# Patient Record
Sex: FEMALE | Race: WHITE | NOT HISPANIC OR LATINO | Employment: OTHER | URBAN - METROPOLITAN AREA
[De-identification: names, ages, dates, MRNs, and addresses within clinical notes are randomized per-mention and may not be internally consistent; named-entity substitution may affect disease eponyms.]

---

## 2017-01-05 ENCOUNTER — RADIANT APPOINTMENT (OUTPATIENT)
Dept: GENERAL RADIOLOGY | Facility: CLINIC | Age: 72
End: 2017-01-05
Attending: FAMILY MEDICINE
Payer: COMMERCIAL

## 2017-01-05 DIAGNOSIS — R52 PAIN: ICD-10-CM

## 2017-01-05 DIAGNOSIS — M54.2 NECK PAIN: ICD-10-CM

## 2017-01-05 DIAGNOSIS — M25.511 RIGHT SHOULDER PAIN, UNSPECIFIED CHRONICITY: ICD-10-CM

## 2017-01-05 PROCEDURE — 72040 X-RAY EXAM NECK SPINE 2-3 VW: CPT

## 2017-01-05 PROCEDURE — 73030 X-RAY EXAM OF SHOULDER: CPT | Mod: RT

## 2017-01-06 ENCOUNTER — HOSPITAL ENCOUNTER (OUTPATIENT)
Dept: PHYSICAL THERAPY | Facility: CLINIC | Age: 72
Setting detail: THERAPIES SERIES
End: 2017-01-06
Attending: FAMILY MEDICINE
Payer: MEDICARE

## 2017-01-06 ENCOUNTER — TELEPHONE (OUTPATIENT)
Dept: FAMILY MEDICINE | Facility: CLINIC | Age: 72
End: 2017-01-06

## 2017-01-06 PROCEDURE — 40000718 ZZHC STATISTIC PT DEPARTMENT ORTHO VISIT: Performed by: PHYSICAL THERAPIST

## 2017-01-06 PROCEDURE — G8985 CARRY GOAL STATUS: HCPCS | Mod: GP,CI | Performed by: PHYSICAL THERAPIST

## 2017-01-06 PROCEDURE — 97140 MANUAL THERAPY 1/> REGIONS: CPT | Mod: GP | Performed by: PHYSICAL THERAPIST

## 2017-01-06 PROCEDURE — G8984 CARRY CURRENT STATUS: HCPCS | Mod: GP,CJ | Performed by: PHYSICAL THERAPIST

## 2017-01-06 PROCEDURE — 97110 THERAPEUTIC EXERCISES: CPT | Mod: GP | Performed by: PHYSICAL THERAPIST

## 2017-01-06 PROCEDURE — G8986 CARRY D/C STATUS: HCPCS | Mod: GP,CJ | Performed by: PHYSICAL THERAPIST

## 2017-01-06 NOTE — PROGRESS NOTES
Outpatient Physical Therapy Progress Note     Patient: Ruba Farmer  : 1945    Beginning/End Dates of Reporting Period:  16 to 2017    Referring Provider: Xiao Dasilva MD    Therapy Diagnosis: Right GH primary impingement     Client Self Report: Recieved Xrays yesterday and the pain has not improved.  Worried that tendons have calcified further.      Objective Measurements:  Objective Measure: Time able to sleep without waking due to neck pain  Details: 4 hours    Objective Measure: Cervical AROM  Details: Rt rot=75 deg; Lt rot=70 deg    Objective Measure: Radiograph results cervical spine  Details: 17 XR cervical spine IMPRESSION: Four views of the cervical spine show no acute finding.  Multilevel degenerative disc disease is present from C4 to C7.    Objective Measure: Radiograph results right shoulder   Details: 17 XR cervical spine IMPRESSION: 3 views of the right shoulder show no acute finding. Calcific tendinitis of the rotator cuff is again suggested and more prominent compared to the prior study.         Goals:  Goal Identifier     Goal Description Following PT interventions, pt will be able to drive for >=2 hours without low back pain to allow for normalized community mobility.   Target Date 16   Date Met  16   Progress:     Goal Identifier     Goal Description Following PT interventions, pt will have 4+/5 left glut med strength to provide proper lumbopelvic stability with weightbearing activities.   Target Date 16   Date Met  16   Progress:     Goal Identifier     Goal Description Following PT interventions, pt will be independent with her HEP to prevent future occurance of pain and difficulty.   Target Date 16   Date Met  16   Progress:     Goal Identifier     Goal Description Following PT interventions, pt will have >=4+/5 right GH ER strength to decrease impingement with overhead activities.   Target Date 16   Date Met   09/02/16   Progress:     Goal Identifier     Goal Description Following PT interventions, pt will be able to sleep on right side for >=7 hours without waking due to pain to allow for restorative sleep pattern.   Target Date 01/20/17   Date Met      Progress:         Progress Toward Goals:   Progress this reporting period: Marcy has made good gains with global shoulder strength and mobility.  Minimal progress with pain relief.  Radiographs reveal calcific tendonitis likely contributing to impingement pain.            Plan:  Changes to therapy plan of care: No formal PT visits at this time.  Chart to remain open for 8 weeks in case of exacerbation.    Discharge:  No      RECERTIFICATION    Ruba Farmer  1945    Session Number: 5/365  since start of care.    Reasons for Continuing Treatment:   Chart to remain open in case of exacerbation; patient is independently working on HEP    Frequency/Duration  1 times per month for 2 months for a total of 2 visits.    Recertification Period  12/31/16 - 3/3/17    Physician Signature:    Date:    X_______________________________________________________    Physician Name: Dr. Xiao Dasilva MD    I certify the need for these services furnished under this plan of treatment and while under my care. Physician co-signature of this document indicates review and certification of the therapy plan.  This signature may be written on paper, or electronically signed within [employer].

## 2017-01-06 NOTE — TELEPHONE ENCOUNTER
Patient will be notified when the xray has been read.  Will close this encounter.    Krista BHAGAT RN

## 2017-01-06 NOTE — TELEPHONE ENCOUNTER
Marcy has been seeing Myrna in PT and she recommended she have an xray. Pt had shoulder xray yesterday and is wanting to talk about the results. She is wondering if this is something that she needs to see ortho for. I did tell her the radiologist has not read this yet.  Please let her know findings when available.

## 2017-01-09 ENCOUNTER — MYC MEDICAL ADVICE (OUTPATIENT)
Dept: FAMILY MEDICINE | Facility: CLINIC | Age: 72
End: 2017-01-09

## 2017-01-09 DIAGNOSIS — M25.511 CHRONIC RIGHT SHOULDER PAIN: Primary | ICD-10-CM

## 2017-01-09 DIAGNOSIS — G89.29 CHRONIC RIGHT SHOULDER PAIN: Primary | ICD-10-CM

## 2017-01-18 ENCOUNTER — TRANSFERRED RECORDS (OUTPATIENT)
Dept: HEALTH INFORMATION MANAGEMENT | Facility: CLINIC | Age: 72
End: 2017-01-18

## 2017-01-30 ENCOUNTER — TRANSFERRED RECORDS (OUTPATIENT)
Dept: HEALTH INFORMATION MANAGEMENT | Facility: CLINIC | Age: 72
End: 2017-01-30

## 2017-02-03 DIAGNOSIS — J45.21 INTERMITTENT ASTHMA, WITH ACUTE EXACERBATION: Primary | ICD-10-CM

## 2017-02-03 RX ORDER — DOXYCYCLINE HYCLATE 100 MG
100 TABLET ORAL 2 TIMES DAILY
Qty: 14 TABLET | Refills: 0 | Status: SHIPPED | OUTPATIENT
Start: 2017-02-03 | End: 2017-02-28

## 2017-02-03 NOTE — TELEPHONE ENCOUNTER
Doxycycline 100mg       Last Written Prescription Date:  1/4/16  Last Fill Quantity: 14,   # refills: 0  Last Office Visit with [facility], [facility] or [facility] prescribing provider: 11/18/16  Future Office visit:       Routing refill request to provider for review/approval because:  Drug not on the [facility], [facility] or [facility] refill protocol or controlled substance    Thank you!  Cassandra Baker   [facility]  P: [phone] F: [phone]

## 2017-02-08 ENCOUNTER — RADIANT APPOINTMENT (OUTPATIENT)
Dept: MAMMOGRAPHY | Facility: CLINIC | Age: 72
End: 2017-02-08
Attending: FAMILY MEDICINE
Payer: COMMERCIAL

## 2017-02-08 DIAGNOSIS — Z12.31 VISIT FOR SCREENING MAMMOGRAM: ICD-10-CM

## 2017-02-08 PROCEDURE — G0202 SCR MAMMO BI INCL CAD: HCPCS | Mod: TC

## 2017-02-09 ENCOUNTER — TRANSFERRED RECORDS (OUTPATIENT)
Dept: HEALTH INFORMATION MANAGEMENT | Facility: CLINIC | Age: 72
End: 2017-02-09

## 2017-02-20 ENCOUNTER — OFFICE VISIT (OUTPATIENT)
Dept: FAMILY MEDICINE | Facility: CLINIC | Age: 72
End: 2017-02-20
Payer: COMMERCIAL

## 2017-02-20 VITALS
DIASTOLIC BLOOD PRESSURE: 80 MMHG | WEIGHT: 191 LBS | SYSTOLIC BLOOD PRESSURE: 126 MMHG | HEART RATE: 72 BPM | HEIGHT: 67 IN | TEMPERATURE: 98.7 F | BODY MASS INDEX: 29.98 KG/M2

## 2017-02-20 DIAGNOSIS — F41.9 ANXIETY: ICD-10-CM

## 2017-02-20 DIAGNOSIS — K21.9 GASTROESOPHAGEAL REFLUX DISEASE WITHOUT ESOPHAGITIS: ICD-10-CM

## 2017-02-20 DIAGNOSIS — J45.30 MILD PERSISTENT ASTHMA WITHOUT COMPLICATION: ICD-10-CM

## 2017-02-20 DIAGNOSIS — Z01.818 PREOP GENERAL PHYSICAL EXAM: Primary | ICD-10-CM

## 2017-02-20 LAB
BASOPHILS # BLD AUTO: 0 10E9/L (ref 0–0.2)
BASOPHILS NFR BLD AUTO: 0.5 %
DIFFERENTIAL METHOD BLD: ABNORMAL
EOSINOPHIL # BLD AUTO: 0.2 10E9/L (ref 0–0.7)
EOSINOPHIL NFR BLD AUTO: 3.9 %
ERYTHROCYTE [DISTWIDTH] IN BLOOD BY AUTOMATED COUNT: 12.5 % (ref 10–15)
HCT VFR BLD AUTO: 34.8 % (ref 35–47)
HGB BLD-MCNC: 11.8 G/DL (ref 11.7–15.7)
LYMPHOCYTES # BLD AUTO: 1.1 10E9/L (ref 0.8–5.3)
LYMPHOCYTES NFR BLD AUTO: 29.5 %
MCH RBC QN AUTO: 31.6 PG (ref 26.5–33)
MCHC RBC AUTO-ENTMCNC: 33.9 G/DL (ref 31.5–36.5)
MCV RBC AUTO: 93 FL (ref 78–100)
MONOCYTES # BLD AUTO: 0.3 10E9/L (ref 0–1.3)
MONOCYTES NFR BLD AUTO: 7.8 %
NEUTROPHILS # BLD AUTO: 2.2 10E9/L (ref 1.6–8.3)
NEUTROPHILS NFR BLD AUTO: 58.3 %
PLATELET # BLD AUTO: 157 10E9/L (ref 150–450)
RBC # BLD AUTO: 3.73 10E12/L (ref 3.8–5.2)
WBC # BLD AUTO: 3.8 10E9/L (ref 4–11)

## 2017-02-20 PROCEDURE — 85025 COMPLETE CBC W/AUTO DIFF WBC: CPT | Performed by: FAMILY MEDICINE

## 2017-02-20 PROCEDURE — 93000 ELECTROCARDIOGRAM COMPLETE: CPT | Performed by: FAMILY MEDICINE

## 2017-02-20 PROCEDURE — 99214 OFFICE O/P EST MOD 30 MIN: CPT | Performed by: FAMILY MEDICINE

## 2017-02-20 PROCEDURE — 36415 COLL VENOUS BLD VENIPUNCTURE: CPT | Performed by: FAMILY MEDICINE

## 2017-02-20 NOTE — PROGRESS NOTES
[facility]  [address] [place] [address]  [phone]  Dept: [phone]    PRE-OP EVALUATION:  Today's date: 2017    Ruba Farmer (: 1945) presents for pre-operative evaluation assessment as requested by Dr. Rene.  He requires evaluation and anesthesia risk assessment prior to undergoing surgery/procedure for treatment of right shoulder pain .  Proposed procedure: arthroscopy right shoulder    Date of Surgery/ Procedure: 3/2/2017  Time of Surgery/ Procedure: to be determined  Hospital/Surgical Facility: [facility]  Primary Physician: Xiao Dasilva  Type of Anesthesia Anticipated: to be determined    Patient has a Health Care Directive or Living Will:  YES     1. NO - Do you have a history of heart attack, stroke, stent, bypass or surgery on an artery in the head, neck, heart or legs?  2. NO - Do you ever have any pain or discomfort in your chest?  3. NO - Do you have a history of  Heart Failure?  4. YES - ARE YOUR TROUBLED BY SHORTNESS OF BREATH WHEN WALKING ON THE LEVEL, UP A SLIGHT HILL OR AT NIGHT? Due to asthma  5. NO - Do you currently have a cold, bronchitis or other respiratory infection?  6. NO - Do you have a cough, shortness of breath or wheezing?  7. NO - Do you sometimes get pains in the calves of your legs when you walk?  8. NO - Do you or anyone in your family have previous history of blood clots?  9. NO - Do you or does anyone in your family have a serious bleeding problem such as prolonged bleeding following surgeries or cuts?  10. YES - HAVE YOU EVER HAD PROBLEMS WITH ANEMIA OR BEEN TOLD TO TAKE IRON PILLS?   11. NO - Have you had any abnormal blood loss such as black, tarry or bloody stools, or abnormal vaginal bleeding?  12. NO - Have you ever had a blood transfusion?  13. NO - Have you or any of your relatives ever had problems with anesthesia?  14. YES - DO YOU HAVE SLEEP APNEA, EXCESSIVE SNORING OR DAYTIME DROWSINESS?  Tired during the day   15. NO - Do you have any prosthetic heart valves?  16. NO - Do you have prosthetic joints?  17. NO - Is there any chance that you may be pregnant?      HPI:                                                      Brief HPI related to upcoming procedure: shoulder pain       ASTHMA - Patient has a longstanding history of moderate-severe Asthma . Patient has been doing well overall noting no sxs at this time and continues on medication regimen consisting of inhalers  without adverse reactions or side effects.                                                                                                                                               .    MEDICAL HISTORY:                                                      Patient Active Problem List    Diagnosis Date Noted     Mild persistent asthma without complication 02/20/2017     Priority: Medium     Nonallergic rhinitis      Priority: Medium     7/22/15 IgE tests all NEGATIVE for environmental allergens.        Diagnostic skin and sensitization tests (aka ALLERGENS)      Priority: Medium     Recurrent UTI 01/02/2014     Priority: Medium     Senile nuclear sclerosis 06/17/2013     Priority: Medium     Polyp of colon, adenomatous 02/11/2013     Priority: Medium     Colonoscopy 2/2013- rescope in 5 years due to tubular adenoma       Advanced directives, counseling/discussion 05/17/2012     Priority: Medium     Patient states has Advance Directive and will bring in a copy to clinic. 5/17/2012          GERD (gastroesophageal reflux disease) 07/08/2011     Priority: Medium     CARDIOVASCULAR SCREENING; LDL GOAL LESS THAN 160 10/31/2010     Priority: Medium     Anxiety 05/08/2009     Priority: Medium     Has some anxiety episodes occasionally, mostly at night.  Uses lorazepam 0.25mg rarely.  20 tabs will last 3-4 months.         Past Medical History   Diagnosis Date     Bronchitis      pt reports yearly bronchitis     Diagnostic skin and  sensitization tests (aka ALLERGENS) 7/22/15 IgE tests all NEGATIVE for environmental allergens.      Nonallergic rhinitis      7/22/15 IgE tests all NEGATIVE for environmental allergens.      Pneumonia      hx of several episodes of pneumonia     Recurrent UTI      Past Surgical History   Procedure Laterality Date     Hysterectomy, vaginal  1998     Colonoscopy  4/6/2007     Phacoemulsification with standard intraocular lens implant  6/17/2013     Procedure: PHACOEMULSIFICATION WITH STANDARD INTRAOCULAR LENS IMPLANT;  Left Kelman phacoemulsification with intraocular lens implant;  Surgeon: Alverto Tijerina MD;  Location: [facility] OR     Current Outpatient Prescriptions   Medication Sig Dispense Refill     LORazepam (ATIVAN) 0.5 MG tablet Take 1 tablet (0.5 mg) by mouth every 8 hours as needed for anxiety 20 tablet 2     fluconazole (DIFLUCAN) 150 MG tablet PRN       Digestive Enzyme CAPS        fluticasone (FLONASE) 50 MCG/ACT nasal spray Spray 1-2 sprays into both nostrils daily 16 g 5     fluticasone (FLOVENT HFA) 220 MCG/ACT inhaler Inhale 2 puffs into the lungs 2 times daily Rinse out mouth after use. 1 Inhaler 5     ipratropium (ATROVENT) 0.06 % nasal spray Spray 2 sprays into both nostrils 3 times daily as needed for rhinitis 3 Box 5     sulfamethoxazole-trimethoprim (BACTRIM DS,SEPTRA DS) 800-160 MG per tablet 1 tab after intercourse 30 tablet 1     pseudoePHEDrine (SUDAFED) 120 MG 12 hr tablet Take 1 tablet (120 mg) by mouth every 12 hours 30 tablet 5     Fexofenadine HCl (ALLEGRA ALLERGY PO) Take by mouth daily       albuterol (PROAIR HFA, PROVENTIL HFA, VENTOLIN HFA) 108 (90 BASE) MCG/ACT inhaler Inhale 2 puffs into the lungs every 6 hours as needed for shortness of breath / dyspnea 1 Inhaler 1     Coenzyme Q10 (COQ10 PO) Take 1 capsule by mouth daily       Probiotic Product (PROBIOTIC DAILY PO) Take 1 tablet by mouth daily       POTASSIUM CITRATE PO Take 1 tablet by mouth daily       Ascorbic Acid (VITAMIN  "C) 500 MG CAPS Take 500 mg by mouth daily        Cholecalciferol (VITAMIN D) 2000 UNITS CAPS Take 2,000 Units by mouth daily        OVER-THE-COUNTER D-monos takes after SIC to prevent UTIs       IBUPROFEN 200 MG OR TABS TAKE 1 TO 3 TABLETS EVERY 6 HOURS AS NEEDED WITH FOOD for 7-14 days 120 0     doxycycline (VIBRA-TABS) 100 MG tablet Take 1 tablet (100 mg) by mouth 2 times daily As needed 14 tablet 0     OTC products: None, except as noted above    Allergies   Allergen Reactions     Macrobid [Nitrofuran Derivatives] Shortness Of Breath and Other (See Comments)     High fever     Codeine Other (See Comments) and Nausea and Vomiting     headache      Latex Allergy: NO    Social History   Substance Use Topics     Smoking status: Never Smoker     Smokeless tobacco: Never Used     Alcohol use Yes      Comment: 2 daily (wine)     History   Drug Use No       REVIEW OF SYSTEMS:                                                    C: NEGATIVE for fever, chills, change in weight  I: NEGATIVE for worrisome rashes, moles or lesions  E: NEGATIVE for vision changes or irritation  E/M: NEGATIVE for ear, mouth and throat problems  R: NEGATIVE for significant cough or SOB  CV: NEGATIVE for chest pain, palpitations or peripheral edema  GI: NEGATIVE for nausea, abdominal pain, heartburn, or change in bowel habits  : NEGATIVE for frequency, dysuria, or hematuria  M: NEGATIVE for significant arthralgias or myalgia  N: NEGATIVE for weakness, dizziness or paresthesias  E: NEGATIVE for temperature intolerance, skin/hair changes  H: NEGATIVE for bleeding problems  P: NEGATIVE for changes in mood or affect    EXAM:                                                    /80 (BP Location: Right arm, Patient Position: Chair, Cuff Size: Adult Large)  Pulse 72  Temp 98.7  F (37.1  C) (Tympanic)  Ht 5' 6.75\" (1.695 m)  Wt 191 lb (86.6 kg)  Breastfeeding? No  BMI 30.14 kg/m2    GENERAL APPEARANCE: healthy, alert and no distress     EYES: EOMI,- " PERRL     HENT: ear canals and TM's normal and nose and mouth without ulcers or lesions     NECK: no adenopathy, no asymmetry, masses, or scars and thyroid normal to palpation     RESP: lungs clear to auscultation - no rales, rhonchi or wheezes     CV: regular rates and rhythm, normal S1 S2, no S3 or S4 and no murmur, click or rub -     ABDOMEN:  soft, nontender, no HSM or masses and bowel sounds normal     MS: extremities normal- no gross deformities noted, no evidence of inflammation in joints, FROM in all extremities.     SKIN: no suspicious lesions or rashes     NEURO: Normal strength and tone, sensory exam grossly normal, mentation intact and speech normal     PSYCH: mentation appears normal. and affect normal/bright     LYMPHATICS: No axillary, cervical, inguinal, or supraclavicular nodes    DIAGNOSTICS:                                                    EKG: appears normal, NSR, normal axis, normal intervals, no acute ST/T changes c/w ischemia, no LVH by voltage criteria, unchanged from previous tracings    Recent Labs   Lab Test  01/04/16   1228  12/15/14   1125   HGB  12.1  12.5   PLT  188  165   NA  137  143   POTASSIUM  4.4  3.7   CR  0.63  0.68        IMPRESSION:                                                    Reason for surgery/procedure: Shoulder Pain     The proposed surgical procedure is considered INTERMEDIATE risk.    REVISED CARDIAC RISK INDEX  The patient has the following serious cardiovascular risks for perioperative complications such as (MI, PE, VFib and 3  AV Block):  No serious cardiac risks  INTERPRETATION: 0 risks: Class I (very low risk - 0.4% complication rate)    The patient has the following additional risks for perioperative complications:  No identified additional risks      ICD-10-CM    1. Preop general physical exam Z01.818 EKG 12-lead complete w/read - Clinics     CBC with platelets differential   2. Anxiety F41.9    3. Gastroesophageal reflux disease without esophagitis K21.9     4. Mild persistent asthma without complication J45.30        RECOMMENDATIONS:                                                          --Patient is to take all scheduled medications on the day of surgery EXCEPT for modifications listed below.    APPROVAL GIVEN to proceed with proposed procedure, without further diagnostic evaluation       Signed Electronically by: Xiao Dasilva MD    Copy of this evaluation report is provided to requesting physician.    [place] Preop Guidelines

## 2017-02-20 NOTE — MR AVS SNAPSHOT
After Visit Summary   2/20/2017    Ruba Farmer    MRN: [id number]           Patient Information     Date Of Birth          1945        Visit Information        Provider Department      2/20/2017 10:00 AM Xiao Dasilva MD [facility]        Today's Diagnoses     Preop general physical exam    -  1    Anxiety        Gastroesophageal reflux disease without esophagitis        Mild persistent asthma without complication          Care Instructions      Before Your Surgery      Call your surgeon if there is any change in your health. This includes signs of a cold or flu (such as a sore throat, runny nose, cough, rash or fever).    Do not smoke, drink alcohol or take over the counter medicine (unless your surgeon or primary care doctor tells you to) for the 24 hours before and after surgery.    If you take prescribed drugs: Follow your doctor s orders about which medicines to take and which to stop until after surgery.    Eating and drinking prior to surgery: follow the instructions from your surgeon    Take a shower or bath the night before surgery. Use the soap your surgeon gave you to gently clean your skin. If you do not have soap from your surgeon, use your regular soap. Do not shave or scrub the surgery site.  Wear clean pajamas and have clean sheets on your bed.         Follow-ups after your visit        Who to contact     If you have questions or need follow up information about today's clinic visit or your schedule please contact [facility] directly at [phone].  Normal or non-critical lab and imaging results will be communicated to you by MyChart, letter or phone within 4 business days after the clinic has received the results. If you do not hear from us within 7 days, please contact the clinic through MyChart or phone. If you have a critical or abnormal lab result, we will notify you by phone as soon as possible.  Submit refill requests  "through [employer] or call your pharmacy and they will forward the refill request to us. Please allow 3 business days for your refill to be completed.          Additional Information About Your Visit        [facility] Information     [employer] gives you secure access to your electronic health record. If you see a primary care provider, you can also send messages to your care team and make appointments. If you have questions, please call your primary care clinic.  If you do not have a primary care provider, please call [phone] and they will assist you.        Care EveryWhere ID     This is your Care EveryWhere ID. This could be used by other organizations to access your [facility] medical records  [id number]        Your Vitals Were     Pulse Temperature Height Breastfeeding? BMI (Body Mass Index)       72 98.7  F (37.1  C) (Tympanic) 5' 6.75\" (1.695 m) No 30.14 kg/m2        Blood Pressure from Last 3 Encounters:   02/20/17 126/80   11/18/16 130/76   10/04/16 132/66    Weight from Last 3 Encounters:   02/20/17 191 lb (86.6 kg)   11/18/16 192 lb 6.4 oz (87.3 kg)   10/04/16 192 lb (87.1 kg)              We Performed the Following     CBC with platelets differential     EKG 12-lead complete w/read - Clinics        Primary Care Provider Office Phone # Fax #    Xiao Dasilva [phone]       [facility] [address] [place] [id number]        Thank you!     Thank you for choosing [facility]  for your care. Our goal is always to provide you with excellent care. Hearing back from our patients is one way we can continue to improve our services. Please take a few minutes to complete the written survey that you may receive in the mail after your visit with us. Thank you!             Your Updated Medication List - Protect others around you: Learn how to safely use, store and throw away your medicines at www.disposemymeds.org.          This list is accurate as of: " 2/20/17 10:44 AM.  Always use your most recent med list.                   Brand Name Dispense Instructions for use    albuterol 108 (90 BASE) MCG/ACT Inhaler    PROAIR HFA/PROVENTIL HFA/VENTOLIN HFA    1 Inhaler    Inhale 2 puffs into the lungs every 6 hours as needed for shortness of breath / dyspnea       ALLEGRA ALLERGY PO      Take by mouth daily       COQ10 PO      Take 1 capsule by mouth daily       Digestive Enzyme Caps          doxycycline 100 MG tablet    VIBRA-TABS    14 tablet    Take 1 tablet (100 mg) by mouth 2 times daily As needed       fluconazole 150 MG tablet    DIFLUCAN     PRN       fluticasone 220 MCG/ACT Inhaler    FLOVENT HFA    1 Inhaler    Inhale 2 puffs into the lungs 2 times daily Rinse out mouth after use.       fluticasone 50 MCG/ACT spray    FLONASE    16 g    Spray 1-2 sprays into both nostrils daily       ibuprofen 200 MG tablet    ADVIL/MOTRIN    120    TAKE 1 TO 3 TABLETS EVERY 6 HOURS AS NEEDED WITH FOOD for 7-14 days       ipratropium 0.06 % spray    ATROVENT    3 Box    Spray 2 sprays into both nostrils 3 times daily as needed for rhinitis       LORazepam 0.5 MG tablet    ATIVAN    20 tablet    Take 1 tablet (0.5 mg) by mouth every 8 hours as needed for anxiety       OVER-THE-COUNTER      D-monos takes after SIC to prevent UTIs       POTASSIUM CITRATE PO      Take 1 tablet by mouth daily       PROBIOTIC DAILY PO      Take 1 tablet by mouth daily       pseudoePHEDrine 120 MG 12 hr tablet    SUDAFED    30 tablet    Take 1 tablet (120 mg) by mouth every 12 hours       sulfamethoxazole-trimethoprim 800-160 MG per tablet    BACTRIM DS/SEPTRA DS    30 tablet    1 tab after intercourse       Vitamin C 500 MG Caps      Take 500 mg by mouth daily       vitamin D 2000 UNITS Caps      Take 2,000 Units by mouth daily

## 2017-02-20 NOTE — NURSING NOTE
"Chief Complaint   Patient presents with     Pre-Op Exam       Initial /80 (BP Location: Right arm, Patient Position: Chair, Cuff Size: Adult Large)  Pulse 72  Temp 98.7  F (37.1  C) (Tympanic)  Ht 5' 6.75\" (1.695 m)  Wt 191 lb (86.6 kg)  Breastfeeding? No  BMI 30.14 kg/m2 Estimated body mass index is 30.14 kg/(m^2) as calculated from the following:    Height as of this encounter: 5' 6.75\" (1.695 m).    Weight as of this encounter: 191 lb (86.6 kg).  Medication Reconciliation: complete        "

## 2017-02-21 ASSESSMENT — ASTHMA QUESTIONNAIRES: ACT_TOTALSCORE: 23

## 2017-03-01 ENCOUNTER — ANESTHESIA EVENT (OUTPATIENT)
Dept: SURGERY | Facility: CLINIC | Age: 72
End: 2017-03-01
Payer: MEDICARE

## 2017-03-02 ENCOUNTER — HOSPITAL ENCOUNTER (OUTPATIENT)
Facility: CLINIC | Age: 72
Discharge: HOME | End: 2017-03-02
Attending: ORTHOPAEDIC SURGERY | Admitting: ORTHOPAEDIC SURGERY
Payer: MEDICARE

## 2017-03-02 ENCOUNTER — ANESTHESIA (OUTPATIENT)
Dept: SURGERY | Facility: CLINIC | Age: 72
End: 2017-03-02
Payer: MEDICARE

## 2017-03-02 VITALS
DIASTOLIC BLOOD PRESSURE: 66 MMHG | OXYGEN SATURATION: 97 % | TEMPERATURE: 97.6 F | RESPIRATION RATE: 16 BRPM | SYSTOLIC BLOOD PRESSURE: 125 MMHG | HEART RATE: 60 BPM

## 2017-03-02 DIAGNOSIS — M75.111 INCOMPLETE TEAR OF RIGHT ROTATOR CUFF: Primary | ICD-10-CM

## 2017-03-02 PROCEDURE — 25000128 H RX IP 250 OP 636: Performed by: ORTHOPAEDIC SURGERY

## 2017-03-02 PROCEDURE — 27210794 ZZH OR GENERAL SUPPLY STERILE: Performed by: ORTHOPAEDIC SURGERY

## 2017-03-02 PROCEDURE — 71000027 ZZH RECOVERY PHASE 2 EACH 15 MINS: Performed by: ORTHOPAEDIC SURGERY

## 2017-03-02 PROCEDURE — 36000093 ZZH SURGERY LEVEL 4 1ST 30 MIN: Performed by: ORTHOPAEDIC SURGERY

## 2017-03-02 PROCEDURE — 37000011 ZZH ANESTHESIA WARD SERVICE: Performed by: NURSE ANESTHETIST, CERTIFIED REGISTERED

## 2017-03-02 PROCEDURE — 37000009 ZZH ANESTHESIA TECHNICAL FEE, EACH ADDTL 15 MIN: Performed by: ORTHOPAEDIC SURGERY

## 2017-03-02 PROCEDURE — 27110028 ZZH OR GENERAL SUPPLY NON-STERILE: Performed by: ORTHOPAEDIC SURGERY

## 2017-03-02 PROCEDURE — 25000128 H RX IP 250 OP 636: Performed by: PHYSICIAN ASSISTANT

## 2017-03-02 PROCEDURE — 25000564 ZZH DESFLURANE, EA 15 MIN: Performed by: ORTHOPAEDIC SURGERY

## 2017-03-02 PROCEDURE — 25000125 ZZHC RX 250: Performed by: NURSE ANESTHETIST, CERTIFIED REGISTERED

## 2017-03-02 PROCEDURE — 36000063 ZZH SURGERY LEVEL 4 EA 15 ADDTL MIN: Performed by: ORTHOPAEDIC SURGERY

## 2017-03-02 PROCEDURE — 25800025 ZZH RX 258: Performed by: NURSE ANESTHETIST, CERTIFIED REGISTERED

## 2017-03-02 PROCEDURE — 25000128 H RX IP 250 OP 636: Performed by: NURSE ANESTHETIST, CERTIFIED REGISTERED

## 2017-03-02 PROCEDURE — 71000012 ZZH RECOVERY PHASE 1 LEVEL 1 FIRST HR: Performed by: ORTHOPAEDIC SURGERY

## 2017-03-02 PROCEDURE — 27210995 ZZH RX 272: Performed by: ORTHOPAEDIC SURGERY

## 2017-03-02 PROCEDURE — 40000306 ZZH STATISTIC PRE PROC ASSESS II: Performed by: ORTHOPAEDIC SURGERY

## 2017-03-02 PROCEDURE — C1713 ANCHOR/SCREW BN/BN,TIS/BN: HCPCS | Performed by: ORTHOPAEDIC SURGERY

## 2017-03-02 PROCEDURE — 37000008 ZZH ANESTHESIA TECHNICAL FEE, 1ST 30 MIN: Performed by: ORTHOPAEDIC SURGERY

## 2017-03-02 DEVICE — IMPLANTABLE DEVICE: Type: IMPLANTABLE DEVICE | Status: FUNCTIONAL

## 2017-03-02 RX ORDER — HYDROMORPHONE HYDROCHLORIDE 1 MG/ML
.3-.5 INJECTION, SOLUTION INTRAMUSCULAR; INTRAVENOUS; SUBCUTANEOUS EVERY 10 MIN PRN
Status: DISCONTINUED | OUTPATIENT
Start: 2017-03-02 | End: 2017-03-02 | Stop reason: HOSPADM

## 2017-03-02 RX ORDER — KETOROLAC TROMETHAMINE 30 MG/ML
INJECTION, SOLUTION INTRAMUSCULAR; INTRAVENOUS PRN
Status: DISCONTINUED | OUTPATIENT
Start: 2017-03-02 | End: 2017-03-02

## 2017-03-02 RX ORDER — DEXAMETHASONE SODIUM PHOSPHATE 4 MG/ML
INJECTION, SOLUTION INTRA-ARTICULAR; INTRALESIONAL; INTRAMUSCULAR; INTRAVENOUS; SOFT TISSUE PRN
Status: DISCONTINUED | OUTPATIENT
Start: 2017-03-02 | End: 2017-03-02

## 2017-03-02 RX ORDER — ONDANSETRON 2 MG/ML
4 INJECTION INTRAMUSCULAR; INTRAVENOUS EVERY 30 MIN PRN
Status: DISCONTINUED | OUTPATIENT
Start: 2017-03-02 | End: 2017-03-02 | Stop reason: HOSPADM

## 2017-03-02 RX ORDER — FENTANYL CITRATE 50 UG/ML
INJECTION, SOLUTION INTRAMUSCULAR; INTRAVENOUS PRN
Status: DISCONTINUED | OUTPATIENT
Start: 2017-03-02 | End: 2017-03-02

## 2017-03-02 RX ORDER — ROPIVACAINE HYDROCHLORIDE 5 MG/ML
INJECTION, SOLUTION EPIDURAL; INFILTRATION; PERINEURAL PRN
Status: DISCONTINUED | OUTPATIENT
Start: 2017-03-02 | End: 2017-03-02

## 2017-03-02 RX ORDER — PROPOFOL 10 MG/ML
INJECTION, EMULSION INTRAVENOUS PRN
Status: DISCONTINUED | OUTPATIENT
Start: 2017-03-02 | End: 2017-03-02

## 2017-03-02 RX ORDER — FENTANYL CITRATE 50 UG/ML
25-50 INJECTION, SOLUTION INTRAMUSCULAR; INTRAVENOUS
Status: DISCONTINUED | OUTPATIENT
Start: 2017-03-02 | End: 2017-03-02 | Stop reason: HOSPADM

## 2017-03-02 RX ORDER — MEPERIDINE HYDROCHLORIDE 25 MG/ML
12.5 INJECTION INTRAMUSCULAR; INTRAVENOUS; SUBCUTANEOUS
Status: DISCONTINUED | OUTPATIENT
Start: 2017-03-02 | End: 2017-03-02 | Stop reason: HOSPADM

## 2017-03-02 RX ORDER — ONDANSETRON 4 MG/1
4 TABLET, ORALLY DISINTEGRATING ORAL EVERY 30 MIN PRN
Status: DISCONTINUED | OUTPATIENT
Start: 2017-03-02 | End: 2017-03-02 | Stop reason: HOSPADM

## 2017-03-02 RX ORDER — SODIUM CHLORIDE, SODIUM LACTATE, POTASSIUM CHLORIDE, CALCIUM CHLORIDE 600; 310; 30; 20 MG/100ML; MG/100ML; MG/100ML; MG/100ML
INJECTION, SOLUTION INTRAVENOUS CONTINUOUS
Status: DISCONTINUED | OUTPATIENT
Start: 2017-03-02 | End: 2017-03-02 | Stop reason: HOSPADM

## 2017-03-02 RX ORDER — EPHEDRINE SULFATE 50 MG/ML
INJECTION, SOLUTION INTRAVENOUS PRN
Status: DISCONTINUED | OUTPATIENT
Start: 2017-03-02 | End: 2017-03-02

## 2017-03-02 RX ORDER — CEFAZOLIN SODIUM 1 G/3ML
1 INJECTION, POWDER, FOR SOLUTION INTRAMUSCULAR; INTRAVENOUS SEE ADMIN INSTRUCTIONS
Status: DISCONTINUED | OUTPATIENT
Start: 2017-03-02 | End: 2017-03-02 | Stop reason: HOSPADM

## 2017-03-02 RX ORDER — LIDOCAINE HYDROCHLORIDE 10 MG/ML
INJECTION, SOLUTION INFILTRATION; PERINEURAL PRN
Status: DISCONTINUED | OUTPATIENT
Start: 2017-03-02 | End: 2017-03-02

## 2017-03-02 RX ORDER — ALBUTEROL SULFATE 0.83 MG/ML
2.5 SOLUTION RESPIRATORY (INHALATION) ONCE
Status: DISCONTINUED | OUTPATIENT
Start: 2017-03-02 | End: 2017-03-02 | Stop reason: HOSPADM

## 2017-03-02 RX ORDER — GLYCOPYRROLATE 0.2 MG/ML
INJECTION, SOLUTION INTRAMUSCULAR; INTRAVENOUS PRN
Status: DISCONTINUED | OUTPATIENT
Start: 2017-03-02 | End: 2017-03-02

## 2017-03-02 RX ORDER — HYDROMORPHONE HYDROCHLORIDE 2 MG/1
2 TABLET ORAL EVERY 4 HOURS PRN
Qty: 40 TABLET | Refills: 0 | Status: SHIPPED | OUTPATIENT
Start: 2017-03-02 | End: 2017-04-19

## 2017-03-02 RX ORDER — HYDROXYZINE HYDROCHLORIDE 25 MG/1
25 TABLET, FILM COATED ORAL EVERY 6 HOURS PRN
Qty: 40 TABLET | Refills: 0 | Status: SHIPPED | OUTPATIENT
Start: 2017-03-02 | End: 2017-04-19

## 2017-03-02 RX ORDER — CEFAZOLIN SODIUM 2 G/100ML
2 INJECTION, SOLUTION INTRAVENOUS
Status: COMPLETED | OUTPATIENT
Start: 2017-03-02 | End: 2017-03-02

## 2017-03-02 RX ORDER — LIDOCAINE HYDROCHLORIDE 10 MG/ML
INJECTION, SOLUTION EPIDURAL; INFILTRATION; INTRACAUDAL; PERINEURAL PRN
Status: DISCONTINUED | OUTPATIENT
Start: 2017-03-02 | End: 2017-03-02

## 2017-03-02 RX ORDER — NALOXONE HYDROCHLORIDE 0.4 MG/ML
.1-.4 INJECTION, SOLUTION INTRAMUSCULAR; INTRAVENOUS; SUBCUTANEOUS
Status: DISCONTINUED | OUTPATIENT
Start: 2017-03-02 | End: 2017-03-02 | Stop reason: HOSPADM

## 2017-03-02 RX ORDER — LIDOCAINE HCL/EPINEPHRINE/PF 2%-1:200K
VIAL (ML) INJECTION PRN
Status: DISCONTINUED | OUTPATIENT
Start: 2017-03-02 | End: 2017-03-02

## 2017-03-02 RX ORDER — LIDOCAINE 40 MG/G
CREAM TOPICAL
Status: DISCONTINUED | OUTPATIENT
Start: 2017-03-02 | End: 2017-03-02 | Stop reason: HOSPADM

## 2017-03-02 RX ADMIN — PHENYLEPHRINE HYDROCHLORIDE 100 MCG: 10 INJECTION, SOLUTION INTRAMUSCULAR; INTRAVENOUS; SUBCUTANEOUS at 11:45

## 2017-03-02 RX ADMIN — PROPOFOL 150 MG: 10 INJECTION, EMULSION INTRAVENOUS at 10:43

## 2017-03-02 RX ADMIN — LIDOCAINE HYDROCHLORIDE 50 MG: 10 INJECTION, SOLUTION INFILTRATION; PERINEURAL at 10:43

## 2017-03-02 RX ADMIN — PHENYLEPHRINE HYDROCHLORIDE 100 MCG: 10 INJECTION, SOLUTION INTRAMUSCULAR; INTRAVENOUS; SUBCUTANEOUS at 11:00

## 2017-03-02 RX ADMIN — FENTANYL CITRATE 100 MCG: 50 INJECTION, SOLUTION INTRAMUSCULAR; INTRAVENOUS at 08:41

## 2017-03-02 RX ADMIN — KETOROLAC TROMETHAMINE 30 MG: 30 INJECTION, SOLUTION INTRAMUSCULAR; INTRAVENOUS at 12:42

## 2017-03-02 RX ADMIN — SODIUM CHLORIDE, POTASSIUM CHLORIDE, SODIUM LACTATE AND CALCIUM CHLORIDE: 600; 310; 30; 20 INJECTION, SOLUTION INTRAVENOUS at 07:31

## 2017-03-02 RX ADMIN — EPHEDRINE SULFATE 20 MG: 50 INJECTION, SOLUTION INTRAMUSCULAR; INTRAVENOUS; SUBCUTANEOUS at 11:02

## 2017-03-02 RX ADMIN — LIDOCAINE HYDROCHLORIDE,EPINEPHRINE BITARTRATE 3 ML: 20; .005 INJECTION, SOLUTION EPIDURAL; INFILTRATION; INTRACAUDAL; PERINEURAL at 08:47

## 2017-03-02 RX ADMIN — FENTANYL CITRATE 50 MCG: 50 INJECTION, SOLUTION INTRAMUSCULAR; INTRAVENOUS at 08:51

## 2017-03-02 RX ADMIN — ROCURONIUM BROMIDE 30 MG: 10 INJECTION INTRAVENOUS at 10:43

## 2017-03-02 RX ADMIN — MIDAZOLAM HYDROCHLORIDE 1 MG: 1 INJECTION, SOLUTION INTRAMUSCULAR; INTRAVENOUS at 08:47

## 2017-03-02 RX ADMIN — ROPIVACAINE HYDROCHLORIDE 40 ML: 5 INJECTION, SOLUTION EPIDURAL; INFILTRATION; PERINEURAL at 08:49

## 2017-03-02 RX ADMIN — PHENYLEPHRINE HYDROCHLORIDE 100 MCG: 10 INJECTION, SOLUTION INTRAMUSCULAR; INTRAVENOUS; SUBCUTANEOUS at 10:55

## 2017-03-02 RX ADMIN — MIDAZOLAM HYDROCHLORIDE 2 MG: 1 INJECTION, SOLUTION INTRAMUSCULAR; INTRAVENOUS at 10:41

## 2017-03-02 RX ADMIN — EPHEDRINE SULFATE 10 MG: 50 INJECTION, SOLUTION INTRAMUSCULAR; INTRAVENOUS; SUBCUTANEOUS at 11:45

## 2017-03-02 RX ADMIN — FENTANYL CITRATE 100 MCG: 50 INJECTION, SOLUTION INTRAMUSCULAR; INTRAVENOUS at 10:43

## 2017-03-02 RX ADMIN — DEXAMETHASONE SODIUM PHOSPHATE 8 MG: 4 INJECTION, SOLUTION INTRAMUSCULAR; INTRAVENOUS at 10:43

## 2017-03-02 RX ADMIN — LIDOCAINE HYDROCHLORIDE 1 ML: 10 INJECTION, SOLUTION INFILTRATION; PERINEURAL at 07:31

## 2017-03-02 RX ADMIN — GLYCOPYRROLATE 0.2 MG: 0.2 INJECTION, SOLUTION INTRAMUSCULAR; INTRAVENOUS at 10:43

## 2017-03-02 RX ADMIN — LIDOCAINE HYDROCHLORIDE 1 ML: 10 INJECTION, SOLUTION EPIDURAL; INFILTRATION; INTRACAUDAL; PERINEURAL at 08:44

## 2017-03-02 RX ADMIN — PHENYLEPHRINE HYDROCHLORIDE 100 MCG: 10 INJECTION, SOLUTION INTRAMUSCULAR; INTRAVENOUS; SUBCUTANEOUS at 11:30

## 2017-03-02 RX ADMIN — CEFAZOLIN SODIUM 2 G: 2 INJECTION, SOLUTION INTRAVENOUS at 10:43

## 2017-03-02 RX ADMIN — MIDAZOLAM HYDROCHLORIDE 2 MG: 1 INJECTION, SOLUTION INTRAMUSCULAR; INTRAVENOUS at 08:42

## 2017-03-02 NOTE — ANESTHESIA PROCEDURE NOTES
Peripheral nerve/Neuraxial procedure note : interscalene  Pre-Procedure  Performed by  TUNDE ALBARRAN   Location: pre-op    Procedure Times:3/2/2017 8:40 AM and 3/2/2017 8:53 AM  Pre-Anesthestic Checklist: patient identified, IV checked, site marked, risks and benefits discussed, informed consent, monitors and equipment checked, pre-op evaluation, at physician/surgeon's request and post-op pain management    Timeout  Correct Patient: Yes   Correct Procedure: Yes   Correct Site: Yes   Correct Laterality: Yes   Correct Position: Yes   Site Marked: Yes   .   Procedure Documentation    Diagnosis:PAIN.    Procedure:    Interscalene.  Local skin infiltrated with 1 mL of 1% lidocaine.     Ultrasound used to identify targeted nerve, plexus, or vascular marker and placed a needle adjacent to it. A permanent image is entered into the patient's record.  Patient Prep;mask, sterile gloves, chlorhexidine gluconate and isopropyl alcohol, patient draped.  Nerve Stim: Initial Level 1 mA.  Lowest motor response 0.42 mA..  Needle: insulated, short bevel (22 G. 2 in). .  Spinal Needle: . . Insertion Method: Single Shot.     Assessment/Narrative  Paresthesias: No.  Injection made incrementally with aspirations every 5 mL..  The placement was negative for: blood aspirated, painful injection and site bleeding.  Bolus given via needle. No blood aspirated via catheter.   Secured via.   Complications: none. Test dose of 3 mL at. Test dose negative for signs of intravascular, subdural or intrathecal injection.

## 2017-03-02 NOTE — ANESTHESIA POSTPROCEDURE EVALUATION
Patient: Ruba VIEIRA Darian    Procedure(s):  Right shoulder arthroscopy subacromial decompression and debridement with mini open rotator cuff repair.  - Wound Class: I-Clean    Diagnosis:Calcific tendonitis right shoulder  Diagnosis Additional Information: No value filed.    Anesthesia Type:  General, Periph. Nerve Block for postop pain, ETT    Note:  Anesthesia Post Evaluation    Patient participation: Able to fully participate in evaluation  Level of consciousness: awake  Pain management: adequate  Airway patency: patent  Cardiovascular status: acceptable  Respiratory status: acceptable  Hydration status: stable  PONV: none     Anesthetic complications: None          Last vitals:  Vitals:    03/02/17 1430 03/02/17 1500 03/02/17 1515   BP: 129/65 91/61 125/66   Pulse:      Resp: 16 16 16   Temp:      SpO2:   97%         Electronically Signed By: SANJUANITA Cooney CRNA  March 2, 2017  4:44 PM

## 2017-03-02 NOTE — BRIEF OP NOTE
[facility] Orthopedic Brief Operative Note    Pre-operative diagnosis: Calcific tendonitis right shoulder, impingement   Post-operative diagnosis: Same with partial thickness Rotator cuff tear   Procedure: Procedure(s):  ARTHROSCOPY SHOULDER DECOMPRESSION with rotator cuff debridment and cuff repair   Surgeon: Dallas Rene MD   Assistant(s): None and Corbin Velez PA-C   Anesthesia: General endotracheal anesthesia   Estimated blood loss: Less than 50 ml   Total IV fluids: (See anesthesia record)   Total urine output: Not measured   Drains: None   Specimens: None   Implants: (See full op note)       Complications: None   Condition: Stable   Comments: See dictated operative report for full details

## 2017-03-02 NOTE — H&P (VIEW-ONLY)
[facility]  [address] [place] [id number]  [phone]  Dept: [phone]    PRE-OP EVALUATION:  Today's date: 2017    Ruba Farmer (: 1945) presents for pre-operative evaluation assessment as requested by Dr. Rene.  He requires evaluation and anesthesia risk assessment prior to undergoing surgery/procedure for treatment of right shoulder pain .  Proposed procedure: arthroscopy right shoulder    Date of Surgery/ Procedure: 3/2/2017  Time of Surgery/ Procedure: to be determined  Hospital/Surgical Facility: [facility]  Primary Physician: Xiao Dasilva  Type of Anesthesia Anticipated: to be determined    Patient has a Health Care Directive or Living Will:  YES     1. NO - Do you have a history of heart attack, stroke, stent, bypass or surgery on an artery in the head, neck, heart or legs?  2. NO - Do you ever have any pain or discomfort in your chest?  3. NO - Do you have a history of  Heart Failure?  4. YES - ARE YOUR TROUBLED BY SHORTNESS OF BREATH WHEN WALKING ON THE LEVEL, UP A SLIGHT HILL OR AT NIGHT? Due to asthma  5. NO - Do you currently have a cold, bronchitis or other respiratory infection?  6. NO - Do you have a cough, shortness of breath or wheezing?  7. NO - Do you sometimes get pains in the calves of your legs when you walk?  8. NO - Do you or anyone in your family have previous history of blood clots?  9. NO - Do you or does anyone in your family have a serious bleeding problem such as prolonged bleeding following surgeries or cuts?  10. YES - HAVE YOU EVER HAD PROBLEMS WITH ANEMIA OR BEEN TOLD TO TAKE IRON PILLS?   11. NO - Have you had any abnormal blood loss such as black, tarry or bloody stools, or abnormal vaginal bleeding?  12. NO - Have you ever had a blood transfusion?  13. NO - Have you or any of your relatives ever had problems with anesthesia?  14. YES - DO YOU HAVE SLEEP APNEA, EXCESSIVE SNORING OR DAYTIME DROWSINESS?  Tired during the day   15. NO - Do you have any prosthetic heart valves?  16. NO - Do you have prosthetic joints?  17. NO - Is there any chance that you may be pregnant?      HPI:                                                      Brief HPI related to upcoming procedure: shoulder pain       ASTHMA - Patient has a longstanding history of moderate-severe Asthma . Patient has been doing well overall noting no sxs at this time and continues on medication regimen consisting of inhalers  without adverse reactions or side effects.                                                                                                                                               .    MEDICAL HISTORY:                                                      Patient Active Problem List    Diagnosis Date Noted     Mild persistent asthma without complication 02/20/2017     Priority: Medium     Nonallergic rhinitis      Priority: Medium     7/22/15 IgE tests all NEGATIVE for environmental allergens.        Diagnostic skin and sensitization tests (aka ALLERGENS)      Priority: Medium     Recurrent UTI 01/02/2014     Priority: Medium     Senile nuclear sclerosis 06/17/2013     Priority: Medium     Polyp of colon, adenomatous 02/11/2013     Priority: Medium     Colonoscopy 2/2013- rescope in 5 years due to tubular adenoma       Advanced directives, counseling/discussion 05/17/2012     Priority: Medium     Patient states has Advance Directive and will bring in a copy to clinic. 5/17/2012          GERD (gastroesophageal reflux disease) 07/08/2011     Priority: Medium     CARDIOVASCULAR SCREENING; LDL GOAL LESS THAN 160 10/31/2010     Priority: Medium     Anxiety 05/08/2009     Priority: Medium     Has some anxiety episodes occasionally, mostly at night.  Uses lorazepam 0.25mg rarely.  20 tabs will last 3-4 months.         Past Medical History   Diagnosis Date     Bronchitis      pt reports yearly bronchitis     Diagnostic skin and  sensitization tests (aka ALLERGENS) 7/22/15 IgE tests all NEGATIVE for environmental allergens.      Nonallergic rhinitis      7/22/15 IgE tests all NEGATIVE for environmental allergens.      Pneumonia      hx of several episodes of pneumonia     Recurrent UTI      Past Surgical History   Procedure Laterality Date     Hysterectomy, vaginal  1998     Colonoscopy  4/6/2007     Phacoemulsification with standard intraocular lens implant  6/17/2013     Procedure: PHACOEMULSIFICATION WITH STANDARD INTRAOCULAR LENS IMPLANT;  Left Kelman phacoemulsification with intraocular lens implant;  Surgeon: Alverto Tijerina MD;  Location: [facility] OR     Current Outpatient Prescriptions   Medication Sig Dispense Refill     LORazepam (ATIVAN) 0.5 MG tablet Take 1 tablet (0.5 mg) by mouth every 8 hours as needed for anxiety 20 tablet 2     fluconazole (DIFLUCAN) 150 MG tablet PRN       Digestive Enzyme CAPS        fluticasone (FLONASE) 50 MCG/ACT nasal spray Spray 1-2 sprays into both nostrils daily 16 g 5     fluticasone (FLOVENT HFA) 220 MCG/ACT inhaler Inhale 2 puffs into the lungs 2 times daily Rinse out mouth after use. 1 Inhaler 5     ipratropium (ATROVENT) 0.06 % nasal spray Spray 2 sprays into both nostrils 3 times daily as needed for rhinitis 3 Box 5     sulfamethoxazole-trimethoprim (BACTRIM DS,SEPTRA DS) 800-160 MG per tablet 1 tab after intercourse 30 tablet 1     pseudoePHEDrine (SUDAFED) 120 MG 12 hr tablet Take 1 tablet (120 mg) by mouth every 12 hours 30 tablet 5     Fexofenadine HCl (ALLEGRA ALLERGY PO) Take by mouth daily       albuterol (PROAIR HFA, PROVENTIL HFA, VENTOLIN HFA) 108 (90 BASE) MCG/ACT inhaler Inhale 2 puffs into the lungs every 6 hours as needed for shortness of breath / dyspnea 1 Inhaler 1     Coenzyme Q10 (COQ10 PO) Take 1 capsule by mouth daily       Probiotic Product (PROBIOTIC DAILY PO) Take 1 tablet by mouth daily       POTASSIUM CITRATE PO Take 1 tablet by mouth daily       Ascorbic Acid (VITAMIN  "C) 500 MG CAPS Take 500 mg by mouth daily        Cholecalciferol (VITAMIN D) 2000 UNITS CAPS Take 2,000 Units by mouth daily        OVER-THE-COUNTER D-monos takes after SIC to prevent UTIs       IBUPROFEN 200 MG OR TABS TAKE 1 TO 3 TABLETS EVERY 6 HOURS AS NEEDED WITH FOOD for 7-14 days 120 0     doxycycline (VIBRA-TABS) 100 MG tablet Take 1 tablet (100 mg) by mouth 2 times daily As needed 14 tablet 0     OTC products: None, except as noted above    Allergies   Allergen Reactions     Macrobid [Nitrofuran Derivatives] Shortness Of Breath and Other (See Comments)     High fever     Codeine Other (See Comments) and Nausea and Vomiting     headache      Latex Allergy: NO    Social History   Substance Use Topics     Smoking status: Never Smoker     Smokeless tobacco: Never Used     Alcohol use Yes      Comment: 2 daily (wine)     History   Drug Use No       REVIEW OF SYSTEMS:                                                    C: NEGATIVE for fever, chills, change in weight  I: NEGATIVE for worrisome rashes, moles or lesions  E: NEGATIVE for vision changes or irritation  E/M: NEGATIVE for ear, mouth and throat problems  R: NEGATIVE for significant cough or SOB  CV: NEGATIVE for chest pain, palpitations or peripheral edema  GI: NEGATIVE for nausea, abdominal pain, heartburn, or change in bowel habits  : NEGATIVE for frequency, dysuria, or hematuria  M: NEGATIVE for significant arthralgias or myalgia  N: NEGATIVE for weakness, dizziness or paresthesias  E: NEGATIVE for temperature intolerance, skin/hair changes  H: NEGATIVE for bleeding problems  P: NEGATIVE for changes in mood or affect    EXAM:                                                    /80 (BP Location: Right arm, Patient Position: Chair, Cuff Size: Adult Large)  Pulse 72  Temp 98.7  F (37.1  C) (Tympanic)  Ht 5' 6.75\" (1.695 m)  Wt 191 lb (86.6 kg)  Breastfeeding? No  BMI 30.14 kg/m2    GENERAL APPEARANCE: healthy, alert and no distress     EYES: EOMI,- " PERRL     HENT: ear canals and TM's normal and nose and mouth without ulcers or lesions     NECK: no adenopathy, no asymmetry, masses, or scars and thyroid normal to palpation     RESP: lungs clear to auscultation - no rales, rhonchi or wheezes     CV: regular rates and rhythm, normal S1 S2, no S3 or S4 and no murmur, click or rub -     ABDOMEN:  soft, nontender, no HSM or masses and bowel sounds normal     MS: extremities normal- no gross deformities noted, no evidence of inflammation in joints, FROM in all extremities.     SKIN: no suspicious lesions or rashes     NEURO: Normal strength and tone, sensory exam grossly normal, mentation intact and speech normal     PSYCH: mentation appears normal. and affect normal/bright     LYMPHATICS: No axillary, cervical, inguinal, or supraclavicular nodes    DIAGNOSTICS:                                                    EKG: appears normal, NSR, normal axis, normal intervals, no acute ST/T changes c/w ischemia, no LVH by voltage criteria, unchanged from previous tracings    Recent Labs   Lab Test  01/04/16   1228  12/15/14   1125   HGB  12.1  12.5   PLT  188  165   NA  137  143   POTASSIUM  4.4  3.7   CR  0.63  0.68        IMPRESSION:                                                    Reason for surgery/procedure: Shoulder Pain     The proposed surgical procedure is considered INTERMEDIATE risk.    REVISED CARDIAC RISK INDEX  The patient has the following serious cardiovascular risks for perioperative complications such as (MI, PE, VFib and 3  AV Block):  No serious cardiac risks  INTERPRETATION: 0 risks: Class I (very low risk - 0.4% complication rate)    The patient has the following additional risks for perioperative complications:  No identified additional risks      ICD-10-CM    1. Preop general physical exam Z01.818 EKG 12-lead complete w/read - Clinics     CBC with platelets differential   2. Anxiety F41.9    3. Gastroesophageal reflux disease without esophagitis K21.9     4. Mild persistent asthma without complication J45.30        RECOMMENDATIONS:                                                          --Patient is to take all scheduled medications on the day of surgery EXCEPT for modifications listed below.    APPROVAL GIVEN to proceed with proposed procedure, without further diagnostic evaluation       Signed Electronically by: Xiao Dasilva MD    Copy of this evaluation report is provided to requesting physician.    [facility] Preop Guidelines

## 2017-03-02 NOTE — IP AVS SNAPSHOT
[facility] PreOP/Phase II    [address] [facility] [address] [address]    Phone:  [phone]    Fax:  [phone]                                       After Visit Summary   3/2/2017    Ruba Farmer    MRN: [id number]           After Visit Summary Signature Page     I have received my discharge instructions, and my questions have been answered. I have discussed any challenges I see with this plan with the nurse or doctor.    ..........................................................................................................................................  Patient/Patient Representative Signature      ..........................................................................................................................................  Patient Representative Print Name and Relationship to Patient    ..................................................               ................................................  Date                                            Time    ..........................................................................................................................................  Reviewed by Signature/Title    ...................................................              ..............................................  Date                                                            Time

## 2017-03-02 NOTE — DISCHARGE INSTRUCTIONS
Same Day Surgery Discharge Instructions  Special Precautions After Surgery - Adult    1. It is not unusual to feel lightheaded or faint, up to 24 hours after surgery or while taking pain medication.  If you have these symptoms; sit for a few minutes before standing and have someone assist you when getting up.  2. You should rest and relax for the next 24 hours and must have someone stay with you for at least 24 hours after your discharge.  3. DO NOT DRIVE any vehicle or operate mechanical equipment for 24 hours following the end of your surgery.  DO NOT DRIVE while taking narcotic pain medications that have been prescribed by your physician.  If you had a limb operated on, you must be able to use it fully to drive.  4. DO NOT drink alcoholic beverages for 24 hours following surgery or while taking prescription pain medication.  5. Drink clear liquids (apple juice, ginger ale, broth, 7-Up, etc.).  Progress to your regular diet as you feel able.  6. Any questions call your physician and do not make important decisions for 24 hours.      __________________________________________________________________________________________________________________________________  IMPORTANT NUMBERS:    [facility] Main Number:  [phone], [phone]  Pharmacy:  [phone]  Same Day Surgery:  [phone], Monday - Friday until 8:30 p.m.  Urgent Care:  [phone]  Emergency Room:  [phone]      [facility]:  [phone]                                                                             [facility]:  [phone] option 1  [facility]:  [phone]     [facility]:  [phone]   [facility]:  [phone]   Home Medical Equipment: [phone]  [facility]:  [phone]

## 2017-03-02 NOTE — ANESTHESIA CARE TRANSFER NOTE
Patient: Ruba Farmer    Procedure(s):  Right shoulder arthroscopy subacromial decompression-choice anes - Wound Class: I-Clean    Diagnosis: Calcific tendonitis right shoulder  Diagnosis Additional Information: No value filed.    Anesthesia Type:   General, Periph. Nerve Block for postop pain, ETT     Note:  Airway :Nasal Cannula  Patient transferred to:PACU        Vitals: (Last set prior to Anesthesia Care Transfer)    CRNA VITALS  3/2/2017 1220 - 3/2/2017 1251      3/2/2017             NIBP: 134/87    Pulse: 85    NIBP Mean: 113    SpO2: 98 %                Electronically Signed By: SANJUANITA Cedeño CRNA  March 2, 2017  12:51 PM

## 2017-03-02 NOTE — IP AVS SNAPSHOT
MRN:[id number]                      After Visit Summary   3/2/2017    Ruba Farmer    MRN: [id number]           Thank you!     Thank you for choosing [facility] for your care. Our goal is always to provide you with excellent care. Hearing back from our patients is one way we can continue to improve our services. Please take a few minutes to complete the written survey that you may receive in the mail after you visit with us. Thank you!        Patient Information     Date Of Birth          1945        About your hospital stay     You were admitted on:  March 2, 2017 You last received care in the:  [facility] PreOP/Phase II    You were discharged on:  March 2, 2017       Who to Call     For medical emergencies, please call 911.  For non-urgent questions about your medical care, please call your primary care provider or clinic, [phone]  For questions related to your surgery, please call your surgery clinic        Attending Provider     Provider Dallas Perez MD Orthopedics       Primary Care Provider Office Phone # Fax #    Xiao Dasilva [phone]       [place] [id number] [place] [id number]        After Care Instructions      Diet as Tolerated       Return to diet before surgery, unless instructed otherwise.            Discharge Instructions       Review outpatient procedure discharge instructions with patient as directed by Provider            Ice to affected area       Ice pack to surgical site every 15 minutes per hour for 24 hours            Remove dressing - at 48 hours           Return to clinic       Return to clinic in 1 weeks            Shower        Cover dressing if dressing is not going to be changed today            Wound care       Do not immerse wound in water until sutures removed                  Further instructions from your care team                           Same Day Surgery Discharge  Instructions  Special Precautions After Surgery - Adult    1. It is not unusual to feel lightheaded or faint, up to 24 hours after surgery or while taking pain medication.  If you have these symptoms; sit for a few minutes before standing and have someone assist you when getting up.  2. You should rest and relax for the next 24 hours and must have someone stay with you for at least 24 hours after your discharge.  3. DO NOT DRIVE any vehicle or operate mechanical equipment for 24 hours following the end of your surgery.  DO NOT DRIVE while taking narcotic pain medications that have been prescribed by your physician.  If you had a limb operated on, you must be able to use it fully to drive.  4. DO NOT drink alcoholic beverages for 24 hours following surgery or while taking prescription pain medication.  5. Drink clear liquids (apple juice, ginger ale, broth, 7-Up, etc.).  Progress to your regular diet as you feel able.  6. Any questions call your physician and do not make important decisions for 24 hours.      __________________________________________________________________________________________________________________________________  IMPORTANT NUMBERS:    [facility] [place]:  [phone], [phone]  Pharmacy:  [phone]  Same Day Surgery:  [phone], Monday - Friday until 8:30 p.m.  Urgent Care:  [phone]  Emergency Room:  [phone]      [facility]:  [phone]                                                                             [facility]:  [phone] option 1  [facility]:  [phone]     [facility]:  [phone]   [facility]:  [phone]   Home Medical Equipment: [phone]  [facility]:  [phone]        Pending Results     No orders found from 2/28/2017 to 3/3/2017.            Admission Information     Date & Time Provider Department Dept. Phone    3/2/2017 Dallas Rene MD [facility]  PreOP/Phase -[phone]      Your Vitals Were     Blood Pressure Pulse Temperature Respirations Pulse Oximetry       125/63 60 97.6  F (36.4  C) (Oral) 16 95%       evOLEDt Information     The [facility] gives you secure access to your electronic health record. If you see a primary care provider, you can also send messages to your care team and make appointments. If you have questions, please call your primary care clinic.  If you do not have a primary care provider, please call [phone] and they will assist you.        Care EveryWhere ID     This is your Care EveryWhere ID. This could be used by other organizations to access your [facility] medical records  [id number]           Review of your medicines      UNREVIEWED medicines. Ask your doctor about these medicines        Dose / Directions    fluticasone 220 MCG/ACT Inhaler   Commonly known as:  FLOVENT HFA   Used for:  Intermittent asthma, with acute exacerbation        Dose:  2 puff   Inhale 2 puffs into the lungs 2 times daily Rinse out mouth after use.   Quantity:  1 Inhaler   Refills:  5       fluticasone 50 MCG/ACT spray   Commonly known as:  FLONASE   Used for:  Post-nasal drip        Dose:  1-2 spray   Spray 1-2 sprays into both nostrils daily   Quantity:  16 g   Refills:  5         START taking        Dose / Directions    HYDROmorphone 2 MG tablet   Commonly known as:  DILAUDID   Used for:  Incomplete tear of right rotator cuff        Dose:  2 mg   Take 1 tablet (2 mg) by mouth every 4 hours as needed for pain maximum 6 tablet(s) per day   Quantity:  40 tablet   Refills:  0       hydrOXYzine 25 MG tablet   Commonly known as:  ATARAX   Used for:  Incomplete tear of right rotator cuff        Dose:  25 mg   Take 1 tablet (25 mg) by mouth every 6 hours as needed for itching (and nausea)   Quantity:  40 tablet   Refills:  0         CONTINUE these medicines which have NOT CHANGED        Dose / Directions    albuterol 108 (90 BASE) MCG/ACT Inhaler   Commonly  known as:  PROAIR HFA/PROVENTIL HFA/VENTOLIN HFA   Used for:  Chronic cough        Dose:  2 puff   Inhale 2 puffs into the lungs every 6 hours as needed for shortness of breath / dyspnea   Quantity:  1 Inhaler   Refills:  1       ALLEGRA ALLERGY PO        Take by mouth daily   Refills:  0       COQ10 PO        Dose:  1 capsule   Take 1 capsule by mouth daily   Refills:  0       Digestive Enzyme Caps        Refills:  0       ibuprofen 200 MG tablet   Commonly known as:  ADVIL/MOTRIN   Used for:  Strain of lumbar region        TAKE 1 TO 3 TABLETS EVERY 6 HOURS AS NEEDED WITH FOOD for 7-14 days   Quantity:  120   Refills:  0       ipratropium 0.06 % spray   Commonly known as:  ATROVENT   Used for:  Post-nasal drip        Dose:  2 spray   Spray 2 sprays into both nostrils 3 times daily as needed for rhinitis   Quantity:  3 Box   Refills:  5       LORazepam 0.5 MG tablet   Commonly known as:  ATIVAN   Used for:  Anxiety        Dose:  0.5 mg   Take 1 tablet (0.5 mg) by mouth every 8 hours as needed for anxiety   Quantity:  20 tablet   Refills:  2       OVER-THE-COUNTER        D-monos takes after SIC to prevent UTIs   Refills:  0       POTASSIUM CITRATE PO        Dose:  1 tablet   Take 1 tablet by mouth daily   Refills:  0       PROBIOTIC DAILY PO        Dose:  1 tablet   Take 1 tablet by mouth daily   Refills:  0       pseudoePHEDrine 120 MG 12 hr tablet   Commonly known as:  SUDAFED   Used for:  Seasonal allergic rhinitis        Dose:  120 mg   Take 1 tablet (120 mg) by mouth every 12 hours   Quantity:  30 tablet   Refills:  5       Vitamin C 500 MG Caps        Dose:  500 mg   Take 500 mg by mouth daily   Refills:  0       vitamin D 2000 UNITS Caps        Dose:  2000 Units   Take 2,000 Units by mouth daily   Refills:  0            Where to get your medicines      These medications were sent to [facility] [place], [place] - [address]  [address] [address]     Phone:  [phone]      hydrOXYzine 25 MG tablet         Some of these will need a paper prescription and others can be bought over the counter. Ask your nurse if you have questions.     Bring a paper prescription for each of these medications     HYDROmorphone 2 MG tablet                Protect others around you: Learn how to safely use, store and throw away your medicines at www.disposemymeds.org.             Medication List: This is a list of all your medications and when to take them. Check marks below indicate your daily home schedule. Keep this list as a reference.      Medications           Morning Afternoon Evening Bedtime As Needed    albuterol 108 (90 BASE) MCG/ACT Inhaler   Commonly known as:  PROAIR HFA/PROVENTIL HFA/VENTOLIN HFA   Inhale 2 puffs into the lungs every 6 hours as needed for shortness of breath / dyspnea                                ALLEGRA ALLERGY PO   Take by mouth daily                                COQ10 PO   Take 1 capsule by mouth daily                                Digestive Enzyme Caps                                fluticasone 220 MCG/ACT Inhaler   Commonly known as:  FLOVENT HFA   Inhale 2 puffs into the lungs 2 times daily Rinse out mouth after use.                                fluticasone 50 MCG/ACT spray   Commonly known as:  FLONASE   Spray 1-2 sprays into both nostrils daily                                HYDROmorphone 2 MG tablet   Commonly known as:  DILAUDID   Take 1 tablet (2 mg) by mouth every 4 hours as needed for pain maximum 6 tablet(s) per day                                hydrOXYzine 25 MG tablet   Commonly known as:  ATARAX   Take 1 tablet (25 mg) by mouth every 6 hours as needed for itching (and nausea)                                ibuprofen 200 MG tablet   Commonly known as:  ADVIL/MOTRIN   TAKE 1 TO 3 TABLETS EVERY 6 HOURS AS NEEDED WITH FOOD for 7-14 days                                ipratropium 0.06 % spray   Commonly known as:  ATROVENT   Spray 2 sprays into both nostrils  3 times daily as needed for rhinitis                                LORazepam 0.5 MG tablet   Commonly known as:  ATIVAN   Take 1 tablet (0.5 mg) by mouth every 8 hours as needed for anxiety                                OVER-THE-COUNTER   D-monos takes after SIC to prevent UTIs                                POTASSIUM CITRATE PO   Take 1 tablet by mouth daily                                PROBIOTIC DAILY PO   Take 1 tablet by mouth daily                                pseudoePHEDrine 120 MG 12 hr tablet   Commonly known as:  SUDAFED   Take 1 tablet (120 mg) by mouth every 12 hours                                Vitamin C 500 MG Caps   Take 500 mg by mouth daily                                vitamin D 2000 UNITS Caps   Take 2,000 Units by mouth daily

## 2017-03-02 NOTE — ANESTHESIA PREPROCEDURE EVALUATION
Anesthesia Evaluation     . Pt has had prior anesthetic.     History of anesthetic complications     ROS/MED HX    ENT/Pulmonary:     (+)allergic rhinitis, Mild Persistent asthma Last exacerbation: exercise induced--doesn't use inhaler --symptoms stop when she stops activity ,, recent URI . .    Neurologic:  - neg neurologic ROS     Cardiovascular:     (+) ----. : . . . :. . Previous cardiac testing date:results:date: results:ECG reviewed date:2/20/17 results:NSR date: results:          METS/Exercise Tolerance:  >4 METS   Hematologic:  - neg hematologic  ROS       Musculoskeletal:   (+) , , other musculoskeletal- Calcified tendonitis right shoulder       GI/Hepatic:     (+) GERD Other GI/Hepatic polyp      Renal/Genitourinary:     (+) Other Renal/ Genitourinary, recurrent UTI       Endo:  - neg endo ROS       Psychiatric:     (+) psychiatric history anxiety      Infectious Disease:  - neg infectious disease ROS       Malignancy:      - no malignancy   Other:    - neg other ROS           Physical Exam  Normal systems: cardiovascular, pulmonary and dental    Airway   Mallampati: II  TM distance: >3 FB    Dental     Cardiovascular       Pulmonary                     Anesthesia Plan      History & Physical Review  History and physical reviewed and following examination; no interval change.    ASA Status:  2 .    NPO Status:  > 8 hours    Plan for General, Periph. Nerve Block for postop pain and ETT with Propofol and Intravenous induction. Maintenance will be Balanced.    PONV prophylaxis:  Ondansetron (or other 5HT-3) and Dexamethasone or Solumedrol  Additional equipment: Videolaryngoscope      Postoperative Care  Postoperative pain management:  IV analgesics, Oral pain medications and Peripheral nerve block (Single Shot).      Consents                          .

## 2017-03-02 NOTE — PROGRESS NOTES
Per patient's  request Norco was ordered for at home pain control instead of Dilaudid. Pt will follow directions on [place] from the pharmacist. and pt in agreement.

## 2017-03-03 NOTE — OP NOTE
PREOPERATIVE DIAGNOSES:     1.  Right shoulder impingement.   2.  Right shoulder calcific bursitis.      POSTOPERATIVE DIAGNOSES:     1.  Right shoulder impingement.   2.  Right shoulder calcific bursitis.   3.  Partial thickness rotator cuff tear, right shoulder.      PROCEDURES PERFORMED:     1.  Right shoulder arthroscopic subacromial decompression.   2.  Debridement of calcific bursitis, right shoulder.   3.  Right shoulder rotator cuff repair.      SURGEON:  Dallas Rene MD      ANESTHESIA:  General.      COMPLICATIONS:  None.      PRE-PROCEDURE NOTES:  After being informed of the risks, benefits and alternatives of the above procedures the patient Ruba Farmer desired to proceed.        DESCRIPTION OF PROCEDURE:  The patient was brought to the operating suite on 03/02/2017 where she was placed under general anesthesia and positioned in the low beach chair position.  She received    2 gm of Ancef preoperatively.  Her right lower extremity was prepped and draped in a manner appropriate for the procedures.  A time-out verification step was completed.      A standard arthroscopic portal was established, and arthroscopy revealed Grade 0 changes of the humeral head and glenoid cartilage with intact labrum and biceps anchor tendon.  There was no evidence of articular surface tearing of the subscapularis, infraspinatus or supraspinatus tendons.  The scope was placed in the subacromial space.  An extensive area of calcific tendinitis and calcium deposit was noted; this was more extensive than appreciated via the MRI.  Once debridement was completed there    was a significant side-to-side near full-thickness rent in the rotator cuff.        Initial arthroscopic repair was attempted, but bone quality was poor, and the implant failed.  Therefore a mini open side-to-side repair was performed.  This provided excellent stability and reapproximation    of the tendon.  The deltoid was repaired back to the acromion  using #1 figure-of-eight Ethibond sutures through bone x2.  The deltoid fascia was repaired with 0 Vicryl.  Subcutaneous tissue was closed    with 2-0 Vicryl, and the skin was closed with a running 4-0 intradermal stitch and Steri-Strips.  A simple sling was utilized.  The patient tolerated the procedure well and was returned to Post Anesthesia Recovery in stable condition.         VIJAYA GONZALES MD             D: 2017 12:52   T: 2017 07:28   MT: POP#[id number]      Name:     TUNDE VANG   MRN:      [id number]        Account:        [id number]   :      1945           Procedure Date: 2017      Document: [id number]       cc: Xiao Dasilva MD

## 2017-03-08 NOTE — PROGRESS NOTES
Outpatient Physical Therapy Discharge Note     Patient: Ruba Farmer  : 1945    Beginning/End Dates of Reporting Period:  17 to 3/8/2017    Referring Provider: Xiao Dasilva MD    Therapy Diagnosis: Right glenohumeral primary impingement     Client Self Report: Recieved Xrays yesterday and the pain has not improved.  Worried that tendons have calcified further.      Objective Measurements:  Objective Measure: Time able to sleep without waking due to neck pain  Details: 4 hours  Objective Measure: Cervical AROM  Details: Rt rot=75 deg; Lt rot=70 deg  Objective Measure: Radiograph results cervical spine  Details: 17 XR cervical spine IMPRESSION: Four views of the cervical spine show no acute finding.  Multilevel degenerative disc disease is present from C4 to C7.  Objective Measure: Radiograph results right shoulder   Details: 17 XR cervical spine IMPRESSION: 3 views of the right shoulder show no acute finding. Calcific tendinitis of the rotator cuff is again suggested and more prominent compared to the prior study.         Goals:  Goal Identifier     Goal Description Following PT interventions, pt will be able to drive for >=2 hours without low back pain to allow for normalized community mobility.   Target Date 16   Date Met  16   Progress:     Goal Identifier     Goal Description Following PT interventions, pt will have 4+/5 left glut med strength to provide proper lumbopelvic stability with weightbearing activities.   Target Date 16   Date Met  16   Progress:     Goal Identifier     Goal Description Following PT interventions, pt will be independent with her HEP to prevent future occurance of pain and difficulty.   Target Date 16   Date Met  16   Progress:     Goal Identifier     Goal Description Following PT interventions, pt will have >=4+/5 right GH ER strength to decrease impingement with overhead activities.   Target Date 16   Date Met   09/02/16   Progress:     Goal Identifier     Goal Description Following PT interventions, pt will be able to sleep on right side for >=7 hours without waking due to pain to allow for restorative sleep pattern.   Target Date 01/20/17   Date Met      Progress:       Progress Toward Goals:   Progress this reporting period: Marcy underwent right shoulder subacromial decompression and debridement with mini open rotator cuff repair on 3/2/17.            Plan:  Discharge from therapy.    Discharge:    Reason for Discharge: Change in medical status.    Equipment Issued: TherKeepiod    Discharge Plan: Patient to follow post-surgical protocol    Myrna Pak PT, DPT  Doctor of Physical Therapy #[id number]  [facility]  [phone]  [email or web address]

## 2017-03-13 ENCOUNTER — TELEPHONE (OUTPATIENT)
Dept: FAMILY MEDICINE | Facility: CLINIC | Age: 72
End: 2017-03-13

## 2017-03-13 DIAGNOSIS — Z13.820 SCREENING FOR OSTEOPOROSIS: Primary | ICD-10-CM

## 2017-03-13 NOTE — TELEPHONE ENCOUNTER
Pt would like order for DEXA scan. She says Dr Rene suggested she do this. Please put in order and leave her a message that this is done so she can schedule.

## 2017-04-03 ENCOUNTER — HOSPITAL ENCOUNTER (OUTPATIENT)
Dept: PHYSICAL THERAPY | Facility: CLINIC | Age: 72
Setting detail: THERAPIES SERIES
End: 2017-04-03
Attending: ORTHOPAEDIC SURGERY
Payer: MEDICARE

## 2017-04-03 PROCEDURE — G8985 CARRY GOAL STATUS: HCPCS | Mod: GP,CI | Performed by: PHYSICAL THERAPIST

## 2017-04-03 PROCEDURE — 97140 MANUAL THERAPY 1/> REGIONS: CPT | Mod: GP | Performed by: PHYSICAL THERAPIST

## 2017-04-03 PROCEDURE — 97110 THERAPEUTIC EXERCISES: CPT | Mod: GP | Performed by: PHYSICAL THERAPIST

## 2017-04-03 PROCEDURE — G8984 CARRY CURRENT STATUS: HCPCS | Mod: GP,CL | Performed by: PHYSICAL THERAPIST

## 2017-04-03 PROCEDURE — 97161 PT EVAL LOW COMPLEX 20 MIN: CPT | Mod: GP | Performed by: PHYSICAL THERAPIST

## 2017-04-03 PROCEDURE — 40000718 ZZHC STATISTIC PT DEPARTMENT ORTHO VISIT: Performed by: PHYSICAL THERAPIST

## 2017-04-04 ENCOUNTER — HOSPITAL ENCOUNTER (OUTPATIENT)
Dept: BONE DENSITY | Facility: CLINIC | Age: 72
Discharge: HOME | End: 2017-04-04
Attending: FAMILY MEDICINE | Admitting: FAMILY MEDICINE
Payer: MEDICARE

## 2017-04-04 DIAGNOSIS — Z78.0 MENOPAUSE: ICD-10-CM

## 2017-04-04 PROCEDURE — 77080 DXA BONE DENSITY AXIAL: CPT

## 2017-04-05 ENCOUNTER — HOSPITAL ENCOUNTER (OUTPATIENT)
Dept: PHYSICAL THERAPY | Facility: CLINIC | Age: 72
Setting detail: THERAPIES SERIES
End: 2017-04-05
Attending: ORTHOPAEDIC SURGERY
Payer: MEDICARE

## 2017-04-05 PROCEDURE — 40000718 ZZHC STATISTIC PT DEPARTMENT ORTHO VISIT: Performed by: PHYSICAL THERAPIST

## 2017-04-05 PROCEDURE — 97110 THERAPEUTIC EXERCISES: CPT | Mod: GP | Performed by: PHYSICAL THERAPIST

## 2017-04-05 PROCEDURE — 97140 MANUAL THERAPY 1/> REGIONS: CPT | Mod: GP | Performed by: PHYSICAL THERAPIST

## 2017-04-05 NOTE — PROGRESS NOTES
04/03/17 1000   General Information   Type of Visit Initial OP Ortho PT Evaluation   Start of Care Date 04/03/17   Referring Physician Corbin Velez PA-C   Patient/Family Goals Statement To be able to lift weights again; to have improved ROM of shoulder   Orders Evaluate and Treat   Orders Comment Start PT after 4/3/17 with PROM/AAROM; no strengthening until after seeing Dr. Rene at 6 week post op appointment   Date of Order 03/15/17   Insurance Type Medicare;Other   Insurance Comments/Visits Authorized Medicare; [employer] ($1917 remaining in PT/SLP cap; no ionthophoresis)   Surgical/Medical history reviewed Yes   General Information Comments PMHX/Co-morbidities: Anxiety; gastroesophageal reflux disease; mild persistent asthma without complication       Present No   Body Part(s)   Body Part(s) Shoulder   Presentation and Etiology   Pertinent history of current problem (include personal factors and/or comorbidities that impact the POC) Patient reports: had surgery performed early March.  Surgeon reported he had never seen so much Calcium in a shoulder.   He also had to tack down the rotator cuff with 3 sutures.  Had been wearing the sling, however not the last few days.  Feels like thr shoulder is getting tight.     Impairments A. Pain;F. Decreased strength and endurance;R. Other   Impairment comment Surgery   Functional Limitations perform activities of daily living;perform required work activities;perform desired leisure / sports activities   Symptom Location Right global shoulder   How/Where did it occur From Degenerative Joint Disease   Onset date of current episode/exacerbation 03/02/17   Chronicity New   Pain rating (0-10 point scale) Best (/10);Worst (/10)   Best (/10) 2   Worst (/10) 7   Pain quality A. Sharp;B. Dull;C. Aching   Frequency of pain/symptoms A. Constant   Pain/symptoms are: The same all the time   Pain/symptoms exacerbated by C. Lifting;D. Carrying;G. Certain  positions;M. Other   Pain exacerbation comment Sleeping   Pain/symptoms eased by A. Sitting;B. Walking   Progression of symptoms since onset: Improved   Current / Previous Interventions   Diagnostic Tests: X-ray   X-ray Results Results   X-ray results 1/5/17 Right GH radiograph: IMPRESSION: 3 views of the right shoulder show no acute finding.  Calcific tendinitis of the rotator cuff is again suggested and more prominent compared to the prior study.   Prior Level of Function   Prior Level of Function-Mobility Independent   Prior Level of Function-ADLs Independent   Current Level of Function   Current Community Support Family/friend caregiver   Living environment [facility]   Fall Risk Screen   Fall screen completed by PT   Per patient - Fall 2 or more times in past year? Yes   Per patient - Fall with injury in past year? No   Is patient a fall risk? No   Fall screen comments Secondary to slipper surface   Functional Scales   Functional Scales Other   Other Scales  SPADI=55.45% disability   Shoulder Objective Findings   Side (if bilateral, select both right and left) Right   Posture Jovani rounded shoulders left > right   Right Shoulder Flexion AROM Left=170 deg   Right Shoulder ER PROM 0 deg (left=72 deg)   Planned Therapy Interventions   Planned Therapy Interventions ADL retraining;joint mobilization;manual therapy;motor coordination training;neuromuscular re-education;ROM;strengthening;stretching   Planned Modality Interventions   Planned Modality Interventions Cryotherapy   Clinical Impression   Criteria for Skilled Therapeutic Interventions Met yes, treatment indicated   PT Diagnosis s/p right SAD and rotator cuff repair performed 3/2/17    Influenced by the following impairments Pain; weakness; impaired ROM; impaired posture   Functional limitations due to impairments Unable to reach overhead; pain and difficulty typing, sleeping, and driving   Clinical Presentation Stable/Uncomplicated   Clinical Presentation  Rationale (+) motivation; previous success with PT      Clinical Decision Making (Complexity) Low complexity   Therapy Frequency 2 times/Week  (2x/week for 6 weeks; 1x/week for 6 weeks)   Predicted Duration of Therapy Intervention (days/wks) 12 weeks   Risk & Benefits of therapy have been explained Yes   Patient, Family & other staff in agreement with plan of care Yes   Clinical Impression Comments Marcy is a pleasant 72 yo female who presents today 4.5 weeks s/p right rotator cuff repair and subacromial decompression.  She will benefit from skilled physical therapy to address impairments and functional limitations.   Education Assessment   Preferred Learning Style Listening;Demonstration;Pictures/video   Barriers to Learning No barriers   ORTHO GOALS   PT Ortho Eval Goals 1;2;3   Ortho Goal 1   Goal Description Following PT interventions, patient will have >=170 degrees of right GH flexion AROM to allow for normalized overhead ADL's.   Target Date 05/29/17   Ortho Goal 2   Goal Description Following PT interventions, patient will have >=4+/5 right GH ER strength to reduce impingement with overhead ADL's.   Target Date 06/26/17   Ortho Goal 3   Goal Description Following PT interventions, patient will be able to type for >=3 hours with <3/10 right GH pain.     Target Date 05/15/17       Myrna Pak, PT, DPT  Doctor of Physical Therapy #[id number]  [facility]  [phone]  [email or web address]'

## 2017-04-05 NOTE — PROGRESS NOTES
[facility]          OUTPATIENT PHYSICAL THERAPY ORTHOPEDIC EVALUATION  PLAN OF TREATMENT FOR OUTPATIENT REHABILITATION  (COMPLETE FOR INITIAL CLAIMS ONLY)  Patient's Last Name, First Name, M.I.  YOB: 1945  Ruba Farmer    Provider s Name:  [facility]   Medical Record No.  [id number]   Start of Care Date:  04/03/17   Onset Date:  03/02/17   Type:     _X__PT   ___OT   ___SLP Medical Diagnosis:        PT Diagnosis:  s/p right SAD and rotator cuff repair performed 3/2/17    Visits from SOC:  1      _________________________________________________________________________________  Plan of Treatment/Functional Goals:  ADL retraining, joint mobilization, manual therapy, motor coordination training, neuromuscular re-education, ROM, strengthening, stretching     Cryotherapy     Goals     Goal Description: Following PT interventions, patient will have >=170 degrees of right GH flexion AROM to allow for normalized overhead ADL's.  Target Date: 05/29/17       Goal Description: Following PT interventions, patient will have >=4+/5 right GH ER strength to reduce impingement with overhead ADL's.  Target Date: 06/26/17       Goal Description: Following PT interventions, patient will be able to type for >=3 hours with <3/10 right GH pain.    Target Date: 05/15/17              Therapy Frequency:  2 times/Week (2x/week for 6 weeks; 1x/week for 6 weeks)  Predicted Duration of Therapy Intervention:  12 weeks    Myrna Pak, PT                 I CERTIFY THE NEED FOR THESE SERVICES FURNISHED UNDER        THIS PLAN OF TREATMENT AND WHILE UNDER MY CARE .             Physician Signature               Date    X_____________________________________________________                             Certification Date From:      Certification Date To:       Referring Provider:  Corbin Velez PA-C    Initial Assessment        See Epic Evaluation Start of Care Date: 04/03/17

## 2017-04-06 ENCOUNTER — TELEPHONE (OUTPATIENT)
Dept: FAMILY MEDICINE | Facility: CLINIC | Age: 72
End: 2017-04-06

## 2017-04-06 DIAGNOSIS — J45.30 MILD PERSISTENT ASTHMA WITHOUT COMPLICATION: Primary | ICD-10-CM

## 2017-04-07 RX ORDER — DOXYCYCLINE 100 MG/1
100 CAPSULE ORAL 2 TIMES DAILY
Qty: 28 CAPSULE | Refills: 0 | Status: SHIPPED | OUTPATIENT
Start: 2017-04-07 | End: 2017-11-01

## 2017-04-07 NOTE — TELEPHONE ENCOUNTER
Pt called, states she has doxycycline on hand when she travels. No symptoms at this point, states she uses this for asthma issues. Pt would like on hand. Emerald Bryan RN

## 2017-04-11 ENCOUNTER — HOSPITAL ENCOUNTER (OUTPATIENT)
Dept: PHYSICAL THERAPY | Facility: CLINIC | Age: 72
Setting detail: THERAPIES SERIES
End: 2017-04-11
Attending: ORTHOPAEDIC SURGERY
Payer: MEDICARE

## 2017-04-11 PROCEDURE — 40000718 ZZHC STATISTIC PT DEPARTMENT ORTHO VISIT: Performed by: PHYSICAL THERAPIST

## 2017-04-11 PROCEDURE — 97110 THERAPEUTIC EXERCISES: CPT | Mod: GP | Performed by: PHYSICAL THERAPIST

## 2017-04-11 PROCEDURE — 97140 MANUAL THERAPY 1/> REGIONS: CPT | Mod: GP | Performed by: PHYSICAL THERAPIST

## 2017-04-17 ENCOUNTER — HOSPITAL ENCOUNTER (OUTPATIENT)
Dept: PHYSICAL THERAPY | Facility: CLINIC | Age: 72
Setting detail: THERAPIES SERIES
End: 2017-04-17
Attending: ORTHOPAEDIC SURGERY
Payer: MEDICARE

## 2017-04-17 PROCEDURE — 97110 THERAPEUTIC EXERCISES: CPT | Mod: GP | Performed by: PHYSICAL THERAPIST

## 2017-04-17 PROCEDURE — 97140 MANUAL THERAPY 1/> REGIONS: CPT | Mod: GP | Performed by: PHYSICAL THERAPIST

## 2017-04-17 PROCEDURE — 40000718 ZZHC STATISTIC PT DEPARTMENT ORTHO VISIT: Performed by: PHYSICAL THERAPIST

## 2017-04-19 ENCOUNTER — OFFICE VISIT (OUTPATIENT)
Dept: FAMILY MEDICINE | Facility: CLINIC | Age: 72
End: 2017-04-19
Payer: COMMERCIAL

## 2017-04-19 ENCOUNTER — HOSPITAL ENCOUNTER (OUTPATIENT)
Dept: PHYSICAL THERAPY | Facility: CLINIC | Age: 72
Setting detail: THERAPIES SERIES
End: 2017-04-19
Attending: ORTHOPAEDIC SURGERY
Payer: MEDICARE

## 2017-04-19 VITALS
TEMPERATURE: 97.8 F | BODY MASS INDEX: 30.61 KG/M2 | WEIGHT: 194 LBS | DIASTOLIC BLOOD PRESSURE: 74 MMHG | HEART RATE: 78 BPM | OXYGEN SATURATION: 97 % | SYSTOLIC BLOOD PRESSURE: 136 MMHG

## 2017-04-19 DIAGNOSIS — Z23 NEED FOR SHINGLES VACCINE: ICD-10-CM

## 2017-04-19 DIAGNOSIS — H61.21 IMPACTED CERUMEN OF RIGHT EAR: Primary | ICD-10-CM

## 2017-04-19 PROCEDURE — 90471 IMMUNIZATION ADMIN: CPT | Performed by: NURSE PRACTITIONER

## 2017-04-19 PROCEDURE — 97110 THERAPEUTIC EXERCISES: CPT | Mod: GP | Performed by: PHYSICAL THERAPIST

## 2017-04-19 PROCEDURE — 97140 MANUAL THERAPY 1/> REGIONS: CPT | Mod: GP | Performed by: PHYSICAL THERAPIST

## 2017-04-19 PROCEDURE — 40000718 ZZHC STATISTIC PT DEPARTMENT ORTHO VISIT: Performed by: PHYSICAL THERAPIST

## 2017-04-19 PROCEDURE — 99213 OFFICE O/P EST LOW 20 MIN: CPT | Mod: 25 | Performed by: NURSE PRACTITIONER

## 2017-04-19 PROCEDURE — 90736 HZV VACCINE LIVE SUBQ: CPT | Performed by: NURSE PRACTITIONER

## 2017-04-19 ASSESSMENT — PATIENT HEALTH QUESTIONNAIRE - PHQ9
5. POOR APPETITE OR OVEREATING: NOT AT ALL
5. POOR APPETITE OR OVEREATING: NOT AT ALL

## 2017-04-19 ASSESSMENT — ANXIETY QUESTIONNAIRES
1. FEELING NERVOUS, ANXIOUS, OR ON EDGE: SEVERAL DAYS
7. FEELING AFRAID AS IF SOMETHING AWFUL MIGHT HAPPEN: NOT AT ALL
1. FEELING NERVOUS, ANXIOUS, OR ON EDGE: SEVERAL DAYS
2. NOT BEING ABLE TO STOP OR CONTROL WORRYING: SEVERAL DAYS
GAD7 TOTAL SCORE: 2
6. BECOMING EASILY ANNOYED OR IRRITABLE: NOT AT ALL
6. BECOMING EASILY ANNOYED OR IRRITABLE: NOT AT ALL
3. WORRYING TOO MUCH ABOUT DIFFERENT THINGS: NOT AT ALL
5. BEING SO RESTLESS THAT IT IS HARD TO SIT STILL: NOT AT ALL
7. FEELING AFRAID AS IF SOMETHING AWFUL MIGHT HAPPEN: NOT AT ALL
3. WORRYING TOO MUCH ABOUT DIFFERENT THINGS: NOT AT ALL
5. BEING SO RESTLESS THAT IT IS HARD TO SIT STILL: NOT AT ALL
2. NOT BEING ABLE TO STOP OR CONTROL WORRYING: SEVERAL DAYS
GAD7 TOTAL SCORE: 2

## 2017-04-19 NOTE — NURSING NOTE
Screening Questionnaire for Adult Immunization    Are you sick today?   No   Do you have allergies to medications, food, a vaccine component or latex?   Codeine and Macrobid   Have you ever had a serious reaction after receiving a vaccination?   No   Do you have a long-term health problem with heart disease, lung disease, asthma, kidney disease, metabolic disease (e.g. diabetes), anemia, or other blood disorder?   No   Do you have cancer, leukemia, HIV/AIDS, or any other immune system problem?   No   In the past 3 months, have you taken medications that affect  your immune system, such as prednisone, other steroids, or anticancer drugs; drugs for the treatment of rheumatoid arthritis, Crohn s disease, or psoriasis; or have you had radiation treatments?   No but surgery from rotator cuff    Have you had a seizure, or a brain or other nervous system problem?   No   During the past year, have you received a transfusion of blood or blood     products, or been given immune (gamma) globulin or antiviral drug?   No   For women: Are you pregnant or is there a chance you could become        pregnant during the next month?   No   Have you received any vaccinations in the past 4 weeks?   No     Immunization questionnaire answers were all negative.      MNVFC doesn't apply on this patient     Prior to injection verified patient identity using patient's name and date of birth.    Per orders of FLAVIO Araya , injection of Zoster Vaccine Live given by Azael Castillo. Patient instructed to remain in clinic for 20 minutes afterwards, and to report any adverse reaction to me immediately.       Screening performed by Azael Castillo on 4/19/2017 at 1:16 PM.

## 2017-04-19 NOTE — PATIENT INSTRUCTIONS
Earwax, Home Treatment    Everyone produces earwax from the lining of the ear canal. It serves to lubricate and protect the ear. The wax that forms in the canal naturally moves toward the outside of the ear and falls out. Sometimes the ear canal may contain too much wax. This can cause a blockage and loss of hearing. Directions are given below for home treatment.  Home care  If your doctor has advised you to remove a wax blockage yourself, follow these directions:    Unless a medicine was prescribed, you may use an over-the-counter product made for clearing earwax. These contain carbamide peroxide. Lie down with the blocked ear facing upward. Apply one dropper full of medicine and wait a few minutes. Grasp the outer ear and wiggle it to help the solution enter the canal.    Lean over a sink or basin with the blocked ear facing downward. Use a bulb syringe filled with warm (not hot or cold) water to rinse the ear several times. Use gentle pressure only.    If you are having trouble draining the water out of your ear canal, put a few drops of rubbing alcohol (isopropyl alcohol) into the ear canal. This will help remove the remaining water.    Repeat this procedure once a day for up to three days, or until your hearing is back to normal. Do not use this treatment for more than three days in a row.  Don ts    Don t use cold water to rinse the ear. This will make you dizzy.    Don t perform this procedure if you have an ear infection.    Don t perform this procedure if you have a ruptured eardrum.    Don t use cotton swabs, matches, hairpins, keys, or other objects to  clean  the ear canal. This can cause infection of the ear canal or rupture the eardrum. Because of their size and shape, cotton swabs can push earwax deeper into the ear canal instead of removing it.  Follow-up care  Follow up with your health care provider if you are not improving after three cleaning attempts, or as advised.  When to seek medical  advice  Call your health care provider right away if any of these occur:    Worsening ear pain    Fever of 101 F (38.3 C) or higher, or as directed by your health care provider    Hearing does not return to normal after three days of treatment    Fluid drainage or bleeding from the ear canal    Swelling, redness, or tenderness of the outer ear    Headache, neck pain, or stiff neck    0153-7691 [employer]. [address] [place] [address]. All rights reserved. This information is not intended as a substitute for professional medical care. Always follow your healthcare professional's instructions.

## 2017-04-19 NOTE — NURSING NOTE
"Chief Complaint   Patient presents with     Otitis Media     /74  Pulse 78  Temp 97.8  F (36.6  C) (Tympanic)  Wt 194 lb (88 kg)  SpO2 97%  BMI 30.61 kg/m2 Estimated body mass index is 30.61 kg/(m^2) as calculated from the following:    Height as of 2/20/17: 5' 6.75\" (1.695 m).    Weight as of this encounter: 194 lb (88 kg).  bp completed using cuff size: regular      Health Maintenance that is potentially due pending provider review:  Hepatitis C screening, pnuemonia shot, tetanus, PHQ9, GAD7         "

## 2017-04-19 NOTE — MR AVS SNAPSHOT
After Visit Summary   4/19/2017    Ruba Farmer    MRN: [id number]           Patient Information     Date Of Birth          1945        Visit Information        Provider Department      4/19/2017 1:00 PM Romy Araya APRN CNP [facility]        Today's Diagnoses     Need for shingles vaccine    -  1    Impacted cerumen of right ear          Care Instructions      Earwax, Home Treatment    Everyone produces earwax from the lining of the ear canal. It serves to lubricate and protect the ear. The wax that forms in the canal naturally moves toward the outside of the ear and falls out. Sometimes the ear canal may contain too much wax. This can cause a blockage and loss of hearing. Directions are given below for home treatment.  Home care  If your doctor has advised you to remove a wax blockage yourself, follow these directions:    Unless a medicine was prescribed, you may use an over-the-counter product made for clearing earwax. These contain carbamide peroxide. Lie down with the blocked ear facing upward. Apply one dropper full of medicine and wait a few minutes. Grasp the outer ear and wiggle it to help the solution enter the canal.    Lean over a sink or basin with the blocked ear facing downward. Use a bulb syringe filled with warm (not hot or cold) water to rinse the ear several times. Use gentle pressure only.    If you are having trouble draining the water out of your ear canal, put a few drops of rubbing alcohol (isopropyl alcohol) into the ear canal. This will help remove the remaining water.    Repeat this procedure once a day for up to three days, or until your hearing is back to normal. Do not use this treatment for more than three days in a row.  Don ts    Don t use cold water to rinse the ear. This will make you dizzy.    Don t perform this procedure if you have an ear infection.    Don t perform this procedure if you have a ruptured eardrum.    Don t use cotton  swabs, matches, hairpins, keys, or other objects to  clean  the ear canal. This can cause infection of the ear canal or rupture the eardrum. Because of their size and shape, cotton swabs can push earwax deeper into the ear canal instead of removing it.  Follow-up care  Follow up with your health care provider if you are not improving after three cleaning attempts, or as advised.  When to seek medical advice  Call your health care provider right away if any of these occur:    Worsening ear pain    Fever of 101 F (38.3 C) or higher, or as directed by your health care provider    Hearing does not return to normal after three days of treatment    Fluid drainage or bleeding from the ear canal    Swelling, redness, or tenderness of the outer ear    Headache, neck pain, or stiff neck    0569-4039 [employer]. [address] [place] [address]. All rights reserved. This information is not intended as a substitute for professional medical care. Always follow your healthcare professional's instructions.              Follow-ups after your visit        Your next 10 appointments already scheduled     Apr 24, 2017 11:00 AM CDT   Treatment with Myrna Pak PT   [facility] ([facility])    [address] [place] [id number] [phone]            Apr 26, 2017 11:00 AM CDT   Treatment with Myrna Pak PT   [facility] ([facility])    [address] [place] [id number]   [phone]              Who to contact     If you have questions or need follow up information about today's clinic visit or your schedule please contact [facility] directly at [phone].  Normal or non-critical lab and imaging results will be communicated to you by MyChart, letter or phone within 4 business days after the clinic has received the results. If you do not hear from us within 7 days, please contact the  clinic through [employer] or phone. If you have a critical or abnormal lab result, we will notify you by phone as soon as possible.  Submit refill requests through [employer] or call your pharmacy and they will forward the refill request to us. Please allow 3 business days for your refill to be completed.          Additional Information About Your Visit        [employer] Information     [employer] gives you secure access to your electronic health record. If you see a primary care provider, you can also send messages to your care team and make appointments. If you have questions, please call your primary care clinic.  If you do not have a primary care provider, please call [phone] and they will assist you.        Care EveryWhere ID     This is your Care EveryWhere ID. This could be used by other organizations to access your [facility] medical records  [id number]        Your Vitals Were     Pulse Temperature Pulse Oximetry BMI (Body Mass Index)          78 97.8  F (36.6  C) (Tympanic) 97% 30.61 kg/m2         Blood Pressure from Last 3 Encounters:   04/19/17 136/74   03/02/17 125/66   02/20/17 126/80    Weight from Last 3 Encounters:   04/19/17 194 lb (88 kg)   02/20/17 191 lb (86.6 kg)   11/18/16 192 lb 6.4 oz (87.3 kg)              Today, you had the following     No orders found for display         Today's Medication Changes          These changes are accurate as of: 4/19/17  1:54 PM.  If you have any questions, ask your nurse or doctor.               Start taking these medicines.        Dose/Directions    zoster vaccine live (PF) injection   Commonly known as:  ZOSTAVAX   Used for:  Need for shingles vaccine   Started by:  Romy Araya APRN CNP        Dose:  1 each   Inject 0.65 mLs Subcutaneous once for 1 dose   Quantity:  0.65 mL   Refills:  0            Where to get your medicines      These medications were sent to [facility] [address]  [facility] [address]     Phone:  [phone]     zoster vaccine live (PF) injection                Primary Care Provider Office Phone # Fax #    Xiao Dasilva [phone]       [facility] [address] [facility] [address]        Thank you!     Thank you for choosing [facility]  for your care. Our goal is always to provide you with excellent care. Hearing back from our patients is one way we can continue to improve our services. Please take a few minutes to complete the written survey that you may receive in the mail after your visit with us. Thank you!             Your Updated Medication List - Protect others around you: Learn how to safely use, store and throw away your medicines at www.disposemymeds.org.          This list is accurate as of: 4/19/17  1:54 PM.  Always use your most recent med list.                   Brand Name Dispense Instructions for use    albuterol 108 (90 BASE) MCG/ACT Inhaler    PROAIR HFA/PROVENTIL HFA/VENTOLIN HFA    1 Inhaler    Inhale 2 puffs into the lungs every 6 hours as needed for shortness of breath / dyspnea       ALLEGRA ALLERGY PO      Take by mouth daily       COQ10 PO      Take 1 capsule by mouth daily       Digestive Enzyme Caps          doxycycline 100 MG capsule    VIBRAMYCIN    28 capsule    Take 1 capsule (100 mg) by mouth 2 times daily       fluticasone 220 MCG/ACT Inhaler    FLOVENT HFA    1 Inhaler    Inhale 2 puffs into the lungs 2 times daily Rinse out mouth after use.       fluticasone 50 MCG/ACT spray    FLONASE    16 g    Spray 1-2 sprays into both nostrils daily       ibuprofen 200 MG tablet    ADVIL/MOTRIN    120    TAKE 1 TO 3 TABLETS EVERY 6 HOURS AS NEEDED WITH FOOD for 7-14 days       ipratropium 0.06 % spray    ATROVENT    3 Box    Spray 2 sprays into both nostrils 3 times daily as needed for rhinitis       LORazepam 0.5 MG tablet    ATIVAN    20 tablet    Take 1 tablet (0.5 mg) by mouth every 8 hours  as needed for anxiety       OVER-THE-COUNTER      D-monos takes after SIC to prevent UTIs       POTASSIUM CITRATE PO      Take 1 tablet by mouth daily       PROBIOTIC DAILY PO      Take 1 tablet by mouth daily       pseudoePHEDrine 120 MG 12 hr tablet    SUDAFED    30 tablet    Take 1 tablet (120 mg) by mouth every 12 hours       Vitamin C 500 MG Caps      Take 500 mg by mouth daily       vitamin D 2000 UNITS Caps      Take 2,000 Units by mouth daily       zoster vaccine live (PF) injection    ZOSTAVAX    0.65 mL    Inject 0.65 mLs Subcutaneous once for 1 dose

## 2017-04-19 NOTE — PROGRESS NOTES
SUBJECTIVE:                                                    Ruba Farmer is a 71 year old female who presents to clinic today for the following health issues:      Ear Infection       Duration: 8 days     Description (location/character/radiation): right ear     Intensity:  mild    Accompanying signs and symptoms: feeling that something is in ear, feels like water in the ear, the ear pops if she swallows hard, also makes clicking sound, ever since surgery has been swollen because of the anesthesia, tired     History (similar episodes/previous evaluation): None    Precipitating or alleviating factors: None    Therapies tried and outcome: none     Ruba Farmer is a 71 year old female who presents with right ear fullness and pressure for 10 day(s).   Severity: moderate   Additional symptoms include none.      History of recurrent otitis: no    Past Medical History:   Diagnosis Date     Bronchitis     pt reports yearly bronchitis     Diagnostic skin and sensitization tests (aka ALLERGENS) 7/22/15 IgE tests all NEGATIVE for environmental allergens.      Nonallergic rhinitis     7/22/15 IgE tests all NEGATIVE for environmental allergens.      Pneumonia     hx of several episodes of pneumonia     Recurrent UTI        Social History   Substance Use Topics     Smoking status: Never Smoker     Smokeless tobacco: Never Used     Alcohol use Yes      Comment: 2 daily (wine)       REVIEW OF SYSTEMS  ROS:   CONSTITUTIONAL:NEGATIVE for fever, chills, change in weight  INTEGUMENTARY/SKIN: NEGATIVE for worrisome rashes, moles or lesions  EYES: NEGATIVE for vision changes or irritation  ENT/MOUTH: See HPI  RESP:NEGATIVE for significant cough or SOB  CV: NEGATIVE for chest pain, palpitations or peripheral edema  GI: NEGATIVE for nausea, abdominal pain, heartburn, or change in bowel habits    Problem list and histories reviewed & adjusted, as indicated.  Additional history: as documented      Reviewed and updated as needed this  visit by clinical staff  Tobacco  Allergies  Meds  Med Hx  Surg Hx  Fam Hx  Soc Hx     OBJECTIVE:  /74  Pulse 78  Temp 97.8  F (36.6  C) (Tympanic)  Wt 194 lb (88 kg)  SpO2 97%  BMI 30.61 kg/m2   The right TM is normal: no effusions, no erythema, and normal landmarks    The right auditory canal is normal and without drainage, edema or erythema and small amount of hardened cerumen noted near TM cerumen.   The left TM is normal: no effusions, no erythema, and normal landmarks  The left auditory canal is normal and without drainage, edema or erythema  Oropharynx exam is normal: no lesions, erythema, adenopathy or exudate.  GENERAL: no acute distress  EYES: EOMI,  PERRL, conjunctiva clear  NECK: supple, non-tender to palpation, no adenopathy noted  RESP: lungs clear to auscultation - no rales, rhonchi or wheezes  CV: regular rates and rhythm, normal S1 S2, no murmur noted  SKIN: no suspicious lesions or rashes     ASSESSMENT/PLAN:  Cerumen impaction    (H61.21) Impacted cerumen of right ear  (primary encounter diagnosis)  Plan: Right ear irrigated in clinic- No fluid present behind TM after irrigation.    (Z23) Need for shingles vaccine  Plan: zoster vaccine live, PF, (ZOSTAVAX) injection     Advised patient to continue with allergy medications.    Orquidea Ansari RN [employer] [facility] NP        Reviewed chart and examined the patient I am in agreement with the above note  Romy Araya CNP

## 2017-04-20 ASSESSMENT — PATIENT HEALTH QUESTIONNAIRE - PHQ9: SUM OF ALL RESPONSES TO PHQ QUESTIONS 1-9: 6

## 2017-04-20 ASSESSMENT — ANXIETY QUESTIONNAIRES: GAD7 TOTAL SCORE: 2

## 2017-04-24 ENCOUNTER — HOSPITAL ENCOUNTER (OUTPATIENT)
Dept: PHYSICAL THERAPY | Facility: CLINIC | Age: 72
Setting detail: THERAPIES SERIES
End: 2017-04-24
Attending: ORTHOPAEDIC SURGERY
Payer: MEDICARE

## 2017-04-24 PROCEDURE — 97140 MANUAL THERAPY 1/> REGIONS: CPT | Mod: GP | Performed by: PHYSICAL THERAPIST

## 2017-04-24 PROCEDURE — 40000718 ZZHC STATISTIC PT DEPARTMENT ORTHO VISIT: Performed by: PHYSICAL THERAPIST

## 2017-04-24 PROCEDURE — 97110 THERAPEUTIC EXERCISES: CPT | Mod: GP | Performed by: PHYSICAL THERAPIST

## 2017-04-26 ENCOUNTER — HOSPITAL ENCOUNTER (OUTPATIENT)
Dept: PHYSICAL THERAPY | Facility: CLINIC | Age: 72
Setting detail: THERAPIES SERIES
End: 2017-04-26
Attending: ORTHOPAEDIC SURGERY
Payer: MEDICARE

## 2017-04-26 PROCEDURE — 40000718 ZZHC STATISTIC PT DEPARTMENT ORTHO VISIT: Performed by: PHYSICAL THERAPIST

## 2017-04-26 PROCEDURE — 97140 MANUAL THERAPY 1/> REGIONS: CPT | Mod: GP | Performed by: PHYSICAL THERAPIST

## 2017-04-26 PROCEDURE — 97110 THERAPEUTIC EXERCISES: CPT | Mod: GP | Performed by: PHYSICAL THERAPIST

## 2017-05-03 ENCOUNTER — HOSPITAL ENCOUNTER (OUTPATIENT)
Dept: PHYSICAL THERAPY | Facility: CLINIC | Age: 72
Setting detail: THERAPIES SERIES
End: 2017-05-03
Attending: ORTHOPAEDIC SURGERY
Payer: MEDICARE

## 2017-05-03 PROCEDURE — 97140 MANUAL THERAPY 1/> REGIONS: CPT | Mod: GP | Performed by: PHYSICAL THERAPIST

## 2017-05-03 PROCEDURE — 97110 THERAPEUTIC EXERCISES: CPT | Mod: GP | Performed by: PHYSICAL THERAPIST

## 2017-05-03 PROCEDURE — 40000718 ZZHC STATISTIC PT DEPARTMENT ORTHO VISIT: Performed by: PHYSICAL THERAPIST

## 2017-05-03 NOTE — OP NOTE
ADDENDUM - Ruba Vang      SURGICAL ASSISTANT:  KELLEN Cortez MD             D: 2017 17:11   T: 2017 15:54   MT: TS      Name:     RUBA VANG   MRN:      [id number]        Account:        [id number]   :      1945           Procedure Date: 2017      Document: [id number]       cc: [facility]

## 2017-05-09 ENCOUNTER — HOSPITAL ENCOUNTER (OUTPATIENT)
Dept: PHYSICAL THERAPY | Facility: CLINIC | Age: 72
Setting detail: THERAPIES SERIES
End: 2017-05-09
Attending: ORTHOPAEDIC SURGERY
Payer: MEDICARE

## 2017-05-09 PROCEDURE — 40000718 ZZHC STATISTIC PT DEPARTMENT ORTHO VISIT: Performed by: PHYSICAL THERAPIST

## 2017-05-09 PROCEDURE — G8984 CARRY CURRENT STATUS: HCPCS | Mod: GP,CK | Performed by: PHYSICAL THERAPIST

## 2017-05-09 PROCEDURE — 97140 MANUAL THERAPY 1/> REGIONS: CPT | Mod: GP | Performed by: PHYSICAL THERAPIST

## 2017-05-09 PROCEDURE — 97110 THERAPEUTIC EXERCISES: CPT | Mod: GP | Performed by: PHYSICAL THERAPIST

## 2017-05-09 PROCEDURE — G8985 CARRY GOAL STATUS: HCPCS | Mod: GP,CI | Performed by: PHYSICAL THERAPIST

## 2017-05-09 NOTE — PROGRESS NOTES
Outpatient Physical Therapy Progress Note     Patient: Ruba Farmer  : 1945    Beginning/End Dates of Reporting Period:  4/3/17 to 2017    Referring Provider: Corbin Velez PA-C    Therapy Diagnosis: s/p right SAD and rotator cuff repair 3/2/17     Client Self Report: Things are going well.  Still unable to reach overhead, shoulder feels tight.  Less pain at night, able to sleep longer.    Objective Measurements:  Objective Measure: PROM  Details: Kynvcnn=189 deg; ER=50 deg  Objective Measure: AROM  Details: Mftigqc=040 deg with end range shrug sign; ER=40 deg  Objective Measure: SPADI  Details: 45.38% disability; 18% improvement from initial evaluation        Goals:  Goal Identifier     Goal Description Following PT interventions, patient will have >=170 degrees of right GH flexion AROM to allow for normalized overhead ADL's.   Target Date 17   Date Met      Progress:     Goal Identifier     Goal Description Following PT interventions, patient will have >=4+/5 right GH ER strength to reduce impingement with overhead ADL's.   Target Date 17   Date Met      Progress:     Goal Identifier     Goal Description Following PT interventions, patient will be able to type for >=3 hours with <3/10 right GH pain.     Target Date 05/15/17   Date Met  17   Progress:       Progress Toward Goals:   Progress this reporting period: Ruba has made good progress with pain free passive range of motion and postural awareness.  She does present with capsular hypomobility likely secondary to history of adhesive capsulitis.  Additionally she presents with weak posterior rotator cuff and lower trapezius.  Marcy will benefit from continued skilled physical therapy to progress overhead range of motion, strength, and pain reduction.      Plan:  Changes to therapy plan of care: 1x/ every other week    Discharge:  Melani Pak, PT, DPT  Doctor of Physical Therapy #[id number]  [facility]  [facility]  [phone]  [email or web address]

## 2017-05-18 DIAGNOSIS — Z87.440 HISTORY OF URINARY TRACT INFECTION: ICD-10-CM

## 2017-05-18 DIAGNOSIS — R09.82 POST-NASAL DRIP: ICD-10-CM

## 2017-05-18 RX ORDER — SULFAMETHOXAZOLE/TRIMETHOPRIM 800-160 MG
TABLET ORAL
Qty: 30 TABLET | Refills: 1 | Status: SHIPPED | OUTPATIENT
Start: 2017-05-18 | End: 2018-07-06

## 2017-05-18 RX ORDER — FLUTICASONE PROPIONATE 50 MCG
1-2 SPRAY, SUSPENSION (ML) NASAL DAILY
Qty: 16 G | Refills: 0 | Status: SHIPPED | OUTPATIENT
Start: 2017-05-18

## 2017-05-18 NOTE — TELEPHONE ENCOUNTER
Flonase 50 mcg      Last Written Prescription Date: 1/14/16  Last Fill Quantity: 16g,  # refills: 5   Last Office Visit with G, P or [facility] prescribing provider: 4/19/17

## 2017-05-18 NOTE — TELEPHONE ENCOUNTER
bactrim      Last Written Prescription Date:    Last Fill Quantity: ,   # refills:   Last Office Visit with G, P or [facility] prescribing provider: 4/19/2017  Future Office visit:       Routing refill request to provider for review/approval because:  Drug not active on patient's medication list

## 2017-05-24 ENCOUNTER — HOSPITAL ENCOUNTER (OUTPATIENT)
Dept: PHYSICAL THERAPY | Facility: CLINIC | Age: 72
Setting detail: THERAPIES SERIES
End: 2017-05-24
Attending: ORTHOPAEDIC SURGERY
Payer: MEDICARE

## 2017-05-24 PROCEDURE — 40000718 ZZHC STATISTIC PT DEPARTMENT ORTHO VISIT: Performed by: PHYSICAL THERAPIST

## 2017-05-24 PROCEDURE — 97140 MANUAL THERAPY 1/> REGIONS: CPT | Mod: GP | Performed by: PHYSICAL THERAPIST

## 2017-05-24 PROCEDURE — 97110 THERAPEUTIC EXERCISES: CPT | Mod: GP | Performed by: PHYSICAL THERAPIST

## 2017-06-12 ENCOUNTER — HOSPITAL ENCOUNTER (OUTPATIENT)
Dept: PHYSICAL THERAPY | Facility: CLINIC | Age: 72
Setting detail: THERAPIES SERIES
End: 2017-06-12
Attending: ORTHOPAEDIC SURGERY
Payer: MEDICARE

## 2017-06-12 PROCEDURE — 40000718 ZZHC STATISTIC PT DEPARTMENT ORTHO VISIT: Performed by: PHYSICAL THERAPIST

## 2017-06-12 PROCEDURE — G8984 CARRY CURRENT STATUS: HCPCS | Mod: GP,CJ | Performed by: PHYSICAL THERAPIST

## 2017-06-12 PROCEDURE — G8985 CARRY GOAL STATUS: HCPCS | Mod: GP,CI | Performed by: PHYSICAL THERAPIST

## 2017-06-12 PROCEDURE — 97140 MANUAL THERAPY 1/> REGIONS: CPT | Mod: GP | Performed by: PHYSICAL THERAPIST

## 2017-06-12 PROCEDURE — 97110 THERAPEUTIC EXERCISES: CPT | Mod: GP | Performed by: PHYSICAL THERAPIST

## 2017-06-12 NOTE — PROGRESS NOTES
Outpatient Physical Therapy Progress Note     Patient: Ruba Farmer  : 1945    Beginning/End Dates of Reporting Period:  17 to 2017    Referring Provider: Corbin Velez PA-C    Therapy Diagnosis: s/p right SAD and rotator cuff repair 3/2/17     Client Self Report: The shoulder is feeling stronger, but still quite stiff.  Able to sleep on the right side for a few hours.    Objective Measurements:  Objective Measure: PROM  Details: Flexion=160 deg; ER=50 deg  Objective Measure: AROM  Details: Cdvhaby=089 deg; ER=40 deg  Objective Measure: MMT  Details: ER=4/5; Flexion=4/5; IR=5/5          Goals:  Goal Identifier     Goal Description Following PT interventions, patient will have >=170 degrees of right GH flexion AROM to allow for normalized overhead ADL's.   Target Date 17   Date Met      Progress:     Goal Identifier     Goal Description Following PT interventions, patient will have >=4+/5 right GH ER strength to reduce impingement with overhead ADL's.   Target Date 17   Date Met  17   Progress:     Goal Identifier     Goal Description Following PT interventions, patient will be able to type for >=3 hours with <3/10 right GH pain.     Target Date 05/15/17   Date Met  17   Progress:     Progress Toward Goals:   Progress this reporting period: Marcy has made good progress with global glenohumeral strength.  She presents with capsular restrictions limiting both AROM and PROM.  She will benefit from continued skilled physical therapy 1x every other week to progress HEP and address capsular mobility.    Plan:  Discharge from therapy.    Discharge:  Melani Pak, PT, DPT  Doctor of Physical Therapy #[id number]  [facility]  [phone]  [email or web address]

## 2017-06-26 ENCOUNTER — HOSPITAL ENCOUNTER (OUTPATIENT)
Dept: PHYSICAL THERAPY | Facility: CLINIC | Age: 72
Setting detail: THERAPIES SERIES
End: 2017-06-26
Attending: ORTHOPAEDIC SURGERY
Payer: MEDICARE

## 2017-06-26 PROCEDURE — G8985 CARRY GOAL STATUS: HCPCS | Mod: GP,CI | Performed by: PHYSICAL THERAPIST

## 2017-06-26 PROCEDURE — G8984 CARRY CURRENT STATUS: HCPCS | Mod: GP,CJ | Performed by: PHYSICAL THERAPIST

## 2017-06-26 PROCEDURE — 97140 MANUAL THERAPY 1/> REGIONS: CPT | Mod: GP | Performed by: PHYSICAL THERAPIST

## 2017-06-26 PROCEDURE — 40000718 ZZHC STATISTIC PT DEPARTMENT ORTHO VISIT: Performed by: PHYSICAL THERAPIST

## 2017-06-26 NOTE — PROGRESS NOTES
Outpatient Physical Therapy Progress Note     Patient: Ruba Farmer  : 1945    Beginning/End Dates of Reporting Period:  17 to 2017    Referring Provider: Corbin Velez PA-C    Therapy Diagnosis: s/p right SAD and rotator cuff repair 3/2/17     Client Self Report: Shoulder is feeling stronger, but still stiff.  Unable to reach behind back yet.  Additionally having difficulty putting body weight through UE (i.e. for downward dog position in yoga).    Objective Measurements:  Objective Measure: PROM  Details: Flexion=160 deg; ER=55 deg  Objective Measure: AROM  Details: Aapomys=295 deg ER=43 deg; IR pre treatment=lateral hip; IR post treatment=L5  Objective Measure: MMT  Details: ER=4/5; Flexion=4/5; IR=5/5       Goals:  Goal Identifier     Goal Description Following PT interventions, patient will have >=170 degrees of right GH flexion AROM to allow for normalized overhead ADL's.   Target Date 17   Date Met      Progress:     Goal Identifier     Goal Description Following PT interventions, patient will have >=4+/5 right GH ER strength to reduce impingement with overhead ADL's.   Target Date 17   Date Met  17   Progress:     Goal Identifier     Goal Description Following PT interventions, patient will be able to type for >=3 hours with <3/10 right GH pain.     Target Date 05/15/17   Date Met  17   Progress:     Goal Identifier     Goal Description Following PT interventions, patient will be able to reach her top cupboard without difficulty.   Target Date 17   Date Met      Progress:       Progress Toward Goals:   Progress this reporting period: Marcy continues to progress global rotator cuff strength, pain relief, and overhead AROM.  She remains most limited in IR > ER range of motion likely secondary to capsular restrictions.  At this time she is working independently on long duration stretching and progressing endurance.  Anticipate 2-3 final PT visits to progress  range of motion with manual therapy and finalize long term HEP.      Plan:  Changes to therapy plan of care: 1x every other week for 6 weeks    Discharge:  Melani Pak, PT, DPT  Doctor of Physical Therapy #[id number]  [facility]  [phone]  [email or web address]        RECERTIFICATION    Ruba Farmer  1945    Session Number: 10/18 MC since start of care.    Reasons for Continuing Treatment:   Progress capsular mobility; rotator cuff endurance; pain relief    Frequency/Duration  1 times every other week for 6 weeks for a total of 3 visits.    Recertification Period  6/26/17  - 8/11/17    Thank you for this referral.    Physician Signature:    Date:    X_______________________________________________________    Physician Name: Corbin Velez PA-C    I certify the need for these services furnished under this plan of treatment and while under my care. Physician co-signature of this document indicates review and certification of the therapy plan.  This signature may be written on paper, or electronically signed within EPIC.

## 2017-07-10 ENCOUNTER — HOSPITAL ENCOUNTER (OUTPATIENT)
Dept: PHYSICAL THERAPY | Facility: CLINIC | Age: 72
Setting detail: THERAPIES SERIES
End: 2017-07-10
Attending: ORTHOPAEDIC SURGERY
Payer: MEDICARE

## 2017-07-10 PROCEDURE — 97140 MANUAL THERAPY 1/> REGIONS: CPT | Mod: GP | Performed by: PHYSICAL THERAPIST

## 2017-07-10 PROCEDURE — 40000718 ZZHC STATISTIC PT DEPARTMENT ORTHO VISIT: Performed by: PHYSICAL THERAPIST

## 2017-07-10 PROCEDURE — 97110 THERAPEUTIC EXERCISES: CPT | Mod: GP | Performed by: PHYSICAL THERAPIST

## 2017-07-27 ENCOUNTER — OFFICE VISIT (OUTPATIENT)
Dept: FAMILY MEDICINE | Facility: CLINIC | Age: 72
End: 2017-07-27
Payer: COMMERCIAL

## 2017-07-27 VITALS
WEIGHT: 191.8 LBS | HEART RATE: 72 BPM | BODY MASS INDEX: 30.27 KG/M2 | DIASTOLIC BLOOD PRESSURE: 76 MMHG | SYSTOLIC BLOOD PRESSURE: 128 MMHG | TEMPERATURE: 98.2 F

## 2017-07-27 DIAGNOSIS — G89.29 CHRONIC BILATERAL LOW BACK PAIN WITHOUT SCIATICA: ICD-10-CM

## 2017-07-27 DIAGNOSIS — Z11.59 NEED FOR HEPATITIS C SCREENING TEST: Primary | ICD-10-CM

## 2017-07-27 DIAGNOSIS — B36.0 TINEA VERSICOLOR: ICD-10-CM

## 2017-07-27 DIAGNOSIS — Z71.89 ADVANCED DIRECTIVES, COUNSELING/DISCUSSION: ICD-10-CM

## 2017-07-27 DIAGNOSIS — R63.5 WEIGHT GAIN: ICD-10-CM

## 2017-07-27 DIAGNOSIS — M54.50 CHRONIC BILATERAL LOW BACK PAIN WITHOUT SCIATICA: ICD-10-CM

## 2017-07-27 DIAGNOSIS — R09.82 POST-NASAL DRIP: ICD-10-CM

## 2017-07-27 DIAGNOSIS — R35.0 URINARY FREQUENCY: ICD-10-CM

## 2017-07-27 LAB
ALBUMIN UR-MCNC: NEGATIVE MG/DL
APPEARANCE UR: CLEAR
BACTERIA #/AREA URNS HPF: ABNORMAL /HPF
BILIRUB UR QL STRIP: NEGATIVE
COLOR UR AUTO: YELLOW
GLUCOSE UR STRIP-MCNC: NEGATIVE MG/DL
HGB UR QL STRIP: NEGATIVE
KETONES UR STRIP-MCNC: NEGATIVE MG/DL
LEUKOCYTE ESTERASE UR QL STRIP: ABNORMAL
NITRATE UR QL: NEGATIVE
NON-SQ EPI CELLS #/AREA URNS LPF: ABNORMAL /LPF
PH UR STRIP: 7.5 PH (ref 5–7)
RBC #/AREA URNS AUTO: ABNORMAL /HPF (ref 0–2)
SP GR UR STRIP: 1.01 (ref 1–1.03)
URN SPEC COLLECT METH UR: ABNORMAL
UROBILINOGEN UR STRIP-ACNC: 0.2 EU/DL (ref 0.2–1)
WBC #/AREA URNS AUTO: ABNORMAL /HPF (ref 0–2)

## 2017-07-27 PROCEDURE — 81001 URINALYSIS AUTO W/SCOPE: CPT | Performed by: FAMILY MEDICINE

## 2017-07-27 PROCEDURE — 80048 BASIC METABOLIC PNL TOTAL CA: CPT | Performed by: FAMILY MEDICINE

## 2017-07-27 PROCEDURE — 36415 COLL VENOUS BLD VENIPUNCTURE: CPT | Performed by: FAMILY MEDICINE

## 2017-07-27 PROCEDURE — 86803 HEPATITIS C AB TEST: CPT | Performed by: FAMILY MEDICINE

## 2017-07-27 PROCEDURE — 80076 HEPATIC FUNCTION PANEL: CPT | Performed by: FAMILY MEDICINE

## 2017-07-27 PROCEDURE — 99214 OFFICE O/P EST MOD 30 MIN: CPT | Performed by: FAMILY MEDICINE

## 2017-07-27 PROCEDURE — 84443 ASSAY THYROID STIM HORMONE: CPT | Performed by: FAMILY MEDICINE

## 2017-07-27 RX ORDER — IPRATROPIUM BROMIDE 42 UG/1
2 SPRAY, METERED NASAL 3 TIMES DAILY PRN
Qty: 3 BOX | Refills: 5 | Status: SHIPPED | OUTPATIENT
Start: 2017-07-27 | End: 2023-03-01

## 2017-07-27 RX ORDER — ESTRADIOL 0.1 MG/G
2 CREAM VAGINAL
Qty: 42.5 G | Refills: 3 | Status: SHIPPED | OUTPATIENT
Start: 2017-07-27 | End: 2018-07-06

## 2017-07-27 RX ORDER — FLUCONAZOLE 150 MG/1
TABLET ORAL
Qty: 4 TABLET | Refills: 0 | Status: SHIPPED | OUTPATIENT
Start: 2017-07-27 | End: 2018-05-04

## 2017-07-27 RX ORDER — PSEUDOEPHEDRINE HCL 30 MG
30 TABLET ORAL EVERY 4 HOURS PRN
COMMUNITY

## 2017-07-27 NOTE — ASSESSMENT & PLAN NOTE
Advance Care Planning 7/27/2017: ACP Review of Chart / Resources Provided:  Reviewed chart for advance care plan.  Ruba ISHA Farmer has been provided information and resources to begin or update their advance care plan.  Added by Cecile Queen

## 2017-07-27 NOTE — NURSING NOTE
"No chief complaint on file.      Initial /76 (BP Location: Right arm, Patient Position: Chair, Cuff Size: Adult Large)  Pulse 72  Temp 98.2  F (36.8  C) (Tympanic)  Wt 191 lb 12.8 oz (87 kg)  BMI 30.27 kg/m2 Estimated body mass index is 30.27 kg/(m^2) as calculated from the following:    Height as of 2/20/17: 5' 6.75\" (1.695 m).    Weight as of this encounter: 191 lb 12.8 oz (87 kg).  Medication Reconciliation: complete    Health Maintenance that is potentially due pending provider review:  ACT    Possibly completing today per provider review.    Is there anyone who you would like to be able to receive your results? Not Applicable  If yes have patient fill out TUNG      "

## 2017-07-27 NOTE — PROGRESS NOTES
SUBJECTIVE:                                                    Ruba Farmer is a 72 year old female who presents to clinic today for the following health issues:      Rash      Duration: last year, worse since this spring    Description  Location: arms, chest, legs and back  Itching: no    Intensity:  moderate    Accompanying signs and symptoms: None    History (similar episodes/previous evaluation): None    Precipitating or alleviating factors:  New exposures:  None  Recent travel: no      Therapies tried and outcome: none    URINARY TRACT SYMPTOMS      Duration: years    Description  UTI's with intercourse    Intensity:  mild    Accompanying signs and symptoms:  Fever/chills: no   Flank pain no   Nausea and vomiting: no   Vaginal symptoms: none  Abdominal/Pelvic Pain: no     History  History of frequent UTI's: YES  History of kidney stones: no   Sexually Active: YES  Possibility of pregnancy: No    Precipitating or alleviating factors: None    Therapies tried and outcome: doxycycline and bactrim  Outcome: not resolving as well  Vaginal dryness noted           Chronic back pain she would like some exercises for     Weight gain  Despite exercise and diet she continues to not be able to lose weight and is actually gaining some   She has tried everything   She has sig mood issues around this as well       Problem list and histories reviewed & adjusted, as indicated.  Additional history: as documented    Labs reviewed in EPIC    Reviewed and updated as needed this visit by clinical staffTobacco  Allergies  Meds  Problems  Med Hx  Surg Hx  Fam Hx  Soc Hx        Reviewed and updated as needed this visit by Provider  Allergies  Meds  Problems         ROS:  Constitutional, HEENT, cardiovascular, pulmonary, gi and gu systems are negative, except as otherwise noted.      OBJECTIVE:                                                    /76 (BP Location: Right arm, Patient Position: Chair, Cuff Size: Adult  Large)  Pulse 72  Temp 98.2  F (36.8  C) (Tympanic)  Wt 191 lb 12.8 oz (87 kg)  BMI 30.27 kg/m2  Body mass index is 30.27 kg/(m^2).  GENERAL APPEARANCE: healthy, alert and no distress  ABDOMEN: soft, nontender, without hepatosplenomegaly or masses and bowel sounds normal  SKIN: hypopig rash chest  PSYCH: mentation appears normal and affect normal/bright    UA neg     ASSESSMENT/PLAN:                                                    1. Tinea versicolor    - fluconazole (DIFLUCAN) 150 MG tablet; 2 tabs now and then repeat that dose in 14 days  Dispense: 4 tablet; Refill: 0    2. Need for hepatitis C screening test    - Hepatitis C Screen Reflex to HCV RNA Quant and Genotype      3. Weight gain    - TSH with free T4 reflex  - Hepatic panel  - Basic metabolic panel    4. Urinary frequency  Likely this is due to atrophic vaginal changs more than bladder issues   - *UA reflex to Microscopic and Culture ([facility] (except [place] and [place])  - estradiol (ESTRACE VAGINAL) 0.1 MG/GM cream; Place 2 g vaginally twice a week  Dispense: 42.5 g; Refill: 3    5. Chronic bilateral low back pain without sciatica  Long term off and on back pain   - PHYSICAL THERAPY REFERRAL    6. Advanced directives, counseling/discussion        Patient Instructions   Labs today     Leave a urine sample today     Stick with bactrim for urinary symptoms     Try estrogen cream for urinary symptoms     Work with PT for back pain       Xiao Dasilva MD  [facility]

## 2017-07-27 NOTE — MR AVS SNAPSHOT
"              After Visit Summary   7/27/2017    Ruba Farmer    MRN: [id number]           Patient Information     Date Of Birth          1945        Visit Information        Provider Department      7/27/2017 10:20 AM Xiao Dasilva MD [facility]        Today's Diagnoses     Need for hepatitis C screening test    -  1    Weight gain        Tinea versicolor        Urinary frequency        Chronic bilateral low back pain without sciatica          Care Instructions    Labs today     Leave a urine sample today     Stick with bactrim for urinary symptoms     Try estrogen cream for urinary symptoms     Work with PT for back pain           Follow-ups after your visit        Additional Services     PHYSICAL THERAPY REFERRAL       *This therapy referral will be filtered to a centralized scheduling office at [facility] and the patient will receive a call to schedule an appointment at a [facility] location most convenient for them. *     [facility] provides Physical Therapy evaluation and treatment and many specialty services across the [facility] system.  If requesting a specialty program, please choose from the list below.    If you have not heard from the scheduling office within 2 business days, please call [phone] for all locations, with the exception of [place], please call [phone].  Treatment: Evaluation & Treatment  Special Instructions/Modalities:   Special Programs: None    Please be aware that coverage of these services is subject to the terms and limitations of your health insurance plan.  Call member services at your health plan with any benefit or coverage questions.      **Note to Provider:  If you are referring outside of [facility] for the therapy appointment, please list the name of the location in the \"special instructions\" above, print the referral and give to the patient to schedule the appointment.                  Your next " 10 appointments already scheduled     Aug 09, 2017 10:00 AM CDT   Ortho Treatment with Myrna Pak PT   [facility] ([facility])    [address] [place] [id number] [phone]              Who to contact     If you have questions or need follow up information about today's clinic visit or your schedule please contact [facility] directly at [phone].  Normal or non-critical lab and imaging results will be communicated to you by [employer], letter or phone within 4 business days after the clinic has received the results. If you do not hear from us within 7 days, please contact the clinic through [employer] or phone. If you have a critical or abnormal lab result, we will notify you by phone as soon as possible.  Submit refill requests through [employer] or call your pharmacy and they will forward the refill request to us. Please allow 3 business days for your refill to be completed.          Additional Information About Your Visit        [employer] Information     [employer] gives you secure access to your electronic health record. If you see a primary care provider, you can also send messages to your care team and make appointments. If you have questions, please call your primary care clinic.  If you do not have a primary care provider, please call [phone] and they will assist you.        Care EveryWhere ID     This is your Care EveryWhere ID. This could be used by other organizations to access your [facility] medical records  [id number]        Your Vitals Were     Pulse Temperature BMI (Body Mass Index)             72 98.2  F (36.8  C) (Tympanic) 30.27 kg/m2          Blood Pressure from Last 3 Encounters:   07/27/17 128/76   04/19/17 136/74   03/02/17 125/66    Weight from Last 3 Encounters:   07/27/17 191 lb 12.8 oz (87 kg)   04/19/17 194 lb (88 kg)   02/20/17 191 lb (86.6 kg)              We Performed the Following     *UA reflex to Microscopic  and Culture ([facility] (except [place] and [place])     Basic metabolic panel     Hepatic panel     Hepatitis C Screen Reflex to HCV RNA Quant and Genotype     PHYSICAL THERAPY REFERRAL     TSH with free T4 reflex          Today's Medication Changes          These changes are accurate as of: 7/27/17 11:20 AM.  If you have any questions, ask your nurse or doctor.               Start taking these medicines.        Dose/Directions    estradiol 0.1 MG/GM cream   Commonly known as:  ESTRACE VAGINAL   Used for:  Urinary frequency   Started by:  Xiao Dasilva MD        Dose:  2 g   Place 2 g vaginally twice a week   Quantity:  42.5 g   Refills:  3       fluconazole 150 MG tablet   Commonly known as:  DIFLUCAN   Used for:  Tinea versicolor   Started by:  Xiao Dasilva MD        2 tabs now and then repeat that dose in 14 days   Quantity:  4 tablet   Refills:  0         These medicines have changed or have updated prescriptions.        Dose/Directions    SUDAFED 30 MG tablet   This may have changed:  Another medication with the same name was removed. Continue taking this medication, and follow the directions you see here.   Generic drug:  pseudoePHEDrine   Changed by:  Xiao Dasilva MD        Dose:  30 mg   Take 30 mg by mouth every 4 hours as needed for congestion   Refills:  0            Where to get your medicines      These medications were sent to [facility] [place] [id number]     Phone:  [phone]     estradiol 0.1 MG/GM cream    fluconazole 150 MG tablet                Primary Care Provider Office Phone # Fax #    Xiao Dasilva [phone]       [address] [address]        Equal Access to Services     [facility] DEMETRICE AH: Nelsy Grant, dameon herman, le rizo  laalvina arvizu. So [facility] [phone].    ATENCIÓN: Si habla vern, tiene a aguilera disposición servicios gratuitos de asistencia lingüística. Fatou carolina [phone].    We comply with applicable federal civil rights laws and [place] laws. We do not discriminate on the basis of race, color, national origin, age, disability sex, sexual orientation or gender identity.            Thank you!     Thank you for choosing [facility]  for your care. Our goal is always to provide you with excellent care. Hearing back from our patients is one way we can continue to improve our services. Please take a few minutes to complete the written survey that you may receive in the mail after your visit with us. Thank you!             Your Updated Medication List - Protect others around you: Learn how to safely use, store and throw away your medicines at www.disposemymeds.org.          This list is accurate as of: 7/27/17 11:20 AM.  Always use your most recent med list.                   Brand Name Dispense Instructions for use Diagnosis    albuterol 108 (90 BASE) MCG/ACT Inhaler    PROAIR HFA/PROVENTIL HFA/VENTOLIN HFA    1 Inhaler    Inhale 2 puffs into the lungs every 6 hours as needed for shortness of breath / dyspnea    Chronic cough       ALLEGRA ALLERGY PO      Take by mouth daily        COCONUT OIL PO      Take 2 capsules by mouth daily        COQ10 PO      Take 1 capsule by mouth daily        Digestive Enzyme Caps           doxycycline 100 MG capsule    VIBRAMYCIN    28 capsule    Take 1 capsule (100 mg) by mouth 2 times daily    Mild persistent asthma without complication       estradiol 0.1 MG/GM cream    ESTRACE VAGINAL    42.5 g    Place 2 g vaginally twice a week    Urinary frequency       fluconazole 150 MG tablet    DIFLUCAN    4 tablet    2 tabs now and then repeat that dose in 14 days    Tinea versicolor       fluticasone 220 MCG/ACT Inhaler    FLOVENT HFA    1 Inhaler    Inhale 2 puffs into the lungs 2 times  daily Rinse out mouth after use.    Intermittent asthma, with acute exacerbation       fluticasone 50 MCG/ACT spray    FLONASE    16 g    Spray 1-2 sprays into both nostrils daily    Post-nasal drip       ibuprofen 200 MG tablet    ADVIL/MOTRIN    120    TAKE 1 TO 3 TABLETS EVERY 6 HOURS AS NEEDED WITH FOOD for 7-14 days    Strain of lumbar region       ipratropium 0.06 % spray    ATROVENT    3 Box    Spray 2 sprays into both nostrils 3 times daily as needed for rhinitis    Post-nasal drip       LORazepam 0.5 MG tablet    ATIVAN    20 tablet    Take 1 tablet (0.5 mg) by mouth every 8 hours as needed for anxiety    Anxiety       OVER-THE-COUNTER      D-monos takes after SIC to prevent UTIs        POTASSIUM CITRATE PO      Take 1 tablet by mouth daily        PROBIOTIC DAILY PO      Take 1 tablet by mouth daily        SUDAFED 30 MG tablet   Generic drug:  pseudoePHEDrine      Take 30 mg by mouth every 4 hours as needed for congestion        sulfamethoxazole-trimethoprim 800-160 MG per tablet    BACTRIM DS/SEPTRA DS    30 tablet    TAKE ONE TABLET BY MOUTH AFTER INTERCOURSE    History of urinary tract infection       Vitamin C 500 MG Caps      Take 500 mg by mouth daily        vitamin D 2000 UNITS Caps      Take 2,000 Units by mouth daily

## 2017-07-27 NOTE — TELEPHONE ENCOUNTER
Atrovent 0.06      Last Written Prescription Date: 1/4/16  Last Fill Quantity: 3,  # refills: 5   Last Office Visit with [facility], [facility] or [facility] prescribing provider: 07/27/17    Thank You!  Heaven Okeefe  [facility]  P: [phone] F:[phone]

## 2017-07-27 NOTE — PATIENT INSTRUCTIONS
Labs today     Leave a urine sample today     Stick with bactrim for urinary symptoms     Try estrogen cream for urinary symptoms     Work with PT for back pain

## 2017-07-28 LAB
ALBUMIN SERPL-MCNC: 4 G/DL (ref 3.4–5)
ALP SERPL-CCNC: 60 U/L (ref 40–150)
ALT SERPL W P-5'-P-CCNC: 55 U/L (ref 0–50)
ANION GAP SERPL CALCULATED.3IONS-SCNC: 4 MMOL/L (ref 3–14)
AST SERPL W P-5'-P-CCNC: 41 U/L (ref 0–45)
BILIRUB DIRECT SERPL-MCNC: 0.1 MG/DL (ref 0–0.2)
BILIRUB SERPL-MCNC: 0.5 MG/DL (ref 0.2–1.3)
BUN SERPL-MCNC: 10 MG/DL (ref 7–30)
CALCIUM SERPL-MCNC: 9.4 MG/DL (ref 8.5–10.1)
CHLORIDE SERPL-SCNC: 105 MMOL/L (ref 94–109)
CO2 SERPL-SCNC: 30 MMOL/L (ref 20–32)
CREAT SERPL-MCNC: 0.66 MG/DL (ref 0.52–1.04)
GFR SERPL CREATININE-BSD FRML MDRD: 89 ML/MIN/1.7M2
GLUCOSE SERPL-MCNC: 94 MG/DL (ref 70–99)
HCV AB SERPL QL IA: NORMAL
POTASSIUM SERPL-SCNC: 4.3 MMOL/L (ref 3.4–5.3)
PROT SERPL-MCNC: 7.3 G/DL (ref 6.8–8.8)
SODIUM SERPL-SCNC: 139 MMOL/L (ref 133–144)
TSH SERPL DL<=0.005 MIU/L-ACNC: 2.54 MU/L (ref 0.4–4)

## 2017-07-28 ASSESSMENT — ASTHMA QUESTIONNAIRES: ACT_TOTALSCORE: 21

## 2017-08-14 ENCOUNTER — HOSPITAL ENCOUNTER (OUTPATIENT)
Dept: PHYSICAL THERAPY | Facility: CLINIC | Age: 72
Setting detail: THERAPIES SERIES
End: 2017-08-14
Attending: FAMILY MEDICINE
Payer: MEDICARE

## 2017-08-14 ENCOUNTER — HOSPITAL ENCOUNTER (OUTPATIENT)
Dept: PHYSICAL THERAPY | Facility: CLINIC | Age: 72
Setting detail: THERAPIES SERIES
End: 2017-08-14
Attending: ORTHOPAEDIC SURGERY
Payer: MEDICARE

## 2017-08-14 PROCEDURE — 97161 PT EVAL LOW COMPLEX 20 MIN: CPT | Mod: GP | Performed by: PHYSICAL THERAPIST

## 2017-08-14 PROCEDURE — G8985 CARRY GOAL STATUS: HCPCS | Mod: GP,CI | Performed by: PHYSICAL THERAPIST

## 2017-08-14 PROCEDURE — G8982 BODY POS GOAL STATUS: HCPCS | Mod: GP,CI | Performed by: PHYSICAL THERAPIST

## 2017-08-14 PROCEDURE — 97110 THERAPEUTIC EXERCISES: CPT | Mod: GP | Performed by: PHYSICAL THERAPIST

## 2017-08-14 PROCEDURE — G8981 BODY POS CURRENT STATUS: HCPCS | Mod: GP,CJ | Performed by: PHYSICAL THERAPIST

## 2017-08-14 PROCEDURE — 97140 MANUAL THERAPY 1/> REGIONS: CPT | Mod: GP | Performed by: PHYSICAL THERAPIST

## 2017-08-14 PROCEDURE — G8983 BODY POS D/C STATUS: HCPCS | Mod: GP,CJ | Performed by: PHYSICAL THERAPIST

## 2017-08-14 PROCEDURE — G8986 CARRY D/C STATUS: HCPCS | Mod: GP,CI | Performed by: PHYSICAL THERAPIST

## 2017-08-14 PROCEDURE — 40000718 ZZHC STATISTIC PT DEPARTMENT ORTHO VISIT: Performed by: PHYSICAL THERAPIST

## 2017-08-14 NOTE — PROGRESS NOTES
08/14/17 1400   General Information   Type of Visit Initial OP Ortho PT Evaluation   Start of Care Date 08/14/17   Referring Physician Xiao Dasilva MD   Patient/Family Goals Statement To find out what is wrong in her back, to review the exercises   Orders Evaluate and Treat   Date of Order 07/27/17   Insurance Type Medicare;Other   Insurance Comments/Visits Authorized Medicare; [employer] ($1184 remaining in PT/SLP cap; no iontophoresis)   Medical Diagnosis Chronic bilateral low back pain without sciatica   Surgical/Medical history reviewed Yes   Precautions/Limitations no known precautions/limitations       Present No   Body Part(s)   Body Part(s) Lumbar Spine/SI   Presentation and Etiology   Pertinent history of current problem (include personal factors and/or comorbidities that impact the POC) Patient reports: her left low back pain flared up in the past few weeks for no apparent reason.  Feels the pain after taking the first initial steps after sitting in her truck/waking up in the AM.     Impairments A. Pain;E. Decreased flexibility   Functional Limitations perform activities of daily living;perform required work activities;perform desired leisure / sports activities   Symptom Location Left lateral lumbar region (QL); at times pinpoint left L4-L5   How/Where did it occur From insidious onset;From Degenerative Joint Disease   Onset date of current episode/exacerbation 06/19/17  (Ongoing; worsening lately)   Chronicity Recurrent   Pain rating (0-10 point scale) Best (/10);Worst (/10)   Best (/10) 2   Worst (/10) 7   Pain quality A. Sharp;F. Stabbing   Frequency of pain/symptoms B. Intermittent   Pain/symptoms are: Worse in the morning   Pain/symptoms exacerbated by A. Sitting;M. Other;G. Certain positions;I. Bending   Pain exacerbation comment Riding in car   Pain/symptoms eased by E. Changing positions   Progression of symptoms since onset: Worsened   Current / Previous Interventions    Diagnostic Tests: Other   Other diagnostic tests DXA scan 4/4/17: 1. The T-score of the lumbar spine in the region of L1-L4 is 0.7 with a Z-score of 2.4. This suggests increased bone mineral density in this area for the patient's age. This most commonly relates to artefactual elevation of bone mineral density due to underlying degenerative change.   2. The T-score of the right femoral neck is -1.7. This correlates with moderate osteopenia.  3. The T-score of the left femoral neck is -1.2. This correlates with mild osteopenia.  4.  The bone mineral density of the worst hip is 0.808 gm/cm2.   Prior Level of Function   Prior Level of Function-Mobility Independent   Prior Level of Function-ADLs Independent   Current Level of Function   Current Community Support Family/friend caregiver   Patient role/employment history F. Retired   Living environment [place]   Fall Risk Screen   Fall screen completed by PT   Per patient - Fall 2 or more times in past year? No   Per patient - Fall with injury in past year? No   Is patient a fall risk? No   System Outcome Measures   Outcome Measures Low Back Pain (see [place] and [place])   Lumbar Spine/SI Objective Findings   Flexion ROM 50% fingertips to mid shins, increased left LBP   Extension ROM 50% increased left L4-L5 pain    Right Side Bending ROM 75% stretch left   Left Side Bending ROM 50% increased L4-L5 left pain   Hip Flexion (L2) Strength Jovani=5/5   Hip Abduction Strength Right=4/5; Left=3+/5   Hip Extension Strength Jovani=4/5   Knee Flexion Strength Jovani=5/5   Knee Extension (L3) Strength Jovani=5/5   Ankle Dorsiflexion (L4) Strength Jovani=5/5   Great Toe Extension (L5) Strength Jovani=5/5   Ankle Plantar Flexion (S1) Strength Jovani=5/5   SLR Right=70 deg; Left=65 deg with increased lumbar sx   Palpation Hypertonic left quadratus lumborum; paraspinals T12-S1   Planned Therapy Interventions   Planned Therapy Interventions ADL retraining;balance training;gait training;joint  mobilization;manual therapy;motor coordination training;neuromuscular re-education;ROM;strengthening;stretching   Planned Modality Interventions   Planned Modality Interventions Cryotherapy   Clinical Impression   Criteria for Skilled Therapeutic Interventions Met yes, treatment indicated   PT Diagnosis Chronic-recurrent low back pain related to weak hip abductors and core, hypertonic paraspinals and QL, and impaired posture   Influenced by the following impairments Pain; weakness; impaired ROM; impaired posture   Functional limitations due to impairments Pain and difficulty with AM routine, taking first few steps out of truck   Clinical Presentation Stable/Uncomplicated   Clinical Presentation Rationale (+) motivation; previous success with PT; desire to perform HEP independently   (-) chronicity   Therapy Frequency other (see comments)  (Chart to remain open x10 weeks; no formal visits planned)   Predicted Duration of Therapy Intervention (days/wks) 10 weeks   Risk & Benefits of therapy have been explained Yes   Patient, Family & other staff in agreement with plan of care Yes   Clinical Impression Comments Marcy is a pleasant 71 yo female who presents today with chronic recurrent left lumbar pain.   She presents with muscle imbalances and postural dysfunction; she will benefit from skilled PT to address these.   Education Assessment   Preferred Learning Style Listening;Demonstration;Pictures/video   Barriers to Learning No barriers   ORTHO GOALS   PT Ortho Eval Goals 1;2;3   Ortho Goal 1   Goal Description Following PT interventions, patient will have >+5-/5 bilateral hip abduction strength to support the lumbar spine with ADL's.   Target Date 10/23/17   Ortho Goal 2   Goal Description Following PT interventions, patient will be independent with her HEP to reduce future occurrence of pain and disability.   Target Date 08/14/17   Date Met 08/14/17   Ortho Goal 3   Goal Description Following PT interventions,  patient will be able to walk in the morning without low back pain.   Target Date 10/23/17   Total Evaluation Time   Total Evaluation Time 15 min   Therapy Certification   Certification date from 08/14/17   Certification date to 10/23/17   Medical Diagnosis Chronic bilateral low back pain without sciatica       Myrna Pak, PT, DPT  Doctor of Physical Therapy #[id number]  [facility]  [phone]  [email or web address]

## 2017-08-14 NOTE — PROGRESS NOTES
[facility]          OUTPATIENT PHYSICAL THERAPY ORTHOPEDIC EVALUATION  PLAN OF TREATMENT FOR OUTPATIENT REHABILITATION  (COMPLETE FOR INITIAL CLAIMS ONLY)  Patient's Last Name, First Name, M.I.  YOB: 1945  Ruba Farmer    Provider s Name:  [facility]   Medical Record No.  [id number]   Start of Care Date:  08/14/17   Onset Date:  06/19/17 (Ongoing; worsening lately)   Type:     _X__PT   ___OT   ___SLP Medical Diagnosis:  Chronic bilateral low back pain without sciatica     PT Diagnosis:  Chronic-recurrent low back pain related to weak hip abductors and core, hypertonic paraspinals and QL, and impaired posture   Visits from SOC:  1      _________________________________________________________________________________  Plan of Treatment/Functional Goals:  ADL retraining, balance training, gait training, joint mobilization, manual therapy, motor coordination training, neuromuscular re-education, ROM, strengthening, stretching     Cryotherapy     Goals     Goal Description: Following PT interventions, patient will have >+5-/5 bilateral hip abduction strength to support the lumbar spine with ADL's.  Target Date: 10/23/17       Goal Description: Following PT interventions, patient will be independent with her HEP to reduce future occurrence of pain and disability.  Target Date: 08/14/17       Goal Description: Following PT interventions, patient will be able to walk in the morning without low back pain.  Target Date: 10/23/17              Therapy Frequency:  other (see comments) (Chart to remain open x10 weeks; no formal visits planned)  Predicted Duration of Therapy Intervention:  10 weeks    Myrna Pak, PT                 I CERTIFY THE NEED FOR THESE SERVICES FURNISHED UNDER        THIS PLAN OF TREATMENT AND WHILE UNDER MY CARE     (Physician co-signature of this document indicates review and certification of the therapy plan).                          Certification Date From:  08/14/17   Certification Date To:  10/23/17    Referring Provider:  Xiao Dasilva MD    Initial Assessment        See Epic Evaluation Start of Care Date: 08/14/17

## 2017-11-01 DIAGNOSIS — J45.30 MILD PERSISTENT ASTHMA WITHOUT COMPLICATION: ICD-10-CM

## 2017-11-01 DIAGNOSIS — F41.9 ANXIETY: ICD-10-CM

## 2017-11-01 NOTE — TELEPHONE ENCOUNTER
Ativan 0.5 mg tab      Last Written Prescription Date:  11/28/16  Last Fill Quantity: 20,   # refills: 2  Future Office visit:       Routing refill request to provider for review/approval because:  Drug not on the [facility], [facility] or [facility] refill protocol or controlled substance

## 2017-11-01 NOTE — TELEPHONE ENCOUNTER
doxycycline (VIBRAMYCIN) 100 MG capsule      Last Written Prescription Date: 4/7/17  Last Fill Quantity: 28,  # refills: 0

## 2017-11-03 RX ORDER — LORAZEPAM 0.5 MG/1
0.5 TABLET ORAL EVERY 8 HOURS PRN
Qty: 20 TABLET | Refills: 0 | Status: SHIPPED | OUTPATIENT
Start: 2017-11-03 | End: 2020-03-20

## 2017-11-03 RX ORDER — DOXYCYCLINE HYCLATE 100 MG
TABLET ORAL
Qty: 28 TABLET | Refills: 0 | Status: SHIPPED | OUTPATIENT
Start: 2017-11-03 | End: 2018-05-04

## 2017-11-07 ENCOUNTER — ALLIED HEALTH/NURSE VISIT (OUTPATIENT)
Dept: FAMILY MEDICINE | Facility: CLINIC | Age: 72
End: 2017-11-07
Payer: COMMERCIAL

## 2017-11-07 DIAGNOSIS — Z23 NEED FOR TDAP VACCINATION: ICD-10-CM

## 2017-11-07 DIAGNOSIS — Z23 NEED FOR PROPHYLACTIC VACCINATION AND INOCULATION AGAINST INFLUENZA: Primary | ICD-10-CM

## 2017-11-07 PROCEDURE — 90662 IIV NO PRSV INCREASED AG IM: CPT

## 2017-11-07 PROCEDURE — G0009 ADMIN PNEUMOCOCCAL VACCINE: HCPCS

## 2017-11-07 PROCEDURE — 99207 ZZC NO CHARGE NURSE ONLY: CPT

## 2017-11-07 PROCEDURE — 90472 IMMUNIZATION ADMIN EACH ADD: CPT

## 2017-11-07 PROCEDURE — 90715 TDAP VACCINE 7 YRS/> IM: CPT

## 2017-11-07 PROCEDURE — 90732 PPSV23 VACC 2 YRS+ SUBQ/IM: CPT

## 2017-11-07 PROCEDURE — G0008 ADMIN INFLUENZA VIRUS VAC: HCPCS

## 2017-11-07 NOTE — PROGRESS NOTES

## 2017-11-07 NOTE — MR AVS SNAPSHOT
After Visit Summary   11/7/2017    Ruba Farmer    MRN: [id number]           Patient Information     Date Of Birth          1945        Visit Information        Provider Department      11/7/2017 10:00 AM [place] NB KHUSHBOO/LPN [facility]        Today's Diagnoses     Need for prophylactic vaccination and inoculation against influenza    -  1    Need for Tdap vaccination           Follow-ups after your visit        Who to contact     If you have questions or need follow up information about today's clinic visit or your schedule please contact [facility] directly at [phone].  Normal or non-critical lab and imaging results will be communicated to you by [employer], letter or phone within 4 business days after the clinic has received the results. If you do not hear from us within 7 days, please contact the clinic through [employer] or phone. If you have a critical or abnormal lab result, we will notify you by phone as soon as possible.  Submit refill requests through [employer] or call your pharmacy and they will forward the refill request to us. Please allow 3 business days for your refill to be completed.          Additional Information About Your Visit        MyChart Information     [employer] gives you secure access to your electronic health record. If you see a primary care provider, you can also send messages to your care team and make appointments. If you have questions, please call your primary care clinic.  If you do not have a primary care provider, please call [phone] and they will assist you.        Care EveryWhere ID     This is your Care EveryWhere ID. This could be used by other organizations to access your [place] medical records  [id number]         Blood Pressure from Last 3 Encounters:   07/27/17 128/76   04/19/17 136/74   03/02/17 125/66    Weight from Last 3 Encounters:   07/27/17 191 lb 12.8 oz (87 kg)   04/19/17 194 lb (88 kg)   02/20/17 191 lb  (86.6 kg)              We Performed the Following     Asthma Action Plan (AAP)     FLU VACCINE, INCREASED ANTIGEN, PRESV FREE, AGE 65+ [68070]     Pneumococcal vaccine 23 valent PPSV23  (Pneumovax) [50763]     TDAP VACCINE (ADACEL)     Vaccine Administration, Each Additional [69218]     Vaccine Administration, Initial [26592]        Primary Care Provider Office Phone # Fax #    Xiao Dasilva [phone] [address] [place] [id number]        Equal Access to Services     [facility] Center[facility]NONIE : Hadii aad ku hadasho Soomaali, waaxda luqadaha, qaybta kaalmada adeegyada, le cordova . So [facility] [phone].    ATENCIÓN: Si habla español, tiene a aguilera disposición servicios gratuitos de asistencia lingüística. Llame al [phone].    We comply with applicable federal civil rights laws and [place] laws. We do not discriminate on the basis of race, color, national origin, age, disability, sex, sexual orientation, or gender identity.            Thank you!     Thank you for choosing [facility]  for your care. Our goal is always to provide you with excellent care. Hearing back from our patients is one way we can continue to improve our services. Please take a few minutes to complete the written survey that you may receive in the mail after your visit with us. Thank you!             Your Updated Medication List - Protect others around you: Learn how to safely use, store and throw away your medicines at www.disposemymeds.org.          This list is accurate as of: 11/7/17 10:23 AM.  Always use your most recent med list.                   Brand Name Dispense Instructions for use Diagnosis    albuterol 108 (90 BASE) MCG/ACT Inhaler    PROAIR HFA/PROVENTIL HFA/VENTOLIN HFA    1 Inhaler    Inhale 2 puffs into the lungs every 6 hours as needed for shortness of breath / dyspnea    Chronic cough       ALLEGRA ALLERGY PO      Take by mouth daily        COCONUT  OIL PO      Take 2 capsules by mouth daily        COQ10 PO      Take 1 capsule by mouth daily        Digestive Enzyme Caps           doxycycline 100 MG tablet    VIBRA-TABS    28 tablet    TAKE ONE TABLET BY MOUTH TWICE A DAY    Mild persistent asthma without complication       estradiol 0.1 MG/GM cream    ESTRACE VAGINAL    42.5 g    Place 2 g vaginally twice a week    Urinary frequency       fluconazole 150 MG tablet    DIFLUCAN    4 tablet    2 tabs now and then repeat that dose in 14 days    Tinea versicolor       fluticasone 220 MCG/ACT Inhaler    FLOVENT HFA    1 Inhaler    Inhale 2 puffs into the lungs 2 times daily Rinse out mouth after use.    Intermittent asthma, with acute exacerbation       fluticasone 50 MCG/ACT spray    FLONASE    16 g    Spray 1-2 sprays into both nostrils daily    Post-nasal drip       ibuprofen 200 MG tablet    ADVIL/MOTRIN    120    TAKE 1 TO 3 TABLETS EVERY 6 HOURS AS NEEDED WITH FOOD for 7-14 days    Strain of lumbar region       ipratropium 0.06 % spray    ATROVENT    3 Box    Spray 2 sprays into both nostrils 3 times daily as needed for rhinitis    Post-nasal drip       LORazepam 0.5 MG tablet    ATIVAN    20 tablet    Take 1 tablet (0.5 mg) by mouth every 8 hours as needed for anxiety    Anxiety       OVER-THE-COUNTER      D-monos takes after SIC to prevent UTIs        POTASSIUM CITRATE PO      Take 1 tablet by mouth daily        PROBIOTIC DAILY PO      Take 1 tablet by mouth daily        SUDAFED 30 MG tablet   Generic drug:  pseudoePHEDrine      Take 30 mg by mouth every 4 hours as needed for congestion        sulfamethoxazole-trimethoprim 800-160 MG per tablet    BACTRIM DS/SEPTRA DS    30 tablet    TAKE ONE TABLET BY MOUTH AFTER INTERCOURSE    History of urinary tract infection       Vitamin C 500 MG Caps      Take 500 mg by mouth daily        vitamin D 2000 UNITS Caps      Take 2,000 Units by mouth daily

## 2017-11-07 NOTE — NURSING NOTE
Prior to injection verified patient identity using patient's name and date of birth.    Screening Questionnaire for Adult Immunization    Are you sick today?   Yes   Do you have allergies to medications, food, a vaccine component or latex?   No   Have you ever had a serious reaction after receiving a vaccination?   No   Do you have a long-term health problem with heart disease, lung disease, asthma, kidney disease, metabolic disease (e.g. diabetes), anemia, or other blood disorder?   No   Do you have cancer, leukemia, HIV/AIDS, or any other immune system problem?   No   In the past 3 months, have you taken medications that affect  your immune system, such as prednisone, other steroids, or anticancer drugs; drugs for the treatment of rheumatoid arthritis, Crohn s disease, or psoriasis; or have you had radiation treatments?   No   Have you had a seizure, or a brain or other nervous system problem?   No   During the past year, have you received a transfusion of blood or blood     products, or been given immune (gamma) globulin or antiviral drug?   No   For women: Are you pregnant or is there a chance you could become        pregnant during the next month?   No   Have you received any vaccinations in the past 4 weeks?   No     Immunization questionnaire answers were all negative.        Per orders of Dr. Dasilva, injection of Tdap and Pneumovax given by Yesi Berry. Patient instructed to remain in clinic for 15 minutes afterwards, and to report any adverse reaction to me immediately.       Screening performed by Yesi Berry on 11/7/2017 at 10:23 AM.

## 2017-11-07 NOTE — LETTER
My Asthma Action Plan  Name: Ruba Farmer   YOB: 1945  Date: 11/7/2017   My doctor: GAGE CUELLO/LPN   My clinic: [facility]        My Control Medicine: Fluticasone propionate (Flovent) -   mcg .  My Rescue Medicine: Albuterol (Proair/Ventolin/Proventil) inhaler .   My Asthma Severity: moderate persistent  Avoid your asthma triggers: Patient is unaware of triggers               GREEN ZONE   Good Control    I feel good    No cough or wheeze    Can work, sleep and play without asthma symptoms       Take your asthma control medicine every day.     1. If exercise triggers your asthma, take your rescue medication    15 minutes before exercise or sports, and    During exercise if you have asthma symptoms  2. Spacer to use with inhaler: If you have a spacer, make sure to use it with your inhaler             YELLOW ZONE Getting Worse  I have ANY of these:    I do not feel good    Cough or wheeze    Chest feels tight    Wake up at night   1. Keep taking your Green Zone medications  2. Start taking your rescue medicine:    every 20 minutes for up to 1 hour. Then every 4 hours for 24-48 hours.  3. If you stay in the Yellow Zone for more than 12-24 hours, contact your doctor.  4. If you do not return to the Green Zone in 12-24 hours or you get worse, start taking your oral steroid medicine if prescribed by your provider.           RED ZONE Medical Alert - Get Help  I have ANY of these:    I feel awful    Medicine is not helping    Breathing getting harder    Trouble walking or talking    Nose opens wide to breathe       1. Take your rescue medicine NOW  2. If your provider has prescribed an oral steroid medicine, start taking it NOW  3. Call your doctor NOW  4. If you are still in the Red Zone after 20 minutes and you have not reached your doctor:    Take your rescue medicine again and    Call 911 or go to the emergency room right away    See your regular doctor within 2 weeks of an  Emergency Room or Urgent Care visit for follow-up treatment.        Electronically signed by: Yesi Berry, November 7, 2017    Annual Reminders:  Meet with Asthma Educator,  Flu Shot in the Fall, consider Pneumonia Vaccination for patients with asthma (aged 19 and older).    Pharmacy:    [facility], [place] - [address] [place] [address]                    Asthma Triggers  How To Control Things That Make Your Asthma Worse    Triggers are things that make your asthma worse.  Look at the list below to help you find your triggers and what you can do about them.  You can help prevent asthma flare-ups by staying away from your triggers.      Trigger                                                          What you can do   Cigarette Smoke  Tobacco smoke can make asthma worse. Do not allow smoking in your home, car or around you.  Be sure no one smokes at a child s day care or school.  If you smoke, ask your health care provider for ways to help you quit.  Ask family members to quit too.  Ask your health care provider for a referral to Quit Plan to help you quit smoking, or call 0-280-645-PLAN.     Colds, Flu, Bronchitis  These are common triggers of asthma. Wash your hands often.  Don t touch your eyes, nose or mouth.  Get a flu shot every year.     Dust Mites  These are tiny bugs that live in cloth or carpet. They are too small to see. Wash sheets and blankets in hot water every week.   Encase pillows and mattress in dust mite proof covers.  Avoid having carpet if you can. If you have carpet, vacuum weekly.   Use a dust mask and HEPA vacuum.   Pollen and Outdoor Mold  Some people are allergic to trees, grass, or weed pollen, or molds. Try to keep your windows closed.  Limit time out doors when pollen count is high.   Ask you health care provider about taking medicine during allergy season.     Animal Dander  Some people are allergic to skin flakes, urine or saliva from pets  with fur or feathers. Keep pets with fur or feathers out of your home.    If you can t keep the pet outdoors, then keep the pet out of your bedroom.  Keep the bedroom door closed.  Keep pets off cloth furniture and away from stuffed toys.     Mice, Rats, and Cockroaches  Some people are allergic to the waste from these pests.   Cover food and garbage.  Clean up spills and food crumbs.  Store grease in the refrigerator.   Keep food out of the bedroom.   Indoor Mold  This can be a trigger if your home has high moisture. Fix leaking faucets, pipes, or other sources of water.   Clean moldy surfaces.  Dehumidify basement if it is damp and smelly.   Smoke, Strong Odors, and Sprays  These can reduce air quality. Stay away from strong odors and sprays, such as perfume, powder, hair spray, paints, smoke incense, paint, cleaning products, candles and new carpet.   Exercise or Sports  Some people with asthma have this trigger. Be active!  Ask your doctor about taking medicine before sports or exercise to prevent symptoms.    Warm up for 5-10 minutes before and after sports or exercise.     Other Triggers of Asthma  Cold air:  Cover your nose and mouth with a scarf.  Sometimes laughing or crying can be a trigger.  Some medicines and food can trigger asthma.

## 2017-12-12 NOTE — PROGRESS NOTES
Outpatient Physical Therapy Discharge Note     Patient: Ruba Farmer  : 1945    Beginning/End Dates of Reporting Period:  17 to 2017    Referring Provider: Xiao Dasilva MD    Therapy Diagnosis: Chronic-recurrent low back pain related to weak hip abductors and core, hypertonic paraspinals and QL, and impaired posture     Client Self Report: Patient reports: her left low back pain flared up in the past few weeks for no apparent reason.  Feels the pain after taking the first initial steps after sitting in her truck/waking up in the AM.      Objective Measurements:  Objective Measure: JUANY  Details: 8% minimal disability  Objective Measure: Kehinde  Details: 1, 3 low risk            Goals:  Goal Identifier     Goal Description Following PT interventions, patient will have >+5-/5 bilateral hip abduction strength to support the lumbar spine with ADL's.   Target Date 10/23/17   Date Met      Progress:     Goal Identifier     Goal Description Following PT interventions, patient will be independent with her HEP to reduce future occurrence of pain and disability.   Target Date 17   Date Met  17   Progress:     Goal Identifier     Goal Description Following PT interventions, patient will be able to walk in the morning without low back pain.   Target Date 10/23/17   Date Met      Progress:     Progress Toward Goals:   Progress this reporting period: Marcy attended 1 PT session to address her low back pain.  She was educated in a long term HEP to address pain and ROM restrictions.  At this time she is independently working with her long term HEP to address final goals.            Plan:  Discharge from therapy.    Discharge:    Reason for Discharge: Patient is independently working to achieve goals    Equipment Issued: Theraband    Discharge Plan: Patient to continue home program.      Myrna Nascimento, PT, DPT  Doctor of Physical Therapy #[id number]  [facility]  [facility]  [phone]  [email or web address]

## 2017-12-12 NOTE — PROGRESS NOTES
Outpatient Physical Therapy Discharge Note     Patient: Ruba Farmer  : 1945    Beginning/End Dates of Reporting Period:  17 to 2017    Referring Provider: Corbin Velez PA-C    Therapy Diagnosis: s/p right SAD and rotator cuff repair 3/2/17     Client Self Report: Shoulder is really doing well.  Having trouble with weightbearing (yoga) and end range extension (reaching behind in the car).  Otherwise- pain free with sleeping on it.  It is getting stronger.    Objective Measurements:  Objective Measure: PROM  Details: Hhxrdji=770 deg; ER=60 deg  Objective Measure: AROM  Details: Bcugabp=590 deg; ER=55 deg; IR=T10  Objective Measure: MMT  Details: ER=4/5; Flexion=4/5; IR=5/5         Goals:  Goal Identifier     Goal Description Following PT interventions, patient will have >=170 degrees of right GH flexion AROM to allow for normalized overhead ADL's.   Target Date 17   Date Met      Progress:     Goal Identifier     Goal Description Following PT interventions, patient will have >=4+/5 right GH ER strength to reduce impingement with overhead ADL's.   Target Date 17   Date Met  17   Progress:     Goal Identifier     Goal Description Following PT interventions, patient will be able to type for >=3 hours with <3/10 right GH pain.     Target Date 05/15/17   Date Met  17   Progress:     Goal Identifier     Goal Description Following PT interventions, patient will be able to reach her top cupboard without difficulty.   Target Date 17   Date Met      Progress:     Progress Toward Goals:   Progress this reporting period: Marcy attended 12 PT sessions s/p her right rotator cuff surgery.  She made good progress with pain free AROM and rotator cuff strength.  She was able to return to modified yoga and ADLs with minimal difficulty.  At this time, patient is independent with her long term HEP.        Plan:  Discharge from therapy.    Discharge:    Reason for Discharge: Patient has  met 2 of 4 PT goals and is working independently towards achieving final goals    Equipment Issued: Theraband    Discharge Plan: Patient to continue home program.        Myrna Nascimento, PT, DPT  Doctor of Physical Therapy #[id number]  [facility]  [phone]  [email or web address]

## 2017-12-18 ENCOUNTER — MYC MEDICAL ADVICE (OUTPATIENT)
Dept: FAMILY MEDICINE | Facility: CLINIC | Age: 72
End: 2017-12-18

## 2017-12-18 DIAGNOSIS — J20.9 ACUTE BRONCHITIS, UNSPECIFIED ORGANISM: Primary | ICD-10-CM

## 2017-12-18 RX ORDER — AZITHROMYCIN 250 MG/1
TABLET, FILM COATED ORAL
Qty: 6 TABLET | Refills: 0 | Status: SHIPPED | OUTPATIENT
Start: 2017-12-18 | End: 2018-05-04

## 2018-01-16 ENCOUNTER — MYC MEDICAL ADVICE (OUTPATIENT)
Dept: FAMILY MEDICINE | Facility: CLINIC | Age: 73
End: 2018-01-16

## 2018-01-16 ENCOUNTER — E-VISIT (OUTPATIENT)
Dept: FAMILY MEDICINE | Facility: CLINIC | Age: 73
End: 2018-01-16
Payer: COMMERCIAL

## 2018-01-16 ENCOUNTER — TELEPHONE (OUTPATIENT)
Dept: FAMILY MEDICINE | Facility: CLINIC | Age: 73
End: 2018-01-16

## 2018-01-16 DIAGNOSIS — J20.9 ACUTE BRONCHITIS, UNSPECIFIED ORGANISM: ICD-10-CM

## 2018-01-16 DIAGNOSIS — J40 BRONCHITIS: Primary | ICD-10-CM

## 2018-01-16 PROCEDURE — 99444 ZZC PHYSICIAN ONLINE EVALUATION & MANAGEMENT SERVICE: CPT | Performed by: FAMILY MEDICINE

## 2018-01-16 RX ORDER — AZITHROMYCIN 250 MG/1
TABLET, FILM COATED ORAL
Qty: 6 TABLET | Refills: 0 | OUTPATIENT
Start: 2018-01-16

## 2018-01-17 RX ORDER — AZITHROMYCIN 250 MG/1
TABLET, FILM COATED ORAL
Qty: 6 TABLET | Refills: 0 | Status: SHIPPED | OUTPATIENT
Start: 2018-01-17 | End: 2018-03-08

## 2018-01-18 ENCOUNTER — MYC MEDICAL ADVICE (OUTPATIENT)
Dept: FAMILY MEDICINE | Facility: CLINIC | Age: 73
End: 2018-01-18

## 2018-01-18 DIAGNOSIS — R05.9 COUGH: Primary | ICD-10-CM

## 2018-01-19 RX ORDER — BENZONATATE 100 MG/1
100 CAPSULE ORAL 3 TIMES DAILY PRN
Qty: 42 CAPSULE | Refills: 0 | Status: SHIPPED | OUTPATIENT
Start: 2018-01-19 | End: 2018-11-14

## 2018-02-20 ENCOUNTER — MYC MEDICAL ADVICE (OUTPATIENT)
Dept: FAMILY MEDICINE | Facility: CLINIC | Age: 73
End: 2018-02-20

## 2018-02-20 DIAGNOSIS — Z20.828 EXPOSURE TO INFLUENZA: Primary | ICD-10-CM

## 2018-02-21 RX ORDER — OSELTAMIVIR PHOSPHATE 75 MG/1
75 CAPSULE ORAL DAILY
Qty: 10 CAPSULE | Refills: 0 | Status: SHIPPED | OUTPATIENT
Start: 2018-02-21 | End: 2019-01-15

## 2018-03-08 DIAGNOSIS — J40 BRONCHITIS: ICD-10-CM

## 2018-03-08 RX ORDER — AZITHROMYCIN 250 MG/1
TABLET, FILM COATED ORAL
Qty: 6 TABLET | Refills: 0 | Status: SHIPPED | OUTPATIENT
Start: 2018-03-08 | End: 2018-05-04

## 2018-03-08 NOTE — TELEPHONE ENCOUNTER
"Reason for call:  Patient reporting a symptom    Symptom or request: \"Marcy\" says she usually has standing orders for antibiotics from Dr Dasilva to take when she feels she is getting something in her chest. She says she gave her Doxy in the past but changed it to Zithromax. She feels she has something coming on. She says she has been exposed to her grandson who had Influenza. She says she has a Rx for Tamiflu she can fill at the pharmacy is she needs but isn't sure if what she has going on now is her usual bacterial infection or if it's influenza. She did call the pharmacy for refill of the Zithromax. She said last time Dr Dasilva didn't put any refills on it.     Phone Number patient can be reached at:  Home number on file [phone] (home)    Best Time:  anytime    Can we leave a detailed message on this number:  YES    Call taken on 3/8/2018 at 8:57 AM by Sherine Carroll    "

## 2018-03-08 NOTE — TELEPHONE ENCOUNTER
S-(situation): asking if can have refill of azirthromycin    B-(background): states coughing started about 2 days ago.  Cough is productive she says.  Denies nausea, vomiting, diarrhea or fever.  Did not use the Tamiflu that was ordered 2-22-18.  Cough sounds deep and loose to me over the phone.  States does not feel too bad.  Denies shortness of air.  Last dose of zithromax 12-18-17.   Was prescribed tessalon in January 2018 and she has some of that and will try that.  History of bronchiectasis    A-(assessment): cough, no fever    R-(recommendations): will discuss with Dr Dasilva. Marcy is aware that Dr Dasilva is out of the office today and this is OK to wait.  Ayanna Bernard RN

## 2018-04-08 ENCOUNTER — HEALTH MAINTENANCE LETTER (OUTPATIENT)
Age: 73
End: 2018-04-08

## 2018-04-19 ENCOUNTER — MYC MEDICAL ADVICE (OUTPATIENT)
Dept: FAMILY MEDICINE | Facility: CLINIC | Age: 73
End: 2018-04-19

## 2018-04-19 DIAGNOSIS — J40 BRONCHITIS: ICD-10-CM

## 2018-04-19 RX ORDER — AZITHROMYCIN 250 MG/1
TABLET, FILM COATED ORAL
Qty: 6 TABLET | Refills: 0 | OUTPATIENT
Start: 2018-04-19

## 2018-04-19 NOTE — TELEPHONE ENCOUNTER
Note      Discussed this with Ivelisse Lane.  Told patient she needs to do an E Visit or needs to be seen.  Ayanna Bernard RN

## 2018-04-19 NOTE — TELEPHONE ENCOUNTER
Requested Prescriptions   Pending Prescriptions Disp Refills     azithromycin (ZITHROMAX) 250 MG tablet [Pharmacy Med Name: AZITHROMYCIN 250MG TABS] 6 tablet 0     Sig: TAKE 2 TABLETS BY MOUTH ON DAY 1, THEN 1 TABLET DAILY ON DAYS 2 THROUGH 5.    There is no refill protocol information for this order        Last Written Prescription Date:  4/19/18  Last Fill Quantity: 6,  # refills: 0   Last office visit: 7/27/2017 with prescribing provider:  Vidya   Future Office Visit:

## 2018-04-19 NOTE — TELEPHONE ENCOUNTER
Discussed this with Ivelisse Lane.  Told patient she needs to do an E Visit or needs to be seen.  Ayanna Bernard RN

## 2018-04-23 ENCOUNTER — E-VISIT (OUTPATIENT)
Dept: FAMILY MEDICINE | Facility: CLINIC | Age: 73
End: 2018-04-23
Payer: COMMERCIAL

## 2018-04-23 DIAGNOSIS — J47.1 BRONCHIECTASIS WITH ACUTE EXACERBATION (H): Primary | ICD-10-CM

## 2018-04-23 PROCEDURE — 99444 ZZC PHYSICIAN ONLINE EVALUATION & MANAGEMENT SERVICE: CPT | Performed by: FAMILY MEDICINE

## 2018-04-23 RX ORDER — AZITHROMYCIN 250 MG/1
TABLET, FILM COATED ORAL
Qty: 6 TABLET | Refills: 0 | Status: SHIPPED | OUTPATIENT
Start: 2018-04-23 | End: 2018-05-04

## 2018-05-04 ENCOUNTER — OFFICE VISIT (OUTPATIENT)
Dept: FAMILY MEDICINE | Facility: CLINIC | Age: 73
End: 2018-05-04
Payer: COMMERCIAL

## 2018-05-04 VITALS
TEMPERATURE: 99 F | SYSTOLIC BLOOD PRESSURE: 122 MMHG | BODY MASS INDEX: 29.85 KG/M2 | HEIGHT: 67 IN | HEART RATE: 72 BPM | DIASTOLIC BLOOD PRESSURE: 76 MMHG | WEIGHT: 190.2 LBS

## 2018-05-04 DIAGNOSIS — K58.2 IRRITABLE BOWEL SYNDROME WITH BOTH CONSTIPATION AND DIARRHEA: ICD-10-CM

## 2018-05-04 DIAGNOSIS — R53.83 FATIGUE, UNSPECIFIED TYPE: ICD-10-CM

## 2018-05-04 DIAGNOSIS — J47.1 BRONCHIECTASIS WITH ACUTE EXACERBATION (H): Primary | ICD-10-CM

## 2018-05-04 DIAGNOSIS — G89.29 CHRONIC LEFT-SIDED LOW BACK PAIN WITH LEFT-SIDED SCIATICA: ICD-10-CM

## 2018-05-04 DIAGNOSIS — J45.30 MILD PERSISTENT ASTHMA WITHOUT COMPLICATION: ICD-10-CM

## 2018-05-04 DIAGNOSIS — D12.6 ADENOMATOUS POLYP OF COLON, UNSPECIFIED PART OF COLON: ICD-10-CM

## 2018-05-04 DIAGNOSIS — M54.42 CHRONIC LEFT-SIDED LOW BACK PAIN WITH LEFT-SIDED SCIATICA: ICD-10-CM

## 2018-05-04 PROCEDURE — 36415 COLL VENOUS BLD VENIPUNCTURE: CPT | Performed by: FAMILY MEDICINE

## 2018-05-04 PROCEDURE — 82607 VITAMIN B-12: CPT | Performed by: FAMILY MEDICINE

## 2018-05-04 PROCEDURE — 82306 VITAMIN D 25 HYDROXY: CPT | Performed by: FAMILY MEDICINE

## 2018-05-04 PROCEDURE — 83516 IMMUNOASSAY NONANTIBODY: CPT | Mod: 59 | Performed by: FAMILY MEDICINE

## 2018-05-04 PROCEDURE — 99214 OFFICE O/P EST MOD 30 MIN: CPT | Performed by: FAMILY MEDICINE

## 2018-05-04 PROCEDURE — 84443 ASSAY THYROID STIM HORMONE: CPT | Performed by: FAMILY MEDICINE

## 2018-05-04 PROCEDURE — 83516 IMMUNOASSAY NONANTIBODY: CPT | Performed by: FAMILY MEDICINE

## 2018-05-04 RX ORDER — AZITHROMYCIN 250 MG/1
TABLET, FILM COATED ORAL
Qty: 6 TABLET | Refills: 11 | Status: SHIPPED | OUTPATIENT
Start: 2018-05-04 | End: 2019-12-16

## 2018-05-04 ASSESSMENT — ANXIETY QUESTIONNAIRES
6. BECOMING EASILY ANNOYED OR IRRITABLE: NOT AT ALL
7. FEELING AFRAID AS IF SOMETHING AWFUL MIGHT HAPPEN: NOT AT ALL
GAD7 TOTAL SCORE: 0
2. NOT BEING ABLE TO STOP OR CONTROL WORRYING: NOT AT ALL
3. WORRYING TOO MUCH ABOUT DIFFERENT THINGS: NOT AT ALL
4. TROUBLE RELAXING: NOT AT ALL
7. FEELING AFRAID AS IF SOMETHING AWFUL MIGHT HAPPEN: NOT AT ALL
1. FEELING NERVOUS, ANXIOUS, OR ON EDGE: NOT AT ALL
GAD7 TOTAL SCORE: 0
5. BEING SO RESTLESS THAT IT IS HARD TO SIT STILL: NOT AT ALL
GAD7 TOTAL SCORE: 0

## 2018-05-04 ASSESSMENT — PATIENT HEALTH QUESTIONNAIRE - PHQ9
SUM OF ALL RESPONSES TO PHQ QUESTIONS 1-9: 2
10. IF YOU CHECKED OFF ANY PROBLEMS, HOW DIFFICULT HAVE THESE PROBLEMS MADE IT FOR YOU TO DO YOUR WORK, TAKE CARE OF THINGS AT HOME, OR GET ALONG WITH OTHER PEOPLE: SOMEWHAT DIFFICULT
SUM OF ALL RESPONSES TO PHQ QUESTIONS 1-9: 2

## 2018-05-04 NOTE — PATIENT INSTRUCTIONS
Set up Dietician appt     Keep journaling food intake    Labs today     zpak at pharmacy when you need it   Let me know if you fill it    Set up colonoscopy     Set up PT for you back

## 2018-05-04 NOTE — PROGRESS NOTES
SUBJECTIVE:   Ruba Farmer is a 72 year old female who presents to clinic today for the following health issues:    PHQ-9 ([employer]) 5/4/2018   1.  Little interest or pleasure in doing things 0   2.  Feeling down, depressed, or hopeless 0   3.  Trouble falling or staying asleep, or sleeping too much 0   4.  Feeling tired or having little energy 1   5.  Poor appetite or overeating 1   6.  Feeling bad about yourself 0   7.  Trouble concentrating 0   8.  Moving slowly or restless 0   9.  Suicidal or self-harm thoughts 0   PHQ-9 Total Score 2     JARETT-7   [employer], 2002; Used with Permission) 5/4/2018   1. Feeling nervous, anxious, or on edge 0   2. Not being able to stop or control worrying 0   3. Worrying too much about different things 0   4. Trouble relaxing 0   5. Being so restless that it is hard to sit still 0   6. Becoming easily annoyed or irritable 0   7. Feeling afraid, as if something awful might happen 0   JARETT-7 Total Score 0     Lesion removal from back  Multiple skin tags on back that are itchy     Discuss possible celiac disease - IBS-       Duration: several years    Description (location/character/radiation): chronic constipation, alternate with diarrhea, dry skin    Notices that she has diarrhea after gluten or wheat       Intensity:  mild    Accompanying signs and symptoms: n/a    History (similar episodes/previous evaluation): None    Precipitating or alleviating factors: None    Therapies tried and outcome: dietary changes help    She does have some fatigue and dry skin   Has not been able to lose weight   She is easily bloated and gassy      Discuss standing orders for bronchiectsis  Pt with known bronchietasis and gets sice 3-4 times per year has had pulmonary eval she is stable not getting worse   She has no new sxs       Pt with chronic low back pain and has had sig help with PT she is wanting to go back   Has left sciatic area pain     Problem list and histories reviewed & adjusted, as  "indicated.  Additional history: as documented    Labs reviewed in EPIC    Reviewed and updated as needed this visit by clinical staff  Tobacco  Allergies  Meds  Problems  Med Hx  Surg Hx  Fam Hx  Soc Hx        Reviewed and updated as needed this visit by Provider  Allergies  Meds  Problems         ROS:  Constitutional, HEENT, cardiovascular, pulmonary, gi and gu systems are negative, except as otherwise noted.    OBJECTIVE:                                                    /76 (BP Location: Right arm, Patient Position: Chair, Cuff Size: Adult Large)  Pulse 72  Temp 99  F (37.2  C) (Tympanic)  Ht 5' 6.75\" (1.695 m)  Wt 190 lb 3.2 oz (86.3 kg)  BMI 30.01 kg/m2  Body mass index is 30.01 kg/(m^2).  GENERAL APPEARANCE: healthy, alert and no distress  HENT: ear canals and TM's normal and nose and mouth without ulcers or lesions  NECK: no adenopathy, no asymmetry, masses, or scars and thyroid normal to palpation  RESP: lungs clear to auscultation - no rales, rhonchi or wheezes  CV: regular rates and rhythm, normal S1 S2, no S3 or S4 and no murmur, click or rub  ABDOMEN: soft, nontender, without hepatosplenomegaly or masses and bowel sounds normal  MS: extremities normal- no gross deformities noted  PSYCH: mentation appears normal and affect normal/bright         ASSESSMENT/PLAN:                                                    1. Bronchiectasis with acute exacerbation (H)  Stable no change in treatment plan.   - azithromycin (ZITHROMAX) 250 MG tablet; Two tablets first day, then one tablet daily for four days.  Dispense: 6 tablet; Refill: 11    2. Adenomatous polyp of colon, unspecified part of colon  Needs scope   - GASTROENTEROLOGY ADULT REF PROCEDURE ONLY    3. Mild persistent asthma without complication  Stable no change in treatment plan.     4. Fatigue, unspecified type  ?some deficiancy vs thyroid vs other   - Tissue transglutaminase eva IgA and IgG  - Vitamin B12  - Vitamin D Deficiency  - " TSH with free T4 reflex    5. Irritable bowel syndrome with both constipation and diarrhea  Possible mild celiac   - NUTRITION REFERRAL    6. Chronic left-sided low back pain with left-sided sciatica    - PHYSICAL THERAPY REFERRAL      Patient Instructions   Set up Dietician appt     Keep journaling food intake    Labs today     zpak at pharmacy when you need it   Let me know if you fill it    Set up colonoscopy     Set up PT for you back         Risks, benefits, side effects and rationale for treatment plan fully discussed with the patient and understanding expressed.   Xiao Dasilva MD  [facility]

## 2018-05-04 NOTE — MR AVS SNAPSHOT
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      Patient will be contacted to schedule procedure.     Please be aware that coverage of these services is subject to the terms and limitations of your health insurance plan.  Call member services at your health plan with any benefit or coverage questions.  Any procedures must be performed at a [place] facility OR coordinated by your clinic's referral office.    Please bring the following with you to your appointment:    (1) Any X-Rays, CTs or MRIs which have been performed.  Contact the facility where they were done to arrange for  prior to your scheduled appointment.    (2) List of current medications   (3) This referral request   (4) Any documents/labs given to you for this referral            NUTRITION REFERRAL       Your provider has referred you to: TRES: [place]  "[facility] [phone]   [email or web address]/    Please be aware that coverage of these services is subject to the terms and limitations of your health insurance plan.  Call member services at your health plan with any benefit or coverage questions.      Please bring the following with you to your appointment:    (1) This referral request  (2) Any documents given to you regarding this referral  (3) Any specific questions you have about diet and/or food choices            PHYSICAL THERAPY REFERRAL       *This therapy referral will be filtered to a centralized scheduling office at [facility] and the patient will receive a call to schedule an appointment at a [facility] location most convenient for them. *     [facility] provides Physical Therapy evaluation and treatment and many specialty services across the [facility] system.  If requesting a specialty program, please choose from the list below.    If you have not heard from the scheduling office within 2 business days, please call [phone] for all locations, with the exception of [place], please call [phone] and [facility], please call [phone]  Treatment: Evaluation & Treatment  Special Instructions/Modalities:   Special Programs: None    Please be aware that coverage of these services is subject to the terms and limitations of your health insurance plan.  Call member services at your health plan with any benefit or coverage questions.      **Note to Provider:  If you are referring outside of [facility] for the therapy appointment, please list the name of the location in the \"special instructions\" above, print the referral and give to the patient to schedule the appointment.                  Who to contact     If you have questions or need follow up information about today's clinic visit or your schedule please contact [facility] directly at " "[phone].  Normal or non-critical lab and imaging results will be communicated to you by [employer]hart, letter or phone within 4 business days after the clinic has received the results. If you do not hear from us within 7 days, please contact the clinic through [employer] or phone. If you have a critical or abnormal lab result, we will notify you by phone as soon as possible.  Submit refill requests through [employer] or call your pharmacy and they will forward the refill request to us. Please allow 3 business days for your refill to be completed.          Additional Information About Your Visit        [employer] Information     [employer] gives you secure access to your electronic health record. If you see a primary care provider, you can also send messages to your care team and make appointments. If you have questions, please call your primary care clinic.  If you do not have a primary care provider, please call [phone] and they will assist you.        Care EveryWhere ID     This is your Care EveryWhere ID. This could be used by other organizations to access your [facility] medical records  [id number]        Your Vitals Were     Pulse Temperature Height BMI (Body Mass Index)          72 99  F (37.2  C) (Tympanic) 5' 6.75\" (1.695 m) 30.01 kg/m2         Blood Pressure from Last 3 Encounters:   05/04/18 122/76   07/27/17 128/76   04/19/17 136/74    Weight from Last 3 Encounters:   05/04/18 190 lb 3.2 oz (86.3 kg)   07/27/17 191 lb 12.8 oz (87 kg)   04/19/17 194 lb (88 kg)              We Performed the Following     GASTROENTEROLOGY ADULT REF PROCEDURE ONLY     NUTRITION REFERRAL     PHYSICAL THERAPY REFERRAL     Tissue transglutaminase eva IgA and IgG     TSH with free T4 reflex     Vitamin B12     Vitamin D Deficiency          Today's Medication Changes          These changes are accurate as of 5/4/18 11:46 AM.  If you have any questions, ask your nurse or doctor.               Stop taking these medicines if you haven't " already. Please contact your care team if you have questions.     fluticasone 220 MCG/ACT Inhaler   Commonly known as:  FLOVENT HFA   Stopped by:  Xiao Dasilva MD                Where to get your medicines      These medications were sent to [facility] [address] [place] [address]     Phone:  [phone]     azithromycin 250 MG tablet                Primary Care Provider Office Phone # Fax #    Xiao Dasilva [phone] [address] [place] [address]        Equal Access to Services     [facility]: Hadii aad ku hadasho Soomaali, waaxda luqadaha, qaybta kaalmada adeegyada, waxnicholas osborne hayaan adejinny cordova . So [facility] [phone].    ATENCIÓN: Si habla español, tiene a aguilera disposición servicios gratuitos de asistencia lingüística. [facility] [phone].    We comply with applicable federal civil rights laws and [place] laws. We do not discriminate on the basis of race, color, national origin, age, disability, sex, sexual orientation, or gender identity.            Thank you!     Thank you for choosing [facility]  for your care. Our goal is always to provide you with excellent care. Hearing back from our patients is one way we can continue to improve our services. Please take a few minutes to complete the written survey that you may receive in the mail after your visit with us. Thank you!             Your Updated Medication List - Protect others around you: Learn how to safely use, store and throw away your medicines at www.disposemymeds.org.          This list is accurate as of 5/4/18 11:46 AM.  Always use your most recent med list.                   Brand Name Dispense Instructions for use Diagnosis    albuterol 108 (90 Base) MCG/ACT Inhaler    PROAIR HFA/PROVENTIL HFA/VENTOLIN HFA    1 Inhaler    Inhale 2 puffs into the lungs every 6 hours as needed for shortness of  breath / dyspnea    Chronic cough       ALLEGRA ALLERGY PO      Take by mouth daily        azithromycin 250 MG tablet    ZITHROMAX    6 tablet    Two tablets first day, then one tablet daily for four days.    Bronchiectasis with acute exacerbation (H)       benzonatate 100 MG capsule    TESSALON    42 capsule    Take 1 capsule (100 mg) by mouth 3 times daily as needed for cough    Cough       COCONUT OIL PO      Take 2 capsules by mouth daily        COQ10 PO      Take 1 capsule by mouth daily        Digestive Enzyme Caps           estradiol 0.1 MG/GM cream    ESTRACE VAGINAL    42.5 g    Place 2 g vaginally twice a week    Urinary frequency       fluticasone 50 MCG/ACT spray    FLONASE    16 g    Spray 1-2 sprays into both nostrils daily    Post-nasal drip       ibuprofen 200 MG tablet    ADVIL/MOTRIN    120    TAKE 1 TO 3 TABLETS EVERY 6 HOURS AS NEEDED WITH FOOD for 7-14 days    Strain of lumbar region       ipratropium 0.06 % spray    ATROVENT    3 Box    Spray 2 sprays into both nostrils 3 times daily as needed for rhinitis    Post-nasal drip       LORazepam 0.5 MG tablet    ATIVAN    20 tablet    Take 1 tablet (0.5 mg) by mouth every 8 hours as needed for anxiety    Anxiety       oseltamivir 75 MG capsule    TAMIFLU    10 capsule    Take 1 capsule (75 mg) by mouth daily    Exposure to influenza       OVER-THE-COUNTER      D-monos takes after SIC to prevent UTIs        POTASSIUM CITRATE PO      Take 1 tablet by mouth daily        PROBIOTIC DAILY PO      Take 1 tablet by mouth daily        SUDAFED 30 MG tablet   Generic drug:  pseudoePHEDrine      Take 30 mg by mouth every 4 hours as needed for congestion        sulfamethoxazole-trimethoprim 800-160 MG per tablet    BACTRIM DS/SEPTRA DS    30 tablet    TAKE ONE TABLET BY MOUTH AFTER INTERCOURSE    History of urinary tract infection       Vitamin C 500 MG Caps      Take 500 mg by mouth daily        vitamin D 2000 units Caps      Take 2,000 Units by mouth daily

## 2018-05-04 NOTE — NURSING NOTE
"Chief Complaint   Patient presents with     Lesion Removal     Gastric Problem       Initial /76 (BP Location: Right arm, Patient Position: Chair, Cuff Size: Adult Large)  Pulse 72  Temp 99  F (37.2  C) (Tympanic)  Ht 5' 6.75\" (1.695 m)  Wt 190 lb 3.2 oz (86.3 kg)  BMI 30.01 kg/m2 Estimated body mass index is 30.01 kg/(m^2) as calculated from the following:    Height as of this encounter: 5' 6.75\" (1.695 m).    Weight as of this encounter: 190 lb 3.2 oz (86.3 kg).      Health Maintenance that is potentially due pending provider review:  Colonoscopy/FIT    Possibly completing today per provider review.    Is there anyone who you would like to be able to receive your results? No  If yes have patient fill out TUNG    "

## 2018-05-05 LAB
TSH SERPL DL<=0.005 MIU/L-ACNC: 2.42 MU/L (ref 0.4–4)
VIT B12 SERPL-MCNC: 592 PG/ML (ref 193–986)

## 2018-05-05 ASSESSMENT — ASTHMA QUESTIONNAIRES: ACT_TOTALSCORE: 23

## 2018-05-05 ASSESSMENT — PATIENT HEALTH QUESTIONNAIRE - PHQ9: SUM OF ALL RESPONSES TO PHQ QUESTIONS 1-9: 2

## 2018-05-05 ASSESSMENT — ANXIETY QUESTIONNAIRES: GAD7 TOTAL SCORE: 0

## 2018-05-06 LAB — DEPRECATED CALCIDIOL+CALCIFEROL SERPL-MC: 40 UG/L (ref 20–75)

## 2018-05-07 LAB
TTG IGA SER-ACNC: <1 U/ML
TTG IGG SER-ACNC: 1 U/ML

## 2018-05-16 ENCOUNTER — HOSPITAL ENCOUNTER (OUTPATIENT)
Dept: PHYSICAL THERAPY | Facility: CLINIC | Age: 73
Setting detail: THERAPIES SERIES
End: 2018-05-16
Attending: FAMILY MEDICINE
Payer: MEDICARE

## 2018-05-16 PROCEDURE — 97140 MANUAL THERAPY 1/> REGIONS: CPT | Mod: GP | Performed by: PHYSICAL THERAPIST

## 2018-05-16 PROCEDURE — G8978 MOBILITY CURRENT STATUS: HCPCS | Mod: GP,CJ | Performed by: PHYSICAL THERAPIST

## 2018-05-16 PROCEDURE — 40000718 ZZHC STATISTIC PT DEPARTMENT ORTHO VISIT: Performed by: PHYSICAL THERAPIST

## 2018-05-16 PROCEDURE — 97110 THERAPEUTIC EXERCISES: CPT | Mod: GP | Performed by: PHYSICAL THERAPIST

## 2018-05-16 PROCEDURE — G8979 MOBILITY GOAL STATUS: HCPCS | Mod: GP,CI | Performed by: PHYSICAL THERAPIST

## 2018-05-16 PROCEDURE — 97161 PT EVAL LOW COMPLEX 20 MIN: CPT | Mod: GP | Performed by: PHYSICAL THERAPIST

## 2018-05-16 NOTE — PROGRESS NOTES
[facility]          OUTPATIENT PHYSICAL THERAPY ORTHOPEDIC EVALUATION  PLAN OF TREATMENT FOR OUTPATIENT REHABILITATION  (COMPLETE FOR INITIAL CLAIMS ONLY)  Patient's Last Name, First Name, M.I.  YOB: 1945  Ruba Farmer    Provider s Name:  [facility]   Medical Record No.  [id number]   Start of Care Date:  05/16/18   Onset Date:  03/16/18   Type:     _X__PT   ___OT   ___SLP Medical Diagnosis:  Chronic left-sided low back pain with left-sided sciatica     PT Diagnosis:  Chronic-recurrent low back pain; left sciatic pain   Visits from SOC:  1      _________________________________________________________________________________  Plan of Treatment/Functional Goals:  ADL retraining, balance training, joint mobilization, manual therapy, motor coordination training, neuromuscular re-education, ROM, strengthening, stretching     Cryotherapy, Electrical stimulation     Goals     Goal Description: Patient will have >=4+/5 left hip ABD strength to support the lumbar spine with ADL's.  Target Date: 07/11/18       Goal Description: Patient will be able to drive for >=4 hours without difficulty.  Target Date: 08/08/18       Goal Description: Patient will be independent with her HEP to reduce future occurrence of pain and disability.  Target Date: 05/16/18        Therapy Frequency:  other (see comments)  Predicted Duration of Therapy Intervention:  Pt to return only if needed; anticipate 1-3 visits over 12 weeks    Myrna Nascimento, PT                 I CERTIFY THE NEED FOR THESE SERVICES FURNISHED UNDER        THIS PLAN OF TREATMENT AND WHILE UNDER MY CARE     (Physician co-signature of this document indicates review and certification of the therapy plan).                       Certification Date From:  05/16/18   Certification Date To:  08/08/18    Referring Provider:  Xiao Ivan MD    Initial Assessment        See Epic Evaluation Start of Care Date:  05/16/18

## 2018-05-16 NOTE — PROGRESS NOTES
05/16/18 1100   General Information   Type of Visit Initial OP Ortho PT Evaluation   Start of Care Date 05/16/18   Referring Physician Xiao Ivan MD   Patient/Family Goals Statement To learn HEP for back pain   Orders Evaluate and Treat   Date of Order 05/04/18   Insurance Type Medicare;Other   Insurance Comments/Visits Authorized Medicare; [employer] ($2010 remaining in PT/SLP cap)   Medical Diagnosis Chronic left-sided low back pain with left-sided sciatica   Surgical/Medical history reviewed Yes   Precautions/Limitations no known precautions/limitations       Present No   Body Part(s)   Body Part(s) Lumbar Spine/SI   Presentation and Etiology   Pertinent history of current problem (include personal factors and/or comorbidities that impact the POC) Patient reports: over the past two months she has been feeling left sciatic nerve pain.  Pain is in the butt and posterior thigh region.     Impairments A. Pain;E. Decreased flexibility;D. Decreased ROM   Functional Limitations perform activities of daily living;perform required work activities;perform desired leisure / sports activities   Symptom Location Left SI joint; posterior hip and posterior thigh left   How/Where did it occur From insidious onset   Onset date of current episode/exacerbation 03/16/18   Chronicity Recurrent   Pain rating (0-10 point scale) Best (/10);Worst (/10)   Best (/10) 0   Worst (/10) 5   Pain quality B. Dull   Frequency of pain/symptoms B. Intermittent   Pain/symptoms are: The same all the time   Pain/symptoms exacerbated by A. Sitting;G. Certain positions;K. Home tasks;L. Work tasks;I. Bending   Pain/symptoms eased by E. Changing positions   Progression of symptoms since onset: Unchanged   Prior Level of Function   Prior Level of Function-Mobility Independent   Prior Level of Function-ADLs Independent   Current Level of Function   Current Community Support Family/friend caregiver   Patient  role/employment history F. Retired   Living environment [facility]   Fall Risk Screen   Fall screen completed by PT   Have you fallen 2 or more times in the past year? No   Have you fallen and had an injury in the past year? No   Is patient a fall risk? No   System Outcome Measures   Outcome Measures Low Back Pain (see Oswestry and [place])   Lumbar Spine/SI Objective Findings   Flexion ROM 50% increased left stretch   Extension ROM 30% increased left LB pain   Hip Abduction Strength Right=4/5; Left=3+/5   Hip Extension Strength Jovani=4/5   Pelvic Screen Hip flexion right=120 deg; Left=90 deg with increased left posterior hip pain   Posture Left hemipelvic anterior tilt   Lumbar/Hip/Knee/Foot Strength Comments No myotomal weakness   Planned Therapy Interventions   Planned Therapy Interventions ADL retraining;balance training;joint mobilization;manual therapy;motor coordination training;neuromuscular re-education;ROM;strengthening;stretching   Planned Modality Interventions   Planned Modality Interventions Cryotherapy;Electrical stimulation   Clinical Impression   Criteria for Skilled Therapeutic Interventions Met yes, treatment indicated   PT Diagnosis Chronic-recurrent low back pain; left sciatic pain   Influenced by the following impairments Pain; weakness; impaired ROM; impaired posture   Functional limitations due to impairments Pain and difficulty with prolonged sitting   Clinical Presentation Stable/Uncomplicated   Clinical Presentation Rationale (+) motivation; previous success with PT; desire to perform HEP independently   (-) chronicity   Clinical Decision Making (Complexity) Low complexity   Therapy Frequency other (see comments)   Predicted Duration of Therapy Intervention (days/wks) Pt to return only if needed; anticipate 1-3 visits over 12 weeks   Risk & Benefits of therapy have been explained Yes   Patient, Family & other staff in agreement with plan of care Yes   Clinical Impression Comments Marcy mercer  a pleasant 73 yo female who presents today with recurrent low back pain and left sciatic N symptoms.  She will benefit from minimal skilled PT to educate in an appropriate HEP to address symptoms.'   Education Assessment   Preferred Learning Style Listening;Demonstration;Pictures/video   Barriers to Learning No barriers   ORTHO GOALS   PT Ortho Eval Goals 1;2;3   Ortho Goal 1   Goal Description Patient will have >=4+/5 left hip ABD strength to support the lumbar spine with ADL's.   Target Date 07/11/18   Ortho Goal 2   Goal Description Patient will be able to drive for >=4 hours without difficulty.   Target Date 08/08/18   Ortho Goal 3   Goal Description Patient will be independent with her HEP to reduce future occurrence of pain and disability.   Target Date 05/16/18   Date Met 05/16/18   Total Evaluation Time   Total Evaluation Time 15 min   Therapy Certification   Certification date from 05/16/18   Certification date to 08/08/18   Medical Diagnosis Chronic left-sided low back pain with left-sided sciatica       Myrna Nascimento, PT, DPT  Doctor of Physical Therapy #[id number]  [facility]  [phone]  [email or web address]

## 2018-05-21 ENCOUNTER — E-VISIT (OUTPATIENT)
Dept: FAMILY MEDICINE | Facility: CLINIC | Age: 73
End: 2018-05-21
Payer: COMMERCIAL

## 2018-05-21 DIAGNOSIS — K64.4 EXTERNAL HEMORRHOIDS: Primary | ICD-10-CM

## 2018-05-21 PROCEDURE — 99207 ZZC NO BILLABLE SERVICE THIS VISIT: CPT | Performed by: FAMILY MEDICINE

## 2018-06-27 ENCOUNTER — HOSPITAL ENCOUNTER (OUTPATIENT)
Dept: PHYSICAL THERAPY | Facility: CLINIC | Age: 73
Setting detail: THERAPIES SERIES
End: 2018-06-27
Attending: FAMILY MEDICINE
Payer: MEDICARE

## 2018-06-27 PROCEDURE — 40000718 ZZHC STATISTIC PT DEPARTMENT ORTHO VISIT: Performed by: PHYSICAL THERAPIST

## 2018-06-27 PROCEDURE — G8978 MOBILITY CURRENT STATUS: HCPCS | Mod: GP,CJ | Performed by: PHYSICAL THERAPIST

## 2018-06-27 PROCEDURE — G8980 MOBILITY D/C STATUS: HCPCS | Mod: GP,CI | Performed by: PHYSICAL THERAPIST

## 2018-06-27 PROCEDURE — G8979 MOBILITY GOAL STATUS: HCPCS | Mod: GP,CI | Performed by: PHYSICAL THERAPIST

## 2018-06-27 PROCEDURE — 97110 THERAPEUTIC EXERCISES: CPT | Mod: GP | Performed by: PHYSICAL THERAPIST

## 2018-06-27 NOTE — PROGRESS NOTES
Outpatient Physical Therapy Progress Note     Patient: Ruba Farmer  : 1945    Beginning/End Dates of Reporting Period:  18 to 2018    Referring Provider: Dr. Xiao Ivan MD    Therapy Diagnosis: Chronic left-sided low back pain with left-sided sciatica     Client Self Report: The low back and sciatic nerve pain has improved (is less intense).  Noticing mid/lower back spasms with prolonged car rides or while sleeping.  This is a more recent pain.    Objective Measurements:  Objective Measure: Thoracic mobility  Details: Limited bilateral rotation 45 degrees right; 30 degrees left  Objective Measure: Time able to car ride before LBP  Details: <=1 hour            Goals:  Goal Identifier     Goal Description Patient will have >=4+/5 left hip ABD strength to support the lumbar spine with ADL's.   Target Date 18   Date Met      Progress:     Goal Identifier     Goal Description Patient will be able to drive for >=4 hours without difficulty.   Target Date 18   Date Met      Progress:     Goal Identifier     Goal Description Patient will be independent with her HEP to reduce future occurrence of pain and disability.   Target Date 18   Date Met  18   Progress:     Progress Toward Goals:   Progress this reporting period: Marcy arrives today 6 weeks status post initial evaluation with improving low back and left sciatic nerve pain.  She is experiencing lumbar and thoracic pain with sustained postures.  Thoracic, quadratus lumborum, and latissimus dorsi mobility was addressed today. Anticipate she will need only 1 final visit in 6-8 weeks.      Plan:  Changes to therapy plan of care: HEP was modified; patient to continue independently for the next 6 weeks.    Discharge:  No    Myrna Nascimento, PT, DPT  Doctor of Physical Therapy #[id number]  [facility]  [phone]  [email or web address]        RECERTIFICATION    Ruba VIEIRA  Darian  1945    Session Number: 2  MC since start of care.    Reasons for Continuing Treatment:   One final visit in 6-8 weeks to address any remaining pain, stiffness, or weakness.    Frequency/Duration  1 times per month1 for 1-2 weeks for a total of 1-2 visits.    Recertification Period  6/26/18 - 8/22/18    Physician Signature:    Date:    X_______________________________________________________    Physician Name: Dr. Xiao Ivan MD    I certify the need for these services furnished under this plan of treatment and while under my care. Physician co-signature of this document indicates review and certification of the therapy plan.  This signature may be written on paper, or electronically signed within EPIC.

## 2018-07-10 ENCOUNTER — ANESTHESIA EVENT (OUTPATIENT)
Dept: GASTROENTEROLOGY | Facility: CLINIC | Age: 73
End: 2018-07-10
Payer: MEDICARE

## 2018-07-10 NOTE — ANESTHESIA PREPROCEDURE EVALUATION
Anesthesia Evaluation     . Pt has had prior anesthetic. Type: General and MAC    No history of anesthetic complications          ROS/MED HX    ENT/Pulmonary: Comment: Bronchiectasis with acute exacerbation     (+)allergic rhinitis, Mild Persistent asthma Treatment: Inhaler prn,  , . .    Neurologic:  - neg neurologic ROS     Cardiovascular:  - neg cardiovascular ROS   (+) ----. : . . . :. . Previous cardiac testing date:results:date: results:ECG reviewed date:2-2017 results:Sinus Rhythm   WITHIN NORMAL LIMITS   date: results:          METS/Exercise Tolerance:  >4 METS   Hematologic:  - neg hematologic  ROS       Musculoskeletal:  - neg musculoskeletal ROS       GI/Hepatic:     (+) GERD Asymptomatic on medication, Other GI/Hepatic h/o polyps      Renal/Genitourinary:  - ROS Renal section negative       Endo:  - neg endo ROS       Psychiatric:     (+) psychiatric history anxiety      Infectious Disease:  - neg infectious disease ROS       Malignancy:      - no malignancy   Other:    - neg other ROS                 Physical Exam  Normal systems: cardiovascular and pulmonary    Airway   Mallampati: I  TM distance: >3 FB  Neck ROM: full    Dental   (+) caps    Cardiovascular       Pulmonary                     Anesthesia Plan      History & Physical Review  History and physical reviewed and following examination; no interval change.    ASA Status:  2 .    NPO Status:  > 4 hours    Plan for MAC Maintenance will be Balanced.  Reason for MAC:  Deep or markedly invasive procedure (G8)         Postoperative Care      Consents  Anesthetic plan, risks, benefits and alternatives discussed with:  Patient..                          .

## 2018-07-11 ENCOUNTER — ANESTHESIA (OUTPATIENT)
Dept: GASTROENTEROLOGY | Facility: CLINIC | Age: 73
End: 2018-07-11
Payer: MEDICARE

## 2018-07-11 ENCOUNTER — HOSPITAL ENCOUNTER (OUTPATIENT)
Facility: CLINIC | Age: 73
Discharge: HOME | End: 2018-07-11
Attending: SURGERY | Admitting: SURGERY
Payer: MEDICARE

## 2018-07-11 ENCOUNTER — SURGERY (OUTPATIENT)
Age: 73
End: 2018-07-11

## 2018-07-11 VITALS
BODY MASS INDEX: 29.51 KG/M2 | HEIGHT: 67 IN | WEIGHT: 188 LBS | RESPIRATION RATE: 16 BRPM | TEMPERATURE: 98.2 F | DIASTOLIC BLOOD PRESSURE: 75 MMHG | OXYGEN SATURATION: 98 % | SYSTOLIC BLOOD PRESSURE: 144 MMHG

## 2018-07-11 LAB — COLONOSCOPY: NORMAL

## 2018-07-11 PROCEDURE — 45381 COLONOSCOPY SUBMUCOUS NJX: CPT | Mod: PT | Performed by: SURGERY

## 2018-07-11 PROCEDURE — 37000008 ZZH ANESTHESIA TECHNICAL FEE, 1ST 30 MIN: Performed by: SURGERY

## 2018-07-11 PROCEDURE — 25000128 H RX IP 250 OP 636: Performed by: SURGERY

## 2018-07-11 PROCEDURE — 88305 TISSUE EXAM BY PATHOLOGIST: CPT | Mod: 26 | Performed by: SURGERY

## 2018-07-11 PROCEDURE — 25000128 H RX IP 250 OP 636: Performed by: NURSE ANESTHETIST, CERTIFIED REGISTERED

## 2018-07-11 PROCEDURE — 45385 COLONOSCOPY W/LESION REMOVAL: CPT | Mod: PT | Performed by: SURGERY

## 2018-07-11 PROCEDURE — 25000125 ZZHC RX 250: Performed by: SURGERY

## 2018-07-11 PROCEDURE — 88313 SPECIAL STAINS GROUP 2: CPT | Performed by: SURGERY

## 2018-07-11 PROCEDURE — 88305 TISSUE EXAM BY PATHOLOGIST: CPT | Performed by: SURGERY

## 2018-07-11 PROCEDURE — 37000009 ZZH ANESTHESIA TECHNICAL FEE, EACH ADDTL 15 MIN: Performed by: SURGERY

## 2018-07-11 PROCEDURE — 25000125 ZZHC RX 250: Performed by: NURSE ANESTHETIST, CERTIFIED REGISTERED

## 2018-07-11 PROCEDURE — 88313 SPECIAL STAINS GROUP 2: CPT | Mod: 26 | Performed by: SURGERY

## 2018-07-11 RX ORDER — PROPOFOL 10 MG/ML
INJECTION, EMULSION INTRAVENOUS PRN
Status: DISCONTINUED | OUTPATIENT
Start: 2018-07-11 | End: 2018-07-11

## 2018-07-11 RX ORDER — PROPOFOL 10 MG/ML
INJECTION, EMULSION INTRAVENOUS CONTINUOUS PRN
Status: DISCONTINUED | OUTPATIENT
Start: 2018-07-11 | End: 2018-07-11

## 2018-07-11 RX ORDER — LIDOCAINE HYDROCHLORIDE 10 MG/ML
INJECTION, SOLUTION INFILTRATION; PERINEURAL PRN
Status: DISCONTINUED | OUTPATIENT
Start: 2018-07-11 | End: 2018-07-11

## 2018-07-11 RX ORDER — ONDANSETRON 2 MG/ML
4 INJECTION INTRAMUSCULAR; INTRAVENOUS
Status: DISCONTINUED | OUTPATIENT
Start: 2018-07-11 | End: 2018-07-11 | Stop reason: HOSPADM

## 2018-07-11 RX ORDER — GLYCOPYRROLATE 0.2 MG/ML
INJECTION, SOLUTION INTRAMUSCULAR; INTRAVENOUS PRN
Status: DISCONTINUED | OUTPATIENT
Start: 2018-07-11 | End: 2018-07-11

## 2018-07-11 RX ORDER — SODIUM CHLORIDE, SODIUM LACTATE, POTASSIUM CHLORIDE, CALCIUM CHLORIDE 600; 310; 30; 20 MG/100ML; MG/100ML; MG/100ML; MG/100ML
INJECTION, SOLUTION INTRAVENOUS CONTINUOUS
Status: DISCONTINUED | OUTPATIENT
Start: 2018-07-11 | End: 2018-07-11 | Stop reason: HOSPADM

## 2018-07-11 RX ORDER — LIDOCAINE 40 MG/G
CREAM TOPICAL
Status: DISCONTINUED | OUTPATIENT
Start: 2018-07-11 | End: 2018-07-11 | Stop reason: HOSPADM

## 2018-07-11 RX ADMIN — PROPOFOL 40 MG: 10 INJECTION, EMULSION INTRAVENOUS at 11:35

## 2018-07-11 RX ADMIN — PROPOFOL 200 MCG/KG/MIN: 10 INJECTION, EMULSION INTRAVENOUS at 11:34

## 2018-07-11 RX ADMIN — PROPOFOL 30 MG: 10 INJECTION, EMULSION INTRAVENOUS at 12:02

## 2018-07-11 RX ADMIN — PROPOFOL 20 MG: 10 INJECTION, EMULSION INTRAVENOUS at 11:59

## 2018-07-11 RX ADMIN — SODIUM CHLORIDE, POTASSIUM CHLORIDE, SODIUM LACTATE AND CALCIUM CHLORIDE: 600; 310; 30; 20 INJECTION, SOLUTION INTRAVENOUS at 10:57

## 2018-07-11 RX ADMIN — PROPOFOL 30 MG: 10 INJECTION, EMULSION INTRAVENOUS at 11:55

## 2018-07-11 RX ADMIN — LIDOCAINE HYDROCHLORIDE 0.2 ML: 10 INJECTION, SOLUTION EPIDURAL; INFILTRATION; INTRACAUDAL; PERINEURAL at 10:58

## 2018-07-11 RX ADMIN — LIDOCAINE HYDROCHLORIDE 50 MG: 10 INJECTION, SOLUTION INFILTRATION; PERINEURAL at 11:34

## 2018-07-11 RX ADMIN — GLYCOPYRROLATE 0.2 MG: 0.2 INJECTION, SOLUTION INTRAMUSCULAR; INTRAVENOUS at 11:34

## 2018-07-11 NOTE — LETTER
Ruba Farmer  [address] [facility] [id number]      July 16, 2018    Dear Ruba,  This letter is to inform you of the results of your pathology report on your colonoscopy.  Your pathology report was:  FINAL DIAGNOSIS:   A.  Right colon, mucosal biopsy:   - No tissue identified; a small amount of fecal material present.     B.  Rectum, mucosal biopsies:   - Fragments of hyperplastic colon.   - Severe melanosis coli.  The polyp from the right colon was not found but may have been an adenoma by appearance. Those in the rectum were hyperplastic polyps which do not have a cancer risk. Please follow up by having a repeat colonoscopy in 5 years given your history of adenoma polyps.  If you have questions please feel free to call the clinic at [phone].    Sincerely,     Mark Blackman M.D.

## 2018-07-11 NOTE — H&P
"  GI Pre-Procedure History & Physical    Ruba Farmer MRN# [id number]   Age: 73 year old YOB: 1945      Date of Surgery: 7/11/2018  Location [facility]      Date of Exam 7/11/2018 Facility ([facility] Day Surgery)            Active problem list:   Patient Active Problem List   Diagnosis     Anxiety     CARDIOVASCULAR SCREENING; LDL GOAL LESS THAN 160     GERD (gastroesophageal reflux disease)     Advanced directives, counseling/discussion     Polyp of colon, adenomatous     Senile nuclear sclerosis     Recurrent UTI     Nonallergic rhinitis     Diagnostic skin and sensitization tests (aka ALLERGENS)     Mild persistent asthma without complication     Bronchiectasis with acute exacerbation (H)            Medications (include herbals and vitamins):   Any Plavix use in the last 7 days?  No     Current Facility-Administered Medications   Medication     lactated ringers infusion     lidocaine (LMX4) kit     lidocaine 1 % 1 mL     ondansetron (ZOFRAN) injection 4 mg     sodium chloride (PF) 0.9% PF flush 3 mL     sodium chloride (PF) 0.9% PF flush 3 mL     Facility-Administered Medications Ordered in Other Encounters   Medication     glycopyrrolate (ROBINUL) injection     lidocaine 1 % injection     propofol (DIPRIVAN) injection 10 mg/mL vial             Allergies:      Allergies   Allergen Reactions     Macrobid [Nitrofuran Derivatives] Shortness Of Breath and Other (See Comments)     High fever     Codeine Other (See Comments) and Nausea and Vomiting     headache             Social History:     Social History   Substance Use Topics     Smoking status: Never Smoker     Smokeless tobacco: Never Used     Alcohol use Yes      Comment: 2 daily (wine)            Physical Exam:   All vitals have been reviewed   Patient Vitals for the past 8 hrs:   BP Temp Temp src Heart Rate Resp SpO2 Height Weight   07/11/18 1029 146/73 98.2  F (36.8  C) Oral 69 18 98 % 1.702 m (5' 7\") 85.3 kg (188 lb)       Airway " assessment:   Patient is able to open mouth wide  Patient is able to stick out tongue   Constitutional: Awake, alert, cooperative, no apparent distress, and appears stated age.  Eyes: Pupils equal, round and reactive to light.  ENT: Normocephalic, sinuses nontender, external ears without lesions, oral pharynx with moist mucus membranes.  Neck: Supple, symmetrical, trachea midline, thyroid symmetric and non-tender.  Lungs: No increased work of breathing, good air exchange, clear to auscultation bilaterally, no crackles or wheezing.  Cardiovascular: Regular rate and rhythm, normal heart sound, and no murmur noted.  Abdomen: Normal bowel sounds, soft, non-distended, non-tender, no masses palpated.  Musculoskeletal: No cyanosis or edema.  Neurologic: Awake, alert, oriented to name, place and time.    Skin: No rashes, erythema, pallor, petechia or purpura.          Review of Systems:       E: NEGATIVE for vision changes or irritation  E/M: NEGATIVE for ear, mouth and throat problems  R: NEGATIVE for significant cough or SOB  CV: NEGATIVE for chest pain, palpitations or peripheral edema  GI: NEGATIVE for nausea, abdominal pain, heartburn, or change in bowel habits  : NEGATIVE for frequency, dysuria, or hematuria  M: NEGATIVE for significant arthralgias or myalgia  N: NEGATIVE for weakness, dizziness or paresthesias  H: NEGATIVE for bleeding problems           Lab / Radiology Results:   All laboratory data reviewed          Assessment:   Appropriately NPO  Chief complaint or anatomic assessment of involved area: colonoscopy. History of polyp, father with colon cancer         Plan:   MAC Anesthesia     Risks, benefits, alternatives to procedure explained and consent obtained  P2 (patient with mild systemic disease)  Discharge from Phase 1 and / or Phase 2 recovery when patient meets criteria    I have reviewed the history and physical, lab finding(s), diagnostic data, medications, and the plan for sedation.  I have  determined this patient to be an appropriate candidate for the planned sedation / procedure and have reassessed the patient immediately prior to sedation / procedure.      Mark Blackman MD

## 2018-07-11 NOTE — ANESTHESIA POSTPROCEDURE EVALUATION
Patient: Ruba Farmer    Procedure(s):  colonoscopy - Wound Class: II-Clean Contaminated    Diagnosis:adenomatous polyp of colon, unspecified part of colon    screening  Diagnosis Additional Information: No value filed.    Anesthesia Type:  No value filed.    Note:  Anesthesia Post Evaluation    Patient location during evaluation: Phase 2  Patient participation: Able to fully participate in evaluation  Level of consciousness: awake  Pain management: adequate  Airway patency: patent  Cardiovascular status: acceptable and hemodynamically stable  Respiratory status: acceptable, room air and spontaneous ventilation  Hydration status: acceptable  PONV: none     Anesthetic complications: None          Last vitals:  Vitals:    07/11/18 1029   BP: 146/73   Resp: 18   Temp: 36.8  C (98.2  F)   SpO2: 98%         Electronically Signed By: SANJUANITA Burrell CRNA  July 11, 2018  11:39 AM

## 2018-07-11 NOTE — ANESTHESIA CARE TRANSFER NOTE
Patient: Ruba Farmer    Procedure(s):  colonoscopy - Wound Class: II-Clean Contaminated    Diagnosis: adenomatous polyp of colon, unspecified part of colon    screening  Diagnosis Additional Information: No value filed.    Anesthesia Type:   No value filed.     Note:  Airway :Room Air  Patient transferred to:Phase II  Handoff Report: Identifed the Patient, Identified the Reponsible Provider, Reviewed the pertinent medical history, Discussed the surgical course, Reviewed Intra-OP anesthesia mangement and issues during anesthesia, Set expectations for post-procedure period and Allowed opportunity for questions and acknowledgement of understanding      Vitals: (Last set prior to Anesthesia Care Transfer)    CRNA VITALS  7/11/2018 1138 - 7/11/2018 1208      7/11/2018             Pulse: 74    SpO2: 100 %                Electronically Signed By: SANJUANITA Burrell CRNA  July 11, 2018  12:08 PM

## 2018-07-16 LAB — COPATH REPORT: NORMAL

## 2018-08-14 DIAGNOSIS — Z87.440 HISTORY OF URINARY TRACT INFECTION: ICD-10-CM

## 2018-08-14 RX ORDER — SULFAMETHOXAZOLE/TRIMETHOPRIM 800-160 MG
TABLET ORAL
Qty: 30 TABLET | Refills: 1 | Status: SHIPPED | OUTPATIENT
Start: 2018-08-14 | End: 2020-10-07

## 2018-08-14 NOTE — TELEPHONE ENCOUNTER
Requested Prescriptions   Pending Prescriptions Disp Refills     sulfamethoxazole-trimethoprim (BACTRIM DS/SEPTRA DS) 800-160 MG per tablet 30 tablet 1     Si tab after intercourse    There is no refill protocol information for this order        Last Written Prescription Date:  16  Last Fill Quantity: 30,  # refills: 1   Last office visit: 2018 with prescribing provider:  18   Future Office Visit:

## 2018-11-04 ENCOUNTER — MYC MEDICAL ADVICE (OUTPATIENT)
Dept: FAMILY MEDICINE | Facility: CLINIC | Age: 73
End: 2018-11-04

## 2018-11-14 DIAGNOSIS — R05.9 COUGH: ICD-10-CM

## 2018-11-14 NOTE — TELEPHONE ENCOUNTER
Pt called. States she always has a cough but states she just keeps them on hand. States she just got over a cold now but the cough continues. Non productive. Would like refill. Emerald Bryan RN

## 2018-11-14 NOTE — TELEPHONE ENCOUNTER
Requested Prescriptions   Pending Prescriptions Disp Refills     benzonatate (TESSALON) 100 MG capsule 42 capsule 0     Sig: Take 1 capsule (100 mg) by mouth 3 times daily as needed for cough    There is no refill protocol information for this order        Last Written Prescription Date:  1/19/18  Last Fill Quantity: 42,  # refills: 0   Last office visit: 5/4/2018 with prescribing provider:  Vidya   Future Office Visit:

## 2018-11-15 RX ORDER — BENZONATATE 100 MG/1
100 CAPSULE ORAL 3 TIMES DAILY PRN
Qty: 42 CAPSULE | Refills: 0 | Status: SHIPPED | OUTPATIENT
Start: 2018-11-15 | End: 2022-05-12

## 2018-11-27 ENCOUNTER — ALLIED HEALTH/NURSE VISIT (OUTPATIENT)
Dept: FAMILY MEDICINE | Facility: CLINIC | Age: 73
End: 2018-11-27
Payer: COMMERCIAL

## 2018-11-27 DIAGNOSIS — Z23 NEED FOR PROPHYLACTIC VACCINATION AND INOCULATION AGAINST INFLUENZA: Primary | ICD-10-CM

## 2018-11-27 PROCEDURE — G0008 ADMIN INFLUENZA VIRUS VAC: HCPCS

## 2018-11-27 PROCEDURE — 90662 IIV NO PRSV INCREASED AG IM: CPT

## 2018-11-27 NOTE — MR AVS SNAPSHOT
After Visit Summary   11/27/2018    Ruba Farmer    MRN: [id number]           Patient Information     Date Of Birth          1945        Visit Information        Provider Department      11/27/2018 1:00 PM [place] NB [facility]        Today's Diagnoses     Need for prophylactic vaccination and inoculation against influenza    -  1       Follow-ups after your visit        Who to contact     If you have questions or need follow up information about today's clinic visit or your schedule please contact [facility] directly at [phone].  Normal or non-critical lab and imaging results will be communicated to you by [employer], letter or phone within 4 business days after the clinic has received the results. If you do not hear from us within 7 days, please contact the clinic through [employer] or phone. If you have a critical or abnormal lab result, we will notify you by phone as soon as possible.  Submit refill requests through [employer] or call your pharmacy and they will forward the refill request to us. Please allow 3 business days for your refill to be completed.          Additional Information About Your Visit        MyChart Information     [employer] gives you secure access to your electronic health record. If you see a primary care provider, you can also send messages to your care team and make appointments. If you have questions, please call your primary care clinic.  If you do not have a primary care provider, please call [phone] and they will assist you.        Care EveryWhere ID     This is your Care EveryWhere ID. This could be used by other organizations to access your [facility] medical records  [id number]         Blood Pressure from Last 3 Encounters:   07/11/18 144/75   05/04/18 122/76   07/27/17 128/76    Weight from Last 3 Encounters:   07/11/18 188 lb (85.3 kg)   05/04/18 190 lb 3.2 oz (86.3 kg)   07/27/17 191 lb 12.8 oz (87 kg)              We  Performed the Following     ADMIN INFLUENZA (For MEDICARE Patients ONLY) []     FLU VACCINE, INCREASED ANTIGEN, PRESV FREE, AGE 65+ [26665]        Primary Care Provider Office Phone # Fax #    Xiao Dasilva [phone] [address] [place] [address]        Equal Access to Services     CRISTEL SURESH : Hadii aad ku hadasho Soomaali, waaxda luqadaha, qaybta kaalmada adeegyada, le tellezin hayaan adejinny khnachoelaine laalvina . So [facility] [phone].    ATENCIÓN: Si habla español, tiene a aguilera disposición servicios gratuitos de asistencia lingüística. Llame al [phone].    We comply with applicable federal civil rights laws and [place] laws. We do not discriminate on the basis of race, color, national origin, age, disability, sex, sexual orientation, or gender identity.            Thank you!     Thank you for choosing [facility]  for your care. Our goal is always to provide you with excellent care. Hearing back from our patients is one way we can continue to improve our services. Please take a few minutes to complete the written survey that you may receive in the mail after your visit with us. Thank you!             Your Updated Medication List - Protect others around you: Learn how to safely use, store and throw away your medicines at www.disposemymeds.org.          This list is accurate as of 11/27/18  1:27 PM.  Always use your most recent med list.                   Brand Name Dispense Instructions for use Diagnosis    albuterol 108 (90 Base) MCG/ACT inhaler    PROAIR HFA/PROVENTIL HFA/VENTOLIN HFA    1 Inhaler    Inhale 2 puffs into the lungs every 6 hours as needed for shortness of breath / dyspnea    Chronic cough       ALLEGRA ALLERGY PO      Take by mouth daily        azithromycin 250 MG tablet    ZITHROMAX    6 tablet    Two tablets first day, then one tablet daily for four days.    Bronchiectasis with acute exacerbation (H)       benzonatate 100 MG capsule     TESSALON    42 capsule    Take 1 capsule (100 mg) by mouth 3 times daily as needed for cough    Cough       COCONUT OIL PO      Take 2 capsules by mouth daily        COQ10 PO      Take 1 capsule by mouth daily        Digestive Enzyme Caps           fluticasone 50 MCG/ACT nasal spray    FLONASE    16 g    Spray 1-2 sprays into both nostrils daily    Post-nasal drip       ibuprofen 200 MG tablet    ADVIL/MOTRIN    120    TAKE 1 TO 3 TABLETS EVERY 6 HOURS AS NEEDED WITH FOOD for 7-14 days    Strain of lumbar region       ipratropium 0.06 % nasal spray    ATROVENT    3 Box    Spray 2 sprays into both nostrils 3 times daily as needed for rhinitis    Post-nasal drip       LORazepam 0.5 MG tablet    ATIVAN    20 tablet    Take 1 tablet (0.5 mg) by mouth every 8 hours as needed for anxiety    Anxiety       oseltamivir 75 MG capsule    TAMIFLU    10 capsule    Take 1 capsule (75 mg) by mouth daily    Exposure to influenza       OVER-THE-COUNTER      D-monos takes after SIC to prevent UTIs        POTASSIUM CITRATE PO      Take 1 tablet by mouth daily        PROBIOTIC DAILY PO      Take 1 tablet by mouth daily        SUDAFED 30 MG tablet   Generic drug:  pseudoePHEDrine      Take 30 mg by mouth every 4 hours as needed for congestion        sulfamethoxazole-trimethoprim 800-160 MG tablet    BACTRIM DS/SEPTRA DS    30 tablet    1 tab after intercourse    History of urinary tract infection       Vitamin C 500 MG Caps      Take 500 mg by mouth daily        vitamin D 2000 units Caps      Take 2,000 Units by mouth daily

## 2018-12-26 ENCOUNTER — TELEPHONE (OUTPATIENT)
Dept: FAMILY MEDICINE | Facility: CLINIC | Age: 73
End: 2018-12-26

## 2018-12-26 ENCOUNTER — OFFICE VISIT (OUTPATIENT)
Dept: URGENT CARE | Facility: URGENT CARE | Age: 73
End: 2018-12-26
Payer: COMMERCIAL

## 2018-12-26 ENCOUNTER — ANCILLARY PROCEDURE (OUTPATIENT)
Dept: GENERAL RADIOLOGY | Facility: CLINIC | Age: 73
End: 2018-12-26
Attending: PHYSICIAN ASSISTANT
Payer: COMMERCIAL

## 2018-12-26 VITALS
OXYGEN SATURATION: 99 % | HEART RATE: 79 BPM | TEMPERATURE: 97.7 F | WEIGHT: 197.4 LBS | DIASTOLIC BLOOD PRESSURE: 66 MMHG | SYSTOLIC BLOOD PRESSURE: 130 MMHG | BODY MASS INDEX: 30.92 KG/M2

## 2018-12-26 DIAGNOSIS — R07.89 CHEST PRESSURE: Primary | ICD-10-CM

## 2018-12-26 DIAGNOSIS — R07.89 CHEST PRESSURE: ICD-10-CM

## 2018-12-26 PROCEDURE — 99214 OFFICE O/P EST MOD 30 MIN: CPT | Performed by: PHYSICIAN ASSISTANT

## 2018-12-26 PROCEDURE — 71046 X-RAY EXAM CHEST 2 VIEWS: CPT | Mod: FY

## 2018-12-26 PROCEDURE — 93000 ELECTROCARDIOGRAM COMPLETE: CPT | Performed by: PHYSICIAN ASSISTANT

## 2018-12-26 ASSESSMENT — ENCOUNTER SYMPTOMS
HEMATOLOGIC/LYMPHATIC NEGATIVE: 1
VOMITING: 0
SORE THROAT: 0
EYE PAIN: 0
NEUROLOGICAL NEGATIVE: 1
NAUSEA: 0
FEVER: 0
ALLERGIC/IMMUNOLOGIC NEGATIVE: 1
ARTHRALGIAS: 0
MYALGIAS: 0
FATIGUE: 0
RHINORRHEA: 0
SHORTNESS OF BREATH: 0
COUGH: 0
PSYCHIATRIC NEGATIVE: 1
TROUBLE SWALLOWING: 0
PALPITATIONS: 0
CHILLS: 0
ENDOCRINE NEGATIVE: 1
DIARRHEA: 0
CHEST TIGHTNESS: 0
SINUS PRESSURE: 0
WHEEZING: 0
UNEXPECTED WEIGHT CHANGE: 0
ABDOMINAL PAIN: 0
BACK PAIN: 0
EYE REDNESS: 0

## 2018-12-26 NOTE — TELEPHONE ENCOUNTER
Pt states 2 nights ago she was laying in bed and flet like her heart was pounding. Started to feel chest pressure and then tingling down left arm. Pt took an asa and stated in the morning the symptoms were gone. Currently has no symptoms and hasn't had any since. Pt has appointment with Dr Dasilva but not until the 15th. States she will come to urgent care tonight to be evaluated as well. Emerald Bryan RN

## 2018-12-26 NOTE — TELEPHONE ENCOUNTER
Patient is calling for the nurse. She has some questions related to her heart. Please advise. Does have an appointment in January but has questions now.    Georgie [place] [place]

## 2018-12-26 NOTE — PROGRESS NOTES
SUBJECTIVE:   Ruba Farmer is a 73 year old female presenting with a chief complaint of   Chief Complaint   Patient presents with     Irregular Heart Beat     started 12/24/18 and felt tingling in left arm, had caffeine and is sensitive to it, took baby aspirin the last 2 days, chest pressure- no chest pain, patient states has bronchial issues        She is an established patient of [facility].    Cardiac Event    Symptom Onset: 2 day(s) ago.  Timing of illness: sudden onset.  Current and Associated symptoms: chest pressure/discomfort, palpitations and irregular heart beats.  Character: pressure.  Severity moderate.  Location: left chest.  Radiation: tingling in left arm.  Associated Symptoms: palpitations/skipped beat.  Exacerbated by: nothing.  Relieved by: nothing.  Cardiac risk factors: none.        Review of Systems   Constitutional: Negative for chills, fatigue, fever and unexpected weight change.   HENT: Negative for congestion, ear pain, postnasal drip, rhinorrhea, sinus pressure, sore throat and trouble swallowing.    Eyes: Negative for pain, redness and visual disturbance.   Respiratory: Negative for cough, chest tightness, shortness of breath and wheezing.    Cardiovascular: Negative for chest pain and palpitations.        Chest pressure   Gastrointestinal: Negative for abdominal pain, diarrhea, nausea and vomiting.   Endocrine: Negative.    Genitourinary: Negative.    Musculoskeletal: Negative for arthralgias, back pain and myalgias.   Skin: Negative for rash.   Allergic/Immunologic: Negative.    Neurological: Negative.    Hematological: Negative.    Psychiatric/Behavioral: Negative.        Past Medical History:   Diagnosis Date     Bronchitis     pt reports yearly bronchitis     Diagnostic skin and sensitization tests (aka ALLERGENS) 7/22/15 IgE tests all NEGATIVE for environmental allergens.      Nonallergic rhinitis     7/22/15 IgE tests all NEGATIVE for environmental allergens.      Pneumonia     hx  of several episodes of pneumonia     Recurrent UTI      Family History   Problem Relation Age of Onset     Cancer Mother         endometrail cancer     Heart Disease Mother      Neurologic Disorder Mother         fibromyalgia     Diabetes Father      Cancer Father         pancreatic cancer     Heart Disease Maternal Grandmother      Prostate Cancer Maternal Grandfather      Cancer Paternal Grandfather         lung     Genitourinary Problems Paternal Grandfather         dialysis     Diabetes Brother      Cancer Son      Current Outpatient Medications   Medication Sig Dispense Refill     Ascorbic Acid (VITAMIN C) 500 MG CAPS Take 500 mg by mouth daily        Cholecalciferol (VITAMIN D) 2000 UNITS CAPS Take 2,000 Units by mouth daily        COCONUT OIL PO Take 2 capsules by mouth daily       Coenzyme Q10 (COQ10 PO) Take 1 capsule by mouth daily       Digestive Enzyme CAPS        Fexofenadine HCl (ALLEGRA ALLERGY PO) Take by mouth daily       fluticasone (FLONASE) 50 MCG/ACT spray Spray 1-2 sprays into both nostrils daily 16 g 0     IBUPROFEN 200 MG OR TABS TAKE 1 TO 3 TABLETS EVERY 6 HOURS AS NEEDED WITH FOOD for 7-14 days 120 0     ipratropium (ATROVENT) 0.06 % spray Spray 2 sprays into both nostrils 3 times daily as needed for rhinitis 3 Box 5     LORazepam (ATIVAN) 0.5 MG tablet Take 1 tablet (0.5 mg) by mouth every 8 hours as needed for anxiety 20 tablet 0     POTASSIUM CITRATE PO Take 1 tablet by mouth daily       Probiotic Product (PROBIOTIC DAILY PO) Take 1 tablet by mouth daily       albuterol (PROAIR HFA, PROVENTIL HFA, VENTOLIN HFA) 108 (90 BASE) MCG/ACT inhaler Inhale 2 puffs into the lungs every 6 hours as needed for shortness of breath / dyspnea (Patient not taking: Reported on 12/26/2018) 1 Inhaler 1     azithromycin (ZITHROMAX) 250 MG tablet Two tablets first day, then one tablet daily for four days. (Patient not taking: Reported on 12/26/2018) 6 tablet 11     benzonatate (TESSALON) 100 MG capsule Take 1  capsule (100 mg) by mouth 3 times daily as needed for cough (Patient not taking: Reported on 12/26/2018) 42 capsule 0     oseltamivir (TAMIFLU) 75 MG capsule Take 1 capsule (75 mg) by mouth daily (Patient not taking: Reported on 12/26/2018) 10 capsule 0     OVER-THE-COUNTER D-monos takes after SIC to prevent UTIs       pseudoePHEDrine (SUDAFED) 30 MG tablet Take 30 mg by mouth every 4 hours as needed for congestion       sulfamethoxazole-trimethoprim (BACTRIM DS/SEPTRA DS) 800-160 MG per tablet 1 tab after intercourse (Patient not taking: Reported on 12/26/2018) 30 tablet 1     Social History     Tobacco Use     Smoking status: Never Smoker     Smokeless tobacco: Never Used   Substance Use Topics     Alcohol use: Yes     Comment: 2 daily (wine)       OBJECTIVE  /66   Pulse 79   Temp 97.7  F (36.5  C) (Tympanic)   Wt 89.5 kg (197 lb 6.4 oz)   SpO2 99%   BMI 30.92 kg/m      Physical Exam   Constitutional: She appears well-developed and well-nourished.   HENT:   Head: Normocephalic.   Right Ear: Tympanic membrane and ear canal normal.   Left Ear: Tympanic membrane and ear canal normal.   Mouth/Throat: Oropharynx is clear and moist.   Eyes: Conjunctivae are normal. Pupils are equal, round, and reactive to light.   Neck: Neck supple.   Cardiovascular: Normal rate and normal heart sounds.   Pulmonary/Chest: Effort normal and breath sounds normal.   No reproducible chest wall pain with palpation.    Skin: Skin is warm and dry. No rash noted.   Psychiatric: She has a normal mood and affect. Her behavior is normal.       Labs:  No results found for this or any previous visit (from the past 24 hour(s)).    X-Ray was done, my findings are: no acute infiltrates/consolidation     EKG: no acute findings     ASSESSMENT:      ICD-10-CM    1. Chest pressure R07.89 EKG 12-lead complete w/read - Clinics     XR Chest 2 Views        Medical Decision Making:    Differential Diagnosis:  Cardiac:   Acute MI  Chest wall  Pain  Pleurisy  Angina  GERD  Palpitations  Pneumonia  Panic/Anxiety    Serious Comorbid Conditions:  Adult:  None    PLAN:    Cardiac: Chest x-ray and EKG are within normal limits. Patient is having minimal symptoms at this time, she would like to continue to monitor symptoms. Avoid caffeine. If symptoms change or worsen she will present to the ED to rule out an acute MI    Followup:    If not improving or if conditions worsens over the next 12-24 hours, go to the Emergency Department    There are no Patient Instructions on file for this visit.

## 2019-01-02 ENCOUNTER — MYC MEDICAL ADVICE (OUTPATIENT)
Dept: FAMILY MEDICINE | Facility: CLINIC | Age: 74
End: 2019-01-02

## 2019-01-03 DIAGNOSIS — M54.50 CHRONIC BILATERAL LOW BACK PAIN WITHOUT SCIATICA: Primary | ICD-10-CM

## 2019-01-03 DIAGNOSIS — G89.29 CHRONIC BILATERAL LOW BACK PAIN WITHOUT SCIATICA: Primary | ICD-10-CM

## 2019-01-08 ENCOUNTER — HOSPITAL ENCOUNTER (OUTPATIENT)
Dept: PHYSICAL THERAPY | Facility: CLINIC | Age: 74
Setting detail: THERAPIES SERIES
End: 2019-01-08
Attending: FAMILY MEDICINE
Payer: MEDICARE

## 2019-01-08 DIAGNOSIS — G89.29 CHRONIC BILATERAL LOW BACK PAIN WITHOUT SCIATICA: ICD-10-CM

## 2019-01-08 DIAGNOSIS — M54.50 CHRONIC BILATERAL LOW BACK PAIN WITHOUT SCIATICA: ICD-10-CM

## 2019-01-08 PROCEDURE — 97161 PT EVAL LOW COMPLEX 20 MIN: CPT | Mod: GP | Performed by: PHYSICAL THERAPIST

## 2019-01-08 PROCEDURE — 97110 THERAPEUTIC EXERCISES: CPT | Mod: GP | Performed by: PHYSICAL THERAPIST

## 2019-01-08 NOTE — ADDENDUM NOTE
Encounter addended by: Myrna Nascimento, PT on: 1/8/2019 10:45 AM   Actions taken: Sign clinical note, Flowsheet accepted

## 2019-01-08 NOTE — PROGRESS NOTES
01/08/19 1000   General Information   Type of Visit Initial OP Ortho PT Evaluation   Start of Care Date 01/08/19   Referring Physician Xiao Dasilva MD   Orders Evaluate and Treat   Date of Order 01/03/19   Insurance Type Medicare;Other   Insurance Comments/Visits Authorized Medicare; [employer]   Medical Diagnosis Chronic bilateral low back pain without sciatica   Surgical/Medical history reviewed Yes   Precautions/Limitations no known precautions/limitations   Body Part(s)   Body Part(s) Lumbar Spine/SI   Presentation and Etiology   Pertinent history of current problem (include personal factors and/or comorbidities that impact the POC) Patient reports: previous session of PT helped to resolve the left leg sciatic N pain.  She is continuing with her HEP.  However, the left spine pain continues to bother.   Impairments A. Pain;E. Decreased flexibility;F. Decreased strength and endurance   Functional Limitations perform activities of daily living;perform required work activities;perform desired leisure / sports activities   Symptom Location Left L4-5 just lateral to spine   How/Where did it occur From insidious onset   Onset date of current episode/exacerbation 07/08/18   Chronicity Chronic   Pain rating (0-10 point scale) Best (/10);Worst (/10)   Best (/10) 2   Worst (/10) 7   Pain quality A. Sharp   Frequency of pain/symptoms A. Constant   Pain/symptoms are: The same all the time   Pain/symptoms exacerbated by A. Sitting;M. Other   Pain exacerbation comment Driving   Pain/symptoms eased by B. Walking   Progression of symptoms since onset: Unchanged   Prior Level of Function   Prior Level of Function-Mobility Independent   Prior Level of Function-ADLs Independent   Current Level of Function   Current Community Support Family/friend caregiver   Patient role/employment history F. Retired   Living environment [place]   Fall Risk Screen   Fall screen completed by PT   Have you fallen 2 or more times in the  "past year? No   Have you fallen and had an injury in the past year? No   Is patient a fall risk? No   System Outcome Measures   Outcome Measures Low Back Pain (see Oswestry and Kehinde)   Lumbar Spine/SI Objective Findings   Posture Jovani rounded shoulders; increased thoracic kyphosis; hyper lumbar lordosis with hinge at L4-5   Flexion ROM Fingertips to patella pain free, needing to \"walk\" back to extension with UE   Extension ROM 50% with hinge L4-5   Right Side Bending ROM Fingertips to patellar pain free   Left Side Bending ROM Fingertips to patella pain free   Hip Flexion (L2) Strength 5/5   Hip Abduction Strength 4/5   Hip Extension Strength 4/5   Knee Extension (L3) Strength 5/5   Ankle Dorsiflexion (L4) Strength 55   Great Toe Extension (L5) Strength 5/5   Ankle Plantar Flexion (S1) Strength 5/5   Hip Flexor Flexibility Limited: 5 degrees of hip extension before lumbar extn compensation   Planned Therapy Interventions   Planned Therapy Interventions ADL retraining;balance training;gait training;joint mobilization;motor coordination training;manual therapy;neuromuscular re-education;ROM;strengthening;stretching   Planned Modality Interventions   Planned Modality Interventions Cryotherapy   Clinical Impression   Criteria for Skilled Therapeutic Interventions Met yes, treatment indicated   PT Diagnosis Chronic LBP related to core weakness; hypertonic hip flexors and limited hip extension   Influenced by the following impairments Pain; weakness; impaired ROM and posture   Functional limitations due to impairments Pain and difficulty performing ADL's   Clinical Presentation Stable/Uncomplicated   Clinical Presentation Rationale (+) motivation; previous success with PT; overall health  (-) chronicity; recurrent nature   Clinical Decision Making (Complexity) Low complexity   Therapy Frequency other (see comments)   Predicted Duration of Therapy Intervention (days/wks) 1x every other week for 8 weeks   Risk & Benefits of " therapy have been explained Yes   Patient, Family & other staff in agreement with plan of care Yes   Clinical Impression Comments Marcy arrives today with chronic recurrent low back pain; she will benefit from skilled PT.   Education Assessment   Preferred Learning Style Listening;Demonstration;Pictures/video   Barriers to Learning No barriers   ORTHO GOALS   PT Ortho Eval Goals 1;2;3   Ortho Goal 1   Goal Description Patient will be independent with her HEP to reduce future occurrence of pain and disability.   Target Date 01/08/19   Ortho Goal 2   Goal Description Patient will have >=10 degrees of hip extension to reduce lumbar extn compensation with gait and ADL's.   Target Date 03/05/19   Ortho Goal 3   Goal Description Patient will be able to drive >=2 hours with <2/10 low back pain.   Target Date 02/19/19   Total Evaluation Time   PT Eval, Low Complexity Minutes ([id number]) 20   Therapy Certification   Certification date from 01/08/19   Certification date to 03/05/19   Medical Diagnosis Chronic bilateral low back pain without sciatica       Myrna Nascimento, PT, DPT  Doctor of Physical Therapy #[id number]  [facility]  [phone]  [email or web address]

## 2019-01-08 NOTE — ADDENDUM NOTE
Encounter addended by: Myrna Nascimento, PT on: 1/8/2019 10:49 AM   Actions taken: Sign clinical note, Flowsheet accepted, Charge Capture section accepted

## 2019-01-08 NOTE — PROGRESS NOTES
Outpatient Physical Therapy Discharge Note     Patient: Ruba Farmer  : 1945    Beginning/End Dates of Reporting Period:  18 to 2019    Referring Provider: Dr. Xiao Dasilva MD    Therapy Diagnosis: Chronic left sided low back pain with left sided sciatica     Client Self Report: The low back and sciatic nerve pain has improved (is less intense).  Noticing mid/lower back spasms with prolonged car rides or while sleeping.  This is a more recent pain.    Objective Measurements:  Objective Measure: Thoracic mobility  Details: Limited bilateral rotation 45 degrees right; 30 degrees left  Objective Measure: Time able to car ride before LBP  Details: <=1 hour          Goals:  Goal Identifier     Goal Description Patient will have >=4+/5 left hip ABD strength to support the lumbar spine with ADL's.   Target Date 18   Date Met      Progress:     Goal Identifier     Goal Description Patient will be able to drive for >=4 hours without difficulty.   Target Date 18   Date Met      Progress:     Goal Identifier     Goal Description Patient will be independent with her HEP to reduce future occurrence of pain and disability.   Target Date 18   Date Met  18   Progress:       Progress Toward Goals:   Progress this reporting period: Marcy attended 2 PT sessions to be educated in an appropriate HEP to address her chronic LBP and sciatica.  She made improvements in pain relief, sciatica relief, and lumbopelvic strength.      Plan:  Discharge from therapy.    Discharge:    Reason for Discharge: Patient is independent with HEP    Equipment Issued: Theraband    Discharge Plan: Patient to continue home program.    Myrna Nascimento, PT, DPT  Doctor of Physical Therapy #[id number]  [facility]  [phone]  [email or web address]

## 2019-01-08 NOTE — PROGRESS NOTES
This note also relates to the following rows which could not be included:  RETIRED Total Evaluation Time - Cannot attach notes to rows marked as read only     05/16/18 1100   General Information   Type of Visit Initial OP Ortho PT Evaluation   Start of Care Date 05/16/18   Referring Physician Xiao Ivan MD   Patient/Family Goals Statement To learn HEP for back pain   Orders Evaluate and Treat   Date of Order 05/04/18   Insurance Type Medicare;Other   Insurance Comments/Visits Authorized Medicare; [employer] ($2010 remaining in PT/SLP cap)   Medical Diagnosis Chronic left-sided low back pain with left-sided sciatica   Surgical/Medical history reviewed Yes   Precautions/Limitations no known precautions/limitations       Present No   Body Part(s)   Body Part(s) Lumbar Spine/SI   Presentation and Etiology   Pertinent history of current problem (include personal factors and/or comorbidities that impact the POC) Patient reports: over the past two months she has been feeling left sciatic nerve pain.  Pain is in the butt and posterior thigh region.     Impairments A. Pain;E. Decreased flexibility;D. Decreased ROM   Functional Limitations perform activities of daily living;perform required work activities;perform desired leisure / sports activities   Symptom Location Left SI joint; posterior hip and posterior thigh left   How/Where did it occur From insidious onset   Onset date of current episode/exacerbation 03/16/18   Chronicity Recurrent   Pain rating (0-10 point scale) Best (/10);Worst (/10)   Best (/10) 0   Worst (/10) 5   Pain quality B. Dull   Frequency of pain/symptoms B. Intermittent   Pain/symptoms are: The same all the time   Pain/symptoms exacerbated by A. Sitting;G. Certain positions;K. Home tasks;L. Work tasks;I. Bending   Pain/symptoms eased by E. Changing positions   Progression of symptoms since onset: Unchanged   Prior Level of Function   Prior Level of Function-Mobility  Independent   Prior Level of Function-ADLs Independent   Current Level of Function   Current Community Support Family/friend caregiver   Patient role/employment history F. Retired   [facility]   Fall Risk Screen   Fall screen completed by PT   Have you fallen 2 or more times in the past year? No   Have you fallen and had an injury in the past year? No   Is patient a fall risk? No   System Outcome Measures   Outcome Measures Low Back Pain (see Oswestry and Kehinde)   Lumbar Spine/SI Objective Findings   Flexion ROM 50% increased left stretch   Extension ROM 30% increased left LB pain   Hip Abduction Strength Right=4/5; Left=3+/5   Hip Extension Strength Jovani=4/5   Pelvic Screen Hip flexion right=120 deg; Left=90 deg with increased left posterior hip pain   Posture Left hemipelvic anterior tilt   Lumbar/Hip/Knee/Foot Strength Comments No myotomal weakness   Planned Therapy Interventions   Planned Therapy Interventions ADL retraining;balance training;joint mobilization;manual therapy;motor coordination training;neuromuscular re-education;ROM;strengthening;stretching   Planned Modality Interventions   Planned Modality Interventions Cryotherapy;Electrical stimulation   Clinical Impression   Criteria for Skilled Therapeutic Interventions Met yes, treatment indicated   PT Diagnosis Chronic-recurrent low back pain; left sciatic pain   Influenced by the following impairments Pain; weakness; impaired ROM; impaired posture   Functional limitations due to impairments Pain and difficulty with prolonged sitting   Clinical Presentation Stable/Uncomplicated   Clinical Presentation Rationale (+) motivation; previous success with PT; desire to perform HEP independently   (-) chronicity   Clinical Decision Making (Complexity) Low complexity   Therapy Frequency other (see comments)   Predicted Duration of Therapy Intervention (days/wks) Pt to return only if needed; anticipate 1-3 visits over 12 weeks   Risk &  Benefits of therapy have been explained Yes   Patient, Family & other staff in agreement with plan of care Yes   Clinical Impression Comments Marcy is a pleasant 73 yo female who presents today with recurrent low back pain and left sciatic N symptoms.  She will benefit from minimal skilled PT to educate in an appropriate HEP to address symptoms.'   Education Assessment   Preferred Learning Style Listening;Demonstration;Pictures/video   Barriers to Learning No barriers   ORTHO GOALS   PT Ortho Eval Goals 1;2;3   Ortho Goal 1   Goal Description Patient will have >=4+/5 left hip ABD strength to support the lumbar spine with ADL's.   Target Date 07/11/18   Ortho Goal 2   Goal Description Patient will be able to drive for >=4 hours without difficulty.   Target Date 08/08/18   Ortho Goal 3   Goal Description Patient will be independent with her HEP to reduce future occurrence of pain and disability.   Target Date 05/16/18   Date Met 05/16/18   Therapy Certification   Certification date from 05/16/18   Certification date to 08/08/18   Medical Diagnosis Chronic left-sided low back pain with left-sided sciatica       Myrna Nascimento, PT, DPT  Doctor of Physical Therapy #[id number]  [facility]  [phone]  [email or web address]

## 2019-01-08 NOTE — PROGRESS NOTES
[facility]          OUTPATIENT PHYSICAL THERAPY ORTHOPEDIC EVALUATION  PLAN OF TREATMENT FOR OUTPATIENT REHABILITATION  (COMPLETE FOR INITIAL CLAIMS ONLY)  Patient's Last Name, First Name, M.I.  YOB: 1945  Ruba Farmer    Provider s Name:  [facility]   Medical Record No.  [id number]   Start of Care Date:  01/08/19   Onset Date:  07/08/18   Type:     _X__PT   ___OT   ___SLP Medical Diagnosis:  Chronic bilateral low back pain without sciatica     PT Diagnosis:  Chronic LBP related to core weakness; hypertonic hip flexors and limited hip extension   Visits from SOC:  1      _________________________________________________________________________________  Plan of Treatment/Functional Goals:  ADL retraining, balance training, gait training, joint mobilization, motor coordination training, manual therapy, neuromuscular re-education, ROM, strengthening, stretching     Cryotherapy     Goals     Goal Description: Patient will be independent with her HEP to reduce future occurrence of pain and disability.  Target Date: 01/08/19       Goal Description: Patient will have >=10 degrees of hip extension to reduce lumbar extn compensation with gait and ADL's.  Target Date: 03/05/19       Goal Description: Patient will be able to drive >=2 hours with <2/10 low back pain.  Target Date: 02/19/19                                                           Therapy Frequency:  other (see comments)  Predicted Duration of Therapy Intervention:  1x every other week for 8 weeks    Myrna Nascimento, PT                 I CERTIFY THE NEED FOR THESE SERVICES FURNISHED UNDER        THIS PLAN OF TREATMENT AND WHILE UNDER MY CARE     (Physician co-signature of this document indicates review and certification of the therapy plan).                       Certification Date From:  01/08/19   Certification Date To:  03/05/19    Referring Provider:  Xiao Dasilva MD    Initial  Assessment        See Epic Evaluation Start of Care Date: 01/08/19

## 2019-01-15 ENCOUNTER — OFFICE VISIT (OUTPATIENT)
Dept: FAMILY MEDICINE | Facility: CLINIC | Age: 74
End: 2019-01-15
Payer: MEDICARE

## 2019-01-15 VITALS
WEIGHT: 195.8 LBS | OXYGEN SATURATION: 96 % | HEIGHT: 67 IN | DIASTOLIC BLOOD PRESSURE: 74 MMHG | BODY MASS INDEX: 30.73 KG/M2 | HEART RATE: 69 BPM | TEMPERATURE: 98.8 F | SYSTOLIC BLOOD PRESSURE: 132 MMHG

## 2019-01-15 DIAGNOSIS — S39.012D STRAIN OF LUMBAR REGION, SUBSEQUENT ENCOUNTER: ICD-10-CM

## 2019-01-15 DIAGNOSIS — J45.30 MILD PERSISTENT ASTHMA WITHOUT COMPLICATION: ICD-10-CM

## 2019-01-15 DIAGNOSIS — L82.0 INFLAMED SEBORRHEIC KERATOSIS: ICD-10-CM

## 2019-01-15 DIAGNOSIS — R07.89 ATYPICAL CHEST PAIN: Primary | ICD-10-CM

## 2019-01-15 PROBLEM — S39.012A STRAIN OF LUMBAR REGION: Status: ACTIVE | Noted: 2019-01-15

## 2019-01-15 LAB
CHOLEST SERPL-MCNC: 211 MG/DL
HDLC SERPL-MCNC: 78 MG/DL
LDLC SERPL CALC-MCNC: 117 MG/DL
NONHDLC SERPL-MCNC: 133 MG/DL
TRIGL SERPL-MCNC: 78 MG/DL

## 2019-01-15 PROCEDURE — 80061 LIPID PANEL: CPT | Performed by: FAMILY MEDICINE

## 2019-01-15 PROCEDURE — 99214 OFFICE O/P EST MOD 30 MIN: CPT | Mod: 25 | Performed by: FAMILY MEDICINE

## 2019-01-15 PROCEDURE — 17110 DESTRUCTION B9 LES UP TO 14: CPT | Performed by: FAMILY MEDICINE

## 2019-01-15 PROCEDURE — 36415 COLL VENOUS BLD VENIPUNCTURE: CPT | Performed by: FAMILY MEDICINE

## 2019-01-15 RX ORDER — IBUPROFEN 200 MG
TABLET ORAL
Qty: 120 TABLET | Refills: 0 | COMMUNITY
Start: 2019-01-15

## 2019-01-15 ASSESSMENT — MIFFLIN-ST. JEOR: SCORE: 1417.83

## 2019-01-15 NOTE — PATIENT INSTRUCTIONS
[facility] Schedule    [facility] ~ [phone]  One Day weekly- Alternating Days    [place] ~ [phone]  Every other Monday or Wednesday   & one Saturday morning a month    [place] ~ [phone]  Every Other Monday Afternoon    ~ [place] ~   Every other Monday Morning    [place] ~ [phone]  Every Monday morning  Every Tuesday afternoon  Wed, Thurs, Friday morning & afternoon  Updated 06/18/18        Set up stress test     Cholesterol today     Lesions frozen today

## 2019-01-15 NOTE — PROGRESS NOTES
SUBJECTIVE:   Ruba Farmer is a 73 year old female who presents to clinic today for the following health issues:      Asthma Follow-Up    Was ACT completed today?    Yes    ACT Total Scores 5/4/2018   ACT TOTAL SCORE -   ASTHMA ER VISITS -   ASTHMA HOSPITALIZATIONS -   ACT TOTAL SCORE (Goal Greater than or Equal to 20) 23   In the past 12 months, how many times did you visit the emergency room for your asthma without being admitted to the hospital? 0   In the past 12 months, how many times were you hospitalized overnight because of your asthma? 0       Recent asthma triggers that patient is dealing with: exercise or sports and cold air        Amount of exercise or physical activity: 4-5 days/week for an average of 30-45 minutes    Problems taking medications regularly: No    Medication side effects: none    Diet: regular (no restrictions)          Chest Pain      Onset: 1 EPISODE ON  12/24/2018    Description (location/character/radiation/duration): chest pressure    Left arm aching for 1-2 hours on xmas magy at rest     No further sxs since that time      No other sxs with this     Was seen in  and normal EKG    She has no risk but is very concerned about this being a cardiac   Was seen in Urgent Care on 12/26/2018   mg with MI   No dm   No cigs  No HTN   ?lipids     Problem list and histories reviewed & adjusted, as indicated.  Additional history: as documented    Labs reviewed in EPIC    Reviewed and updated as needed this visit by clinical staff  Tobacco  Allergies  Meds  Problems  Med Hx  Surg Hx  Fam Hx  Soc Hx        Reviewed and updated as needed this visit by Provider  Tobacco  Allergies  Meds  Problems  Med Hx  Surg Hx  Fam Hx         ROS:  Constitutional, HEENT, cardiovascular, pulmonary, gi and gu systems are negative, except as otherwise noted.    OBJECTIVE:                                                    /74 (BP Location: Right arm, Patient Position: Chair, Cuff Size: Adult  "Large)   Pulse 69   Temp 98.8  F (37.1  C) (Tympanic)   Ht 1.689 m (5' 6.5\")   Wt 88.8 kg (195 lb 12.8 oz)   SpO2 96%   BMI 31.13 kg/m     Body mass index is 31.13 kg/m .  GENERAL APPEARANCE: healthy, alert and no distress  NECK: no adenopathy, no asymmetry, masses, or scars and thyroid normal to palpation  RESP: lungs clear to auscultation - no rales, rhonchi or wheezes  CV: regular rates and rhythm, normal S1 S2, no S3 or S4 and no murmur, click or rub  MS: extremities normal- no gross deformities noted  SKIN: seb keratosis mid back and left check both   Treated with cryotherapy in freeze thaw cycle x2   PSYCH: mentation appears normal and affect normal/bright         ASSESSMENT/PLAN:                                                    1. Atypical chest pain    - NM Lexiscan stress test; Future  - Lipid panel reflex to direct LDL Fasting    2. Strain of lumbar region  Continue PT   - ibuprofen (ADVIL/MOTRIN) 200 MG tablet; 1-3 tablets as needed with food  Dispense: 120 tablet; Refill: 0    3. Mild persistent asthma without complication  Stable no change in treatment plan.     4. Inflamed seborrheic keratosis    - DESTRUCT BENIGN LESION, UP TO 14      Patient Instructions   [facility] ~ [phone]  One Day weekly- Alternating Days    [place] ~ [phone]  Every other Monday or Wednesday   & one Saturday morning a month    [place] ~ [phone]  Every Other Monday Afternoon    ~ [place] ~   Every other Monday Morning    [place] ~ [phone]  Every Monday morning  Every Tuesday afternoon  Wed, Thurs, Friday morning & afternoon  Updated 06/18/18        Set up stress test     Cholesterol today     Lesions frozen today         Risks, benefits, side effects and rationale for treatment plan fully discussed with the patient and understanding expressed.   Xiao Dasilva MD  [facility]  "

## 2019-01-15 NOTE — NURSING NOTE
"Chief Complaint   Patient presents with     Asthma       Initial /74 (BP Location: Right arm, Patient Position: Chair, Cuff Size: Adult Large)   Pulse 69   Temp 98.8  F (37.1  C) (Tympanic)   Ht 1.689 m (5' 6.5\")   Wt 88.8 kg (195 lb 12.8 oz)   SpO2 96%   BMI 31.13 kg/m   Estimated body mass index is 31.13 kg/m  as calculated from the following:    Height as of this encounter: 1.689 m (5' 6.5\").    Weight as of this encounter: 88.8 kg (195 lb 12.8 oz).    Patient presents to the clinic using No DME    Health Maintenance that is potentially due pending provider review:  Mammogram    Gave pt phone number/pended order to schedule mammo and/or colonoscopy(or FIT)    Is there anyone who you would like to be able to receive your results? No  If yes have patient fill out TUNG    "

## 2019-01-16 ASSESSMENT — ASTHMA QUESTIONNAIRES: ACT_TOTALSCORE: 21

## 2019-01-24 ENCOUNTER — TELEPHONE (OUTPATIENT)
Dept: FAMILY MEDICINE | Facility: CLINIC | Age: 74
End: 2019-01-24

## 2019-01-24 DIAGNOSIS — R07.9 CHEST PAIN: Primary | ICD-10-CM

## 2019-01-24 NOTE — TELEPHONE ENCOUNTER
Due to claustrophobia pt can't complete nicholas scan stress test. Stress echo needs to be ordered per recommendation by cardiology diagnostics. Test ordered.  Emerald Bryan RN

## 2019-01-29 ENCOUNTER — HOSPITAL ENCOUNTER (OUTPATIENT)
Dept: CARDIOLOGY | Facility: CLINIC | Age: 74
Discharge: HOME | End: 2019-01-29
Attending: INTERNAL MEDICINE | Admitting: INTERNAL MEDICINE
Payer: MEDICARE

## 2019-01-29 DIAGNOSIS — R07.9 CHEST PAIN: ICD-10-CM

## 2019-01-29 PROCEDURE — 40000264 ECHO STRESS ECHOCARDIOGRAM

## 2019-01-29 PROCEDURE — 93016 CV STRESS TEST SUPVJ ONLY: CPT | Performed by: INTERNAL MEDICINE

## 2019-01-29 PROCEDURE — 93325 DOPPLER ECHO COLOR FLOW MAPG: CPT | Mod: 26 | Performed by: INTERNAL MEDICINE

## 2019-01-29 PROCEDURE — 93350 STRESS TTE ONLY: CPT | Mod: 26 | Performed by: INTERNAL MEDICINE

## 2019-01-29 PROCEDURE — 93321 DOPPLER ECHO F-UP/LMTD STD: CPT | Mod: 26 | Performed by: INTERNAL MEDICINE

## 2019-01-29 PROCEDURE — 25500064 ZZH RX 255 OP 636: Performed by: INTERNAL MEDICINE

## 2019-01-29 PROCEDURE — 93018 CV STRESS TEST I&R ONLY: CPT | Performed by: FAMILY MEDICINE

## 2019-01-29 RX ADMIN — HUMAN ALBUMIN MICROSPHERES AND PERFLUTREN 1 ML: 10; .22 INJECTION, SOLUTION INTRAVENOUS at 09:10

## 2019-02-18 ENCOUNTER — ANCILLARY PROCEDURE (OUTPATIENT)
Dept: MAMMOGRAPHY | Facility: CLINIC | Age: 74
End: 2019-02-18
Payer: MEDICARE

## 2019-02-18 DIAGNOSIS — Z12.31 VISIT FOR SCREENING MAMMOGRAM: ICD-10-CM

## 2019-02-18 PROCEDURE — 77067 SCR MAMMO BI INCL CAD: CPT | Mod: TC

## 2019-06-05 NOTE — ADDENDUM NOTE
Encounter addended by: Myrna Nascimento, PT on: 6/5/2019 2:26 PM   Actions taken: Sign clinical note, Episode resolved

## 2019-06-05 NOTE — PROGRESS NOTES
Outpatient Physical Therapy Discharge Note     Patient: Ruba Farmer  : 1945    Beginning/End Dates of Reporting Period:  19 to 2019    Referring Provider: Xiao Dasilva MD    Therapy Diagnosis: Chronic danni LBP     Client Self Report: Patient reports: previous session of PT helped to resolve the left leg sciatic N pain.  She is continuing with her HEP.  However, the left spine pain continues to bother.    Objective Measurements:  Objective Measure: JUANY  Details: 20% disability  Objective Measure: Kehinde  Details: 1, 3 low risk            Goals:  Goal Identifier     Goal Description Patient will be independent with her HEP to reduce future occurrence of pain and disability.   Target Date 19   Date Met      Progress:     Goal Identifier     Goal Description Patient will have >=10 degrees of hip extension to reduce lumbar extn compensation with gait and ADL's.   Target Date 19   Date Met      Progress:     Goal Identifier     Goal Description Patient will be able to drive >=2 hours with <2/10 low back pain.   Target Date 19   Date Met      Progress:       Progress Toward Goals:   Progress this reporting period: Patient attended 1 PT session to address her chronic LBP.  She was educated in an appropriate HEP and did not require a follow up visit.      Plan:  Discharge from therapy.    Discharge:    Reason for Discharge: No further expectation of progress.  Patient has failed to schedule further appointments.    Equipment Issued: Theraband    Discharge Plan: Patient to continue home program.      Myrna Nascimento, PT, DPT  Doctor of Physical Therapy #[id number]  [facility]  [phone]  [email or web address]

## 2019-10-11 ENCOUNTER — IMMUNIZATION (OUTPATIENT)
Dept: FAMILY MEDICINE | Facility: CLINIC | Age: 74
End: 2019-10-11
Payer: MEDICARE

## 2019-10-11 PROCEDURE — 90662 IIV NO PRSV INCREASED AG IM: CPT

## 2019-10-11 PROCEDURE — G0008 ADMIN INFLUENZA VIRUS VAC: HCPCS

## 2019-11-07 ENCOUNTER — HEALTH MAINTENANCE LETTER (OUTPATIENT)
Age: 74
End: 2019-11-07

## 2019-12-16 ENCOUNTER — MYC REFILL (OUTPATIENT)
Dept: FAMILY MEDICINE | Facility: CLINIC | Age: 74
End: 2019-12-16

## 2019-12-16 DIAGNOSIS — J47.1 BRONCHIECTASIS WITH ACUTE EXACERBATION (H): ICD-10-CM

## 2019-12-17 ENCOUNTER — MYC MEDICAL ADVICE (OUTPATIENT)
Dept: FAMILY MEDICINE | Facility: CLINIC | Age: 74
End: 2019-12-17

## 2019-12-17 RX ORDER — AZITHROMYCIN 250 MG/1
TABLET, FILM COATED ORAL
Qty: 6 TABLET | Refills: 0 | Status: SHIPPED | OUTPATIENT
Start: 2019-12-17 | End: 2020-06-05

## 2019-12-17 NOTE — TELEPHONE ENCOUNTER
Requested Prescriptions   Pending Prescriptions Disp Refills     azithromycin (ZITHROMAX) 250 MG tablet 6 tablet 11     Sig: Two tablets first day, then one tablet daily for four days.       There is no refill protocol information for this order              Last Written Prescription Date:  5/4/18  Last Fill Quantity: 6,   # refills: 11  Last Office Visit: 1/15/19  REHDER  Future Office visit:       Routing refill request to provider for review/approval because:  Drug not on the [facility] or [facility] refill protocol or controlled substance

## 2019-12-17 NOTE — TELEPHONE ENCOUNTER
Routing refill request to provider for review/approval because:  Drug not on the [facility] refill protocol  MyChart message sent to pt to schedule Physical appt in Jan since her last OV was 1/15/19.     Patient comment: I recently used azithromycin for bronchial infection.  Although you had 11 refills, I think I only used 2.  It has worked to keep one handy to stop it before it gets too bad. Can you give me a new refill?      Gabi CAMPO RN

## 2020-01-09 ENCOUNTER — TELEPHONE (OUTPATIENT)
Dept: OTOLARYNGOLOGY | Facility: CLINIC | Age: 75
End: 2020-01-09

## 2020-01-09 ENCOUNTER — NURSE TRIAGE (OUTPATIENT)
Dept: FAMILY MEDICINE | Facility: CLINIC | Age: 75
End: 2020-01-09

## 2020-01-09 ENCOUNTER — HOSPITAL ENCOUNTER (EMERGENCY)
Facility: CLINIC | Age: 75
Discharge: HOME | End: 2020-01-09
Attending: NURSE PRACTITIONER | Admitting: NURSE PRACTITIONER
Payer: MEDICARE

## 2020-01-09 VITALS
HEIGHT: 68 IN | DIASTOLIC BLOOD PRESSURE: 80 MMHG | WEIGHT: 190 LBS | OXYGEN SATURATION: 97 % | TEMPERATURE: 98.2 F | SYSTOLIC BLOOD PRESSURE: 156 MMHG | BODY MASS INDEX: 28.79 KG/M2 | HEART RATE: 78 BPM

## 2020-01-09 DIAGNOSIS — H69.90 ETD (EUSTACHIAN TUBE DYSFUNCTION): ICD-10-CM

## 2020-01-09 DIAGNOSIS — J01.90 ACUTE SINUSITIS: ICD-10-CM

## 2020-01-09 PROCEDURE — 99214 OFFICE O/P EST MOD 30 MIN: CPT | Mod: Z6 | Performed by: NURSE PRACTITIONER

## 2020-01-09 PROCEDURE — G0463 HOSPITAL OUTPT CLINIC VISIT: HCPCS | Performed by: NURSE PRACTITIONER

## 2020-01-09 RX ORDER — PREDNISONE 20 MG/1
40 TABLET ORAL DAILY
Qty: 14 TABLET | Refills: 0 | Status: SHIPPED | OUTPATIENT
Start: 2020-01-09 | End: 2020-02-10

## 2020-01-09 ASSESSMENT — MIFFLIN-ST. JEOR: SCORE: 1410.33

## 2020-01-09 NOTE — TELEPHONE ENCOUNTER
Pt says she has sudden hearing loss. Woke up yesterday with this. She said she has been reading about this and it says this is a medical emergency.

## 2020-01-09 NOTE — TELEPHONE ENCOUNTER
Reason for Call:  Other appointment    Detailed comments: pt calling stating she had sudden hearing loss Left ear since yesterday. It is getting worse. Would like to get in for an appt. She called [facility] who told her to go into the ER. She is going to go there now.     Phone Number Patient can be reached at: Cell number on file:    Telephone Information:   Mobile [phone]       Best Time: any    Can we leave a detailed message on this number? YES    Call taken on 1/9/2020 at 8:35 AM by Cassie Argueta

## 2020-01-09 NOTE — ED PROVIDER NOTES
History     Chief Complaint   Patient presents with     Ear Fullness     HPI    SUBJECTIVE: Ruba Farmer  is here today because of decreased hearing and plugged ear sensation  The patient has had symptoms of earache and nasal congestion/runny nose.   Onset of symptoms was 1 day ago. Course of illness is worsening.  Patient denies exposure to illness at home or work/school.   Patient denies fever, vomiting, diarrhea, headache, sinus pain, chest congestion, wheezing, myalgias, mattering conjunctivitis , decreased appetite and decreased activity  Treatment measures tried include nothing  Patient states that she has been googling her symptoms and is concerned that she has cerumen impaction versus a tumor leading to death versus a virus that leads to permanent hearing loss.    Patient does admit to history of chronic sinus inflammation and reports that her doctor has advised her that she needs to be on Mucinex and Allegra daily to decrease her congestion and loosen the secretions.  Patient states she has been doing this..    Allergies:  Allergies   Allergen Reactions     Macrobid [Nitrofuran Derivatives] Shortness Of Breath and Other (See Comments)     High fever     Codeine Other (See Comments) and Nausea and Vomiting     headache       Problem List:    Patient Active Problem List    Diagnosis Date Noted     Strain of lumbar region 01/15/2019     Priority: Medium     Bronchiectasis with acute exacerbation (H) 04/23/2018     Priority: Medium     Mild persistent asthma without complication 02/20/2017     Priority: Medium     Nonallergic rhinitis      Priority: Medium     7/22/15 IgE tests all NEGATIVE for environmental allergens.        Diagnostic skin and sensitization tests (aka ALLERGENS)      Priority: Medium     Recurrent UTI 01/02/2014     Priority: Medium     Senile nuclear sclerosis 06/17/2013     Priority: Medium     Polyp of colon, adenomatous 02/11/2013     Priority: Medium     Colonoscopy 2/2013- rescope  in 5 years due to tubular adenoma       Advanced directives, counseling/discussion 05/17/2012     Priority: Medium     Patient states has Advance Directive and will bring in a copy to clinic. 5/17/2012        Allison Rao, SANJUANITA CNP  01/09/20 1443         GERD (gastroesophageal reflux disease) 07/08/2011     Priority: Medium     CARDIOVASCULAR SCREENING; LDL GOAL LESS THAN 160 10/31/2010     Priority: Medium     Anxiety 05/08/2009     Priority: Medium     Has some anxiety episodes occasionally, mostly at night.  Uses lorazepam 0.25mg rarely.  20 tabs will last 3-4 months.           Past Medical History:    Past Medical History:   Diagnosis Date     Bronchitis      Diagnostic skin and sensitization tests (aka ALLERGENS) 7/22/15 IgE tests all NEGATIVE for environmental allergens.      Nonallergic rhinitis      Pneumonia      Recurrent UTI        Past Surgical History:    Past Surgical History:   Procedure Laterality Date     ARTHROSCOPY SHOULDER DECOMPRESSION Right 3/2/2017    Procedure: ARTHROSCOPY SHOULDER DECOMPRESSION;  Surgeon: Dallas Rnee MD;  Location: [facility] OR     COLONOSCOPY  4/6/2007     HYSTERECTOMY, VAGINAL  1998     PHACOEMULSIFICATION WITH STANDARD INTRAOCULAR LENS IMPLANT  6/17/2013    Procedure: PHACOEMULSIFICATION WITH STANDARD INTRAOCULAR LENS IMPLANT;  Left Kelman phacoemulsification with intraocular lens implant;  Surgeon: Alverto Tijerina MD;  Location: [facility] OR       Family History:    Family History   Problem Relation Age of Onset     Cancer Mother         endometrail cancer     Heart Disease Mother      Neurologic Disorder Mother         fibromyalgia     Diabetes Father      Cancer Father         pancreatic cancer     Heart Disease Maternal Grandmother      Prostate Cancer Maternal Grandfather      Cancer Paternal Grandfather         lung     Genitourinary Problems Paternal Grandfather         dialysis     Diabetes Brother      Cancer Son        Social History:  Marital Status:   " [2]  Social History     Tobacco Use     Smoking status: Never Smoker     Smokeless tobacco: Never Used   Substance Use Topics     Alcohol use: Yes     Comment: 2 daily (wine)     Drug use: No        Medications:    amoxicillin-clavulanate (AUGMENTIN) 875-125 MG tablet  predniSONE (DELTASONE) 20 MG tablet  albuterol (PROAIR HFA, PROVENTIL HFA, VENTOLIN HFA) 108 (90 BASE) MCG/ACT inhaler  Ascorbic Acid (VITAMIN C) 500 MG CAPS  azithromycin (ZITHROMAX) 250 MG tablet  benzonatate (TESSALON) 100 MG capsule  Cholecalciferol (VITAMIN D) 2000 UNITS CAPS  COCONUT OIL PO  Coenzyme Q10 (COQ10 PO)  dextromethorphan-guaiFENesin (MUCINEX DM)  MG per 12 hr tablet  Digestive Enzyme CAPS  Fexofenadine HCl (ALLEGRA ALLERGY PO)  fluticasone (FLONASE) 50 MCG/ACT spray  ibuprofen (ADVIL/MOTRIN) 200 MG tablet  ipratropium (ATROVENT) 0.06 % spray  LORazepam (ATIVAN) 0.5 MG tablet  Magnesium Oxide 250 MG TABS  OVER-THE-COUNTER  POTASSIUM CITRATE PO  Probiotic Product (PROBIOTIC DAILY PO)  pseudoePHEDrine (SUDAFED) 30 MG tablet  sulfamethoxazole-trimethoprim (BACTRIM DS/SEPTRA DS) 800-160 MG per tablet      Review of Systems  As mentioned above in the history present illness. All other systems were reviewed and are negative.    Physical Exam   BP: (!) 156/80  Pulse: 78  Temp: 98.2  F (36.8  C)  Height: 172.7 cm (5' 8\")  Weight: 86.2 kg (190 lb)  SpO2: 97 %      Physical Exam  GENERAL: alert, no acute distress and sounds nasally congested  EYES:  Right conjunctiva is not injected and without discharge.  Left conjunctiva is not injected and without discharge.  EARS: Right TM is normal: no effusions, no erythema, and normal landmarks.  Left TM is bubbles.  NOSE: Nasal mucosa is discharge yellow and inflamed.  Sinus not tender, maxillary swelling on left.  THROAT: normal and negative tonsillar hypertrophy, uvula midline, exudates absent.  NECK: supple with shotty nodes  CARDIAC:NORMAL - regular rate and rhythm without " murmur.  RESP: Normal - CTA without rales, rhonchi, or wheezing.  SKIN: normal  Neurological: Cranial nerves are grossly intact    ED Course        Procedures    No results found for this or any previous visit (from the past 24 hour(s)).    Medications - No data to display    Assessments & Plan (with Medical Decision Making)     I have reviewed the nursing notes.    I have reviewed the findings, diagnosis, plan and need for follow up with the patient.    Medical Decision Making:  CXR is not indicated.  Rapid Strep test is not indicated.     Assessment:  1) Sinusitis.    PLAN:  Augmentin twice daily for 10 days and prednisone 40 mg daily for 7 days.  Patient has ENT appointment tomorrow.  Recommended to keep this appointment.  Use Mucinex, nasal saline flushes, fluticasone nasal spray, increase fluids and rest.   Follow up with any questions or problems    Discharge Medication List as of 1/9/2020 12:25 PM      START taking these medications    Details   amoxicillin-clavulanate (AUGMENTIN) 875-125 MG tablet Take 1 tablet by mouth 2 times daily for 10 days, Disp-20 tablet, R-0, E-Prescribe      predniSONE (DELTASONE) 20 MG tablet Take 2 tablets (40 mg) by mouth daily for 7 days, Disp-14 tablet, R-0, E-Prescribe             Final diagnoses:   Acute sinusitis   ETD (eustachian tube dysfunction)       1/9/2020   [facility] EMERGENCY DEPARTMENT

## 2020-01-09 NOTE — TELEPHONE ENCOUNTER
Verified pt seen in ER and has scheduled appt Audiology and ENT tomorrow.  Beatrice WRIGHT   [facility]

## 2020-01-09 NOTE — ED AVS SNAPSHOT
[facility]  [address] [address]  Phone:  [phone]  Fax:  [phone]                                    Ruba Farmer   MRN: [id number]    Department:  [facility]   Date of Visit:  1/9/2020           After Visit Summary Signature Page    I have received my discharge instructions, and my questions have been answered. I have discussed any challenges I see with this plan with the nurse or doctor.    ..........................................................................................................................................  Patient/Patient Representative Signature      ..........................................................................................................................................  Patient Representative Print Name and Relationship to Patient    ..................................................               ................................................  Date                                   Time    ..........................................................................................................................................  Reviewed by Signature/Title    ...................................................              ..............................................  Date                                               Time          22EPIC Rev 08/18

## 2020-01-09 NOTE — TELEPHONE ENCOUNTER
"Received a warm call transfer:      Additional Information    Negative: Hearing loss in one or both ears of sudden onset and present now    Patient sounds very sick or weak to the triager    Answer Assessment - Initial Assessment Questions  1. DESCRIPTION: \"What type of hearing problem are you having? Describe it for me.\" (e.g., complete hearing loss, partial loss)      Total sudden hearing loss in the left ear this morning  2. LOCATION: \"One or both ears?\" If one, ask: \"Which ear?\"      Left ear only, can hear well out of the right  3. SEVERITY: \"Can you hear anything?\" If so, ask: \"What can you hear?\" (e.g., ticking watch, whisper, talking)      Patient can not hear anything out of the left ear  4. ONSET: \"When did this begin?\" \"Did it start suddenly or come on gradually?\"      Started with hearing changes yesterday in the left ear in the morning while on the phone, patient noticed that she was hearing what patient describes as \"sounds like going down a tunnel\" says she was able to hear high pitched sounds  5. PATTERN: \"Does this come and go, or has it been constant since it started?\"      constant  6. PAIN: \"Is there any pain in your ear(s)?\"  (Scale 1-10; or mild, moderate, severe)      denies  7. CAUSE: \"What do you think is causing this hearing problem?\"      unsure  8. OTHER SYMPTOMS: \"Do you have any other symptoms?\" (e.g., dizziness, ringing in ears)      Denies dizziness, no vertigo, no drainage, no swelling, and denies any injury or insult to the left ear  9. PREGNANCY: \"Is there any chance you are pregnant?\" \"When was your last menstrual period?\"      na    Protocols used: HEARING LOSS-A-[place]    "

## 2020-01-09 NOTE — DISCHARGE INSTRUCTIONS
Start Augmentin and take twice daily for 10 days.  Start prednisone today and take 2 tablets daily for 7 days.  It also may be helpful to start fluticasone nasal spray.  Follow-up with ENT appointment tomorrow morning.

## 2020-01-10 ENCOUNTER — OFFICE VISIT (OUTPATIENT)
Dept: AUDIOLOGY | Facility: CLINIC | Age: 75
End: 2020-01-10
Payer: MEDICARE

## 2020-01-10 ENCOUNTER — OFFICE VISIT (OUTPATIENT)
Dept: OTOLARYNGOLOGY | Facility: CLINIC | Age: 75
End: 2020-01-10
Payer: MEDICARE

## 2020-01-10 VITALS
BODY MASS INDEX: 28.89 KG/M2 | SYSTOLIC BLOOD PRESSURE: 148 MMHG | WEIGHT: 190 LBS | HEART RATE: 85 BPM | RESPIRATION RATE: 16 BRPM | DIASTOLIC BLOOD PRESSURE: 75 MMHG

## 2020-01-10 DIAGNOSIS — H90.42 SENSORINEURAL HEARING LOSS (SNHL) OF LEFT EAR WITH UNRESTRICTED HEARING OF RIGHT EAR: Primary | ICD-10-CM

## 2020-01-10 DIAGNOSIS — H90.3 SNHL (SENSORY-NEURAL HEARING LOSS), ASYMMETRICAL: Primary | ICD-10-CM

## 2020-01-10 PROCEDURE — 92557 COMPREHENSIVE HEARING TEST: CPT | Performed by: AUDIOLOGIST

## 2020-01-10 PROCEDURE — 99207 ZZC NO CHARGE LOS: CPT | Performed by: AUDIOLOGIST

## 2020-01-10 PROCEDURE — 92550 TYMPANOMETRY & REFLEX THRESH: CPT | Performed by: AUDIOLOGIST

## 2020-01-10 PROCEDURE — 99203 OFFICE O/P NEW LOW 30 MIN: CPT | Performed by: OTOLARYNGOLOGY

## 2020-01-10 NOTE — PROGRESS NOTES
AUDIOLOGY REPORT    SUMMARY: Audiology visit completed. See audiogram for results.      RECOMMENDATIONS: Follow-up with ENT.    aSsha Townsend.  Clinical Audiologist, [place] #[id number]

## 2020-01-10 NOTE — LETTER
"    1/10/2020         RE: Ruba Farmer  [address] [facility] [id number]        Dear Colleague,    Thank you for referring your patient, Ruba Farmer, to the [facility]. Please see a copy of my visit note below.    History of Present Illness - Ruba Farmer is a 74 year old female here to see me for the first time in follow up from an ER visit, due to acute onset LEFT ear hearing loss.    She tells me that this started on January 8, when suddenly her LEFT ear acutely got stuffy and felt like \"it was in a tunnel.\"  It would not resolve and she could not hear well on the phone. There was no pain.  No prodromal illness, no dizziness at all.    No history of any ear disease at all, no previous ear surgery at all.  No head trauma.  No chemo or radiation therapy.    Past Medical History -   Patient Active Problem List   Diagnosis     Anxiety     CARDIOVASCULAR SCREENING; LDL GOAL LESS THAN 160     GERD (gastroesophageal reflux disease)     Advanced directives, counseling/discussion     Polyp of colon, adenomatous     Senile nuclear sclerosis     Recurrent UTI     Nonallergic rhinitis     Diagnostic skin and sensitization tests (aka ALLERGENS)     Mild persistent asthma without complication     Bronchiectasis with acute exacerbation (H)     Strain of lumbar region       Current Medications -   Current Outpatient Medications:      albuterol (PROAIR HFA, PROVENTIL HFA, VENTOLIN HFA) 108 (90 BASE) MCG/ACT inhaler, Inhale 2 puffs into the lungs every 6 hours as needed for shortness of breath / dyspnea, Disp: 1 Inhaler, Rfl: 1     amoxicillin-clavulanate (AUGMENTIN) 875-125 MG tablet, Take 1 tablet by mouth 2 times daily for 10 days, Disp: 20 tablet, Rfl: 0     Ascorbic Acid (VITAMIN C) 500 MG CAPS, Take 500 mg by mouth daily , Disp: , Rfl:      azithromycin (ZITHROMAX) 250 MG tablet, Two tablets first day, then one tablet daily for four days., Disp: 6 tablet, Rfl: 0     benzonatate (TESSALON) " 100 MG capsule, Take 1 capsule (100 mg) by mouth 3 times daily as needed for cough, Disp: 42 capsule, Rfl: 0     Cholecalciferol (VITAMIN D) 2000 UNITS CAPS, Take 2,000 Units by mouth daily , Disp: , Rfl:      COCONUT OIL PO, Take 2 capsules by mouth daily, Disp: , Rfl:      Coenzyme Q10 (COQ10 PO), Take 1 capsule by mouth daily, Disp: , Rfl:      dextromethorphan-guaiFENesin (MUCINEX DM)  MG per 12 hr tablet, Take 1 tablet by mouth every 12 hours, Disp: , Rfl:      Digestive Enzyme CAPS, , Disp: , Rfl:      Fexofenadine HCl (ALLEGRA ALLERGY PO), Take by mouth daily, Disp: , Rfl:      fluticasone (FLONASE) 50 MCG/ACT spray, Spray 1-2 sprays into both nostrils daily, Disp: 16 g, Rfl: 0     ibuprofen (ADVIL/MOTRIN) 200 MG tablet, 1-3 tablets as needed with food, Disp: 120 tablet, Rfl: 0     ipratropium (ATROVENT) 0.06 % spray, Spray 2 sprays into both nostrils 3 times daily as needed for rhinitis, Disp: 3 Box, Rfl: 5     LORazepam (ATIVAN) 0.5 MG tablet, Take 1 tablet (0.5 mg) by mouth every 8 hours as needed for anxiety, Disp: 20 tablet, Rfl: 0     Magnesium Oxide 250 MG TABS, , Disp: , Rfl:      OVER-THE-COUNTER, D-monos takes after SIC to prevent UTIs, Disp: , Rfl:      POTASSIUM CITRATE PO, Take 1 tablet by mouth daily, Disp: , Rfl:      predniSONE (DELTASONE) 20 MG tablet, Take 2 tablets (40 mg) by mouth daily for 7 days, Disp: 14 tablet, Rfl: 0     Probiotic Product (PROBIOTIC DAILY PO), Take 1 tablet by mouth daily, Disp: , Rfl:      pseudoePHEDrine (SUDAFED) 30 MG tablet, Take 30 mg by mouth every 4 hours as needed for congestion, Disp: , Rfl:      sulfamethoxazole-trimethoprim (BACTRIM DS/SEPTRA DS) 800-160 MG per tablet, 1 tab after intercourse, Disp: 30 tablet, Rfl: 1    Allergies -   Allergies   Allergen Reactions     Macrobid [Nitrofuran Derivatives] Shortness Of Breath and Other (See Comments)     High fever     Codeine Other (See Comments) and Nausea and Vomiting     headache       Social History  -   Social History     Socioeconomic History     Marital status:      Spouse name: Not on file     Number of children: Not on file     Years of education: Not on file     Highest education level: Not on file   Occupational History     Not on file   Social Needs     Financial resource strain: Not on file     Food insecurity:     Worry: Not on file     Inability: Not on file     Transportation needs:     Medical: Not on file     Non-medical: Not on file   Tobacco Use     Smoking status: Never Smoker     Smokeless tobacco: Never Used   Substance and Sexual Activity     Alcohol use: Yes     Comment: 2 daily (wine)     Drug use: No     Sexual activity: Yes     Partners: Male     Birth control/protection: Surgical     Comment: hyst   Lifestyle     Physical activity:     Days per week: Not on file     Minutes per session: Not on file     Stress: Not on file   Relationships     Social connections:     Talks on phone: Not on file     Gets together: Not on file     Attends L[employer] service: Not on file     Active member of club or organization: Not on file     Attends meetings of clubs or organizations: Not on file     Relationship status: Not on file     Intimate partner violence:     Fear of current or ex partner: Not on file     Emotionally abused: Not on file     Physically abused: Not on file     Forced sexual activity: Not on file   Other Topics Concern     Parent/sibling w/ CABG, MI or angioplasty before 65F 55M? No   Social History Narrative     Not on file       Family History -   Family History   Problem Relation Age of Onset     Cancer Mother         endometrail cancer     Heart Disease Mother      Neurologic Disorder Mother         fibromyalgia     Diabetes Father      Cancer Father         pancreatic cancer     Heart Disease Maternal Grandmother      Prostate Cancer Maternal Grandfather      Cancer Paternal Grandfather         lung     Genitourinary Problems Paternal Grandfather         dialysis      Diabetes Brother      Cancer Son        Review of Systems - As per HPI and PMHx, otherwise 10+ system review of the head and neck, and general constitution is negative.    Physical Exam  There were no vitals taken for this visit.    General - The patient is well nourished and well developed, and appears to have good nutritional status.  Alert and oriented to person and place, answers questions and cooperates with examination appropriately.   Head and Face - Normocephalic and atraumatic, with no gross asymmetry noted of the contour of the facial features.  The facial nerve is intact, with strong symmetric movements.  Voice and Breathing - The patient was breathing comfortably without the use of accessory muscles. There was no wheezing, stridor, or stertor.  The patients voice was clear and strong, and had appropriate pitch and quality.  Ears - The tympanic membranes are normal in appearance, bony landmarks are intact.  No retraction, perforation, or masses.  No fluid or purulence was seen in the external canal or the middle ear. No evidence of infection of the middle ear or external canal, cerumen was normal in appearance.  Eyes - Extraocular movements intact, and the pupils were reactive to light.  Sclera were not icteric or injected, conjunctiva were pink and moist.  Mouth - Examination of the oral cavity showed pink, healthy oral mucosa. No lesions or ulcerations noted.  The tongue was mobile and midline, and the dentition were in good condition.    Throat - The walls of the oropharynx were smooth, pink, moist, symmetric, and had no lesions or ulcerations.  The tonsillar pillars and soft palate were symmetric.  The uvula was midline on elevation.    Neck - Normal midline excursion of the laryngotracheal complex during swallowing.  Full range of motion on passive movement.  Palpation of the occipital, submental, submandibular, internal jugular chain, and supraclavicular nodes did not demonstrate any abnormal lymph  nodes or masses.  The carotid pulse was palpable bilaterally.  Palpation of the thyroid was soft and smooth, with no nodules or goiter appreciated.  The trachea was mobile and midline.  Nose - External contour is symmetric, no gross deflection or scars.  Nasal mucosa is pink and moist with no abnormal mucus.  The septum was midline and non-obstructive, turbinates of normal size and position.  No polyps, masses, or purulence noted on examination.    Audiologic Studies - An audiogram and tympanogram were performed today as part of the evaluation and personally reviewed. The tympanogram shows a normal Type A curve, with normal canal volume and middle ear pressure.  There is no sign of eustachian tube dysfunction or middle ear effusion.  The audiogram shows severe sensorineural hearing loss in the LEFT ear. No conductive hearing loss.      A/P - Ruba Farmer is a 74 year old female  (H90.5) SNHL (sensory-neural hearing loss), asymmetrical  (primary encounter diagnosis)    The rest of today's visit was spent discussing the theories behind the cause of assymetric sensorineural hearing loss.  These included the microembolic and viral neuritis theories.  Also we discussed the indication for MRI of the Brain and Internal Auditory Canals.      Treatment with 2 weeks of oral steroids was discussed as the only treatment shown in clinical research to improve the chances of recovery of the hearing loss.  She is already on 40mg Prednisone twice daily for a week, and that is a reasonable course of treatment.    However, I made it a point to discuss that there is a definite chance of no return of the hearing lost.  The patient understood, and will follow up in 3 to 4 weeks for a repeat audiogram only visit.  At that point we will rediscuss an MRI when I call her with those results.      Again, thank you for allowing me to participate in the care of your patient.        Sincerely,        Parish Velázquez MD

## 2020-01-10 NOTE — PROGRESS NOTES
"History of Present Illness - Ruba Farmer is a 74 year old female here to see me for the first time in follow up from an ER visit, due to acute onset LEFT ear hearing loss.    She tells me that this started on January 8, when suddenly her LEFT ear acutely got stuffy and felt like \"it was in a tunnel.\"  It would not resolve and she could not hear well on the phone. There was no pain.  No prodromal illness, no dizziness at all.    No history of any ear disease at all, no previous ear surgery at all.  No head trauma.  No chemo or radiation therapy.    Past Medical History -   Patient Active Problem List   Diagnosis     Anxiety     CARDIOVASCULAR SCREENING; LDL GOAL LESS THAN 160     GERD (gastroesophageal reflux disease)     Advanced directives, counseling/discussion     Polyp of colon, adenomatous     Senile nuclear sclerosis     Recurrent UTI     Nonallergic rhinitis     Diagnostic skin and sensitization tests (aka ALLERGENS)     Mild persistent asthma without complication     Bronchiectasis with acute exacerbation (H)     Strain of lumbar region       Current Medications -   Current Outpatient Medications:      albuterol (PROAIR HFA, PROVENTIL HFA, VENTOLIN HFA) 108 (90 BASE) MCG/ACT inhaler, Inhale 2 puffs into the lungs every 6 hours as needed for shortness of breath / dyspnea, Disp: 1 Inhaler, Rfl: 1     amoxicillin-clavulanate (AUGMENTIN) 875-125 MG tablet, Take 1 tablet by mouth 2 times daily for 10 days, Disp: 20 tablet, Rfl: 0     Ascorbic Acid (VITAMIN C) 500 MG CAPS, Take 500 mg by mouth daily , Disp: , Rfl:      azithromycin (ZITHROMAX) 250 MG tablet, Two tablets first day, then one tablet daily for four days., Disp: 6 tablet, Rfl: 0     benzonatate (TESSALON) 100 MG capsule, Take 1 capsule (100 mg) by mouth 3 times daily as needed for cough, Disp: 42 capsule, Rfl: 0     Cholecalciferol (VITAMIN D) 2000 UNITS CAPS, Take 2,000 Units by mouth daily , Disp: , Rfl:      COCONUT OIL PO, Take 2 capsules by " mouth daily, Disp: , Rfl:      Coenzyme Q10 (COQ10 PO), Take 1 capsule by mouth daily, Disp: , Rfl:      dextromethorphan-guaiFENesin (MUCINEX DM)  MG per 12 hr tablet, Take 1 tablet by mouth every 12 hours, Disp: , Rfl:      Digestive Enzyme CAPS, , Disp: , Rfl:      Fexofenadine HCl (ALLEGRA ALLERGY PO), Take by mouth daily, Disp: , Rfl:      fluticasone (FLONASE) 50 MCG/ACT spray, Spray 1-2 sprays into both nostrils daily, Disp: 16 g, Rfl: 0     ibuprofen (ADVIL/MOTRIN) 200 MG tablet, 1-3 tablets as needed with food, Disp: 120 tablet, Rfl: 0     ipratropium (ATROVENT) 0.06 % spray, Spray 2 sprays into both nostrils 3 times daily as needed for rhinitis, Disp: 3 Box, Rfl: 5     LORazepam (ATIVAN) 0.5 MG tablet, Take 1 tablet (0.5 mg) by mouth every 8 hours as needed for anxiety, Disp: 20 tablet, Rfl: 0     Magnesium Oxide 250 MG TABS, , Disp: , Rfl:      OVER-THE-COUNTER, D-monos takes after SIC to prevent UTIs, Disp: , Rfl:      POTASSIUM CITRATE PO, Take 1 tablet by mouth daily, Disp: , Rfl:      predniSONE (DELTASONE) 20 MG tablet, Take 2 tablets (40 mg) by mouth daily for 7 days, Disp: 14 tablet, Rfl: 0     Probiotic Product (PROBIOTIC DAILY PO), Take 1 tablet by mouth daily, Disp: , Rfl:      pseudoePHEDrine (SUDAFED) 30 MG tablet, Take 30 mg by mouth every 4 hours as needed for congestion, Disp: , Rfl:      sulfamethoxazole-trimethoprim (BACTRIM DS/SEPTRA DS) 800-160 MG per tablet, 1 tab after intercourse, Disp: 30 tablet, Rfl: 1    Allergies -   Allergies   Allergen Reactions     Macrobid [Nitrofuran Derivatives] Shortness Of Breath and Other (See Comments)     High fever     Codeine Other (See Comments) and Nausea and Vomiting     headache       Social History -   Social History     Socioeconomic History     Marital status:      Spouse name: Not on file     Number of children: Not on file     Years of education: Not on file     Highest education level: Not on file   Occupational History     Not  on file   Social Needs     Financial resource strain: Not on file     Food insecurity:     Worry: Not on file     Inability: Not on file     Transportation needs:     Medical: Not on file     Non-medical: Not on file   Tobacco Use     Smoking status: Never Smoker     Smokeless tobacco: Never Used   Substance and Sexual Activity     Alcohol use: Yes     Comment: 2 daily (wine)     Drug use: No     Sexual activity: Yes     Partners: Male     Birth control/protection: Surgical     Comment: hyst   Lifestyle     Physical activity:     Days per week: Not on file     Minutes per session: Not on file     Stress: Not on file   Relationships     Social connections:     Talks on phone: Not on file     Gets together: Not on file     Attends Adventism service: Not on file     Active member of club or organization: Not on file     Attends meetings of clubs or organizations: Not on file     Relationship status: Not on file     Intimate partner violence:     Fear of current or ex partner: Not on file     Emotionally abused: Not on file     Physically abused: Not on file     Forced sexual activity: Not on file   Other Topics Concern     Parent/sibling w/ CABG, MI or angioplasty before 65F 55M? No   Social History Narrative     Not on file       Family History -   Family History   Problem Relation Age of Onset     Cancer Mother         endometrail cancer     Heart Disease Mother      Neurologic Disorder Mother         fibromyalgia     Diabetes Father      Cancer Father         pancreatic cancer     Heart Disease Maternal Grandmother      Prostate Cancer Maternal Grandfather      Cancer Paternal Grandfather         lung     Genitourinary Problems Paternal Grandfather         dialysis     Diabetes Brother      Cancer Son        Review of Systems - As per HPI and PMHx, otherwise 10+ system review of the head and neck, and general constitution is negative.    Physical Exam  There were no vitals taken for this visit.    General - The  patient is well nourished and well developed, and appears to have good nutritional status.  Alert and oriented to person and place, answers questions and cooperates with examination appropriately.   Head and Face - Normocephalic and atraumatic, with no gross asymmetry noted of the contour of the facial features.  The facial nerve is intact, with strong symmetric movements.  Voice and Breathing - The patient was breathing comfortably without the use of accessory muscles. There was no wheezing, stridor, or stertor.  The patients voice was clear and strong, and had appropriate pitch and quality.  Ears - The tympanic membranes are normal in appearance, bony landmarks are intact.  No retraction, perforation, or masses.  No fluid or purulence was seen in the external canal or the middle ear. No evidence of infection of the middle ear or external canal, cerumen was normal in appearance.  Eyes - Extraocular movements intact, and the pupils were reactive to light.  Sclera were not icteric or injected, conjunctiva were pink and moist.  Mouth - Examination of the oral cavity showed pink, healthy oral mucosa. No lesions or ulcerations noted.  The tongue was mobile and midline, and the dentition were in good condition.    Throat - The walls of the oropharynx were smooth, pink, moist, symmetric, and had no lesions or ulcerations.  The tonsillar pillars and soft palate were symmetric.  The uvula was midline on elevation.    Neck - Normal midline excursion of the laryngotracheal complex during swallowing.  Full range of motion on passive movement.  Palpation of the occipital, submental, submandibular, internal jugular chain, and supraclavicular nodes did not demonstrate any abnormal lymph nodes or masses.  The carotid pulse was palpable bilaterally.  Palpation of the thyroid was soft and smooth, with no nodules or goiter appreciated.  The trachea was mobile and midline.  Nose - External contour is symmetric, no gross deflection or  scars.  Nasal mucosa is pink and moist with no abnormal mucus.  The septum was midline and non-obstructive, turbinates of normal size and position.  No polyps, masses, or purulence noted on examination.    Audiologic Studies - An audiogram and tympanogram were performed today as part of the evaluation and personally reviewed. The tympanogram shows a normal Type A curve, with normal canal volume and middle ear pressure.  There is no sign of eustachian tube dysfunction or middle ear effusion.  The audiogram shows severe sensorineural hearing loss in the LEFT ear. No conductive hearing loss.      A/P - Ruba Farmer is a 74 year old female  (H90.5) SNHL (sensory-neural hearing loss), asymmetrical  (primary encounter diagnosis)    The rest of today's visit was spent discussing the theories behind the cause of assymetric sensorineural hearing loss.  These included the microembolic and viral neuritis theories.  Also we discussed the indication for MRI of the Brain and Internal Auditory Canals.      Treatment with 2 weeks of oral steroids was discussed as the only treatment shown in clinical research to improve the chances of recovery of the hearing loss.  She is already on 40mg Prednisone twice daily for a week, and that is a reasonable course of treatment.    However, I made it a point to discuss that there is a definite chance of no return of the hearing lost.  The patient understood, and will follow up in 3 to 4 weeks for a repeat audiogram only visit.  At that point we will rediscuss an MRI when I call her with those results.

## 2020-01-10 NOTE — NURSING NOTE
"Chief Complaint   Patient presents with     Hearing Problem       Initial BP (!) 156/76 (BP Location: Right arm, Patient Position: Chair, Cuff Size: Adult Regular)   Pulse 85   Resp 16   Wt 86.2 kg (190 lb)   BMI 28.89 kg/m   Estimated body mass index is 28.89 kg/m  as calculated from the following:    Height as of 1/9/20: 1.727 m (5' 8\").    Weight as of this encounter: 86.2 kg (190 lb).  BP completed using cuff size: regular long   Medications and allergies reviewed.      Brooklyn CARNEY CMA     "

## 2020-01-10 NOTE — PROGRESS NOTES
AUDIOLOGY REPORT    SUMMARY: Audiology visit completed. See audiogram for results.      RECOMMENDATIONS: Follow-up with ENT.    Sasha Townsend.  Clinical Audiologist, [place] #[id number]

## 2020-01-10 NOTE — PATIENT INSTRUCTIONS
Per physician instructions.    If you have questions or concerns on any instructions given to you by your provider today or if you need to schedule an appointment, you can reach us at [phone].    Thank you!

## 2020-01-14 ENCOUNTER — TELEPHONE (OUTPATIENT)
Dept: FAMILY MEDICINE | Facility: CLINIC | Age: 75
End: 2020-01-14

## 2020-01-14 ENCOUNTER — TELEPHONE (OUTPATIENT)
Dept: OTOLARYNGOLOGY | Facility: CLINIC | Age: 75
End: 2020-01-14

## 2020-01-14 DIAGNOSIS — H90.3 SNHL (SENSORY-NEURAL HEARING LOSS), ASYMMETRICAL: Primary | ICD-10-CM

## 2020-01-14 RX ORDER — PREDNISONE 10 MG/1
20 TABLET ORAL 2 TIMES DAILY
Qty: 20 TABLET | Refills: 0 | Status: SHIPPED | OUTPATIENT
Start: 2020-01-14 | End: 2020-02-10

## 2020-01-14 NOTE — TELEPHONE ENCOUNTER
Called and left a voicemail.  I think it is reasonable to stop the augmentin, but certainly continue the prednisone.  I will try to call again later today during my clinic.

## 2020-01-14 NOTE — TELEPHONE ENCOUNTER
Reason for Call:  Other     Detailed comments: Please call patient she would like to go over her medications with Philly    Phone Number Patient can be reached at: Home number on file [phone] (home)    Best Time:     Can we leave a detailed message on this number? YES    Call taken on 1/14/2020 at 9:19 AM by Ewa Sanchez

## 2020-01-14 NOTE — TELEPHONE ENCOUNTER
Spoke with Marcy, she wanted to let us know she is going to stop the Augmentin she is taking for sinusitis. She saw Dr Velázquez for her ear and he said she probably could stop the Augmentin as she probably did not have a sinus infection. She now has bronchial symptoms and has a Z-pack on hand. She is going to start this and stop Augmentin. She does have a call into Dr Velázquez's office as well regarding her ear/hearing loss symptoms, she said he might start an antiviral medication as well. I did encourage her to discuss antibiotics with him as well.

## 2020-01-14 NOTE — TELEPHONE ENCOUNTER
Reason for call:  Symptom   Symptom or request:  Lung issues     Duration (how long have symptoms been present):  A couple days  Have you been treated for this before? Yes    Additional comments:  Is on steroids and antibiotic    Phone number to reach patient:  Home number on file [phone] (home)    Best Time:   Any     Can we leave a detailed message on this number?  YES

## 2020-01-14 NOTE — TELEPHONE ENCOUNTER
Return call to pt. She is concerned about now having a bronchial episode happening. She states she has had this many times, been hospitalized in the past. She feels that her other s/s were dx as viral she is now wondering if her bronchial is viral as well. Wondering if she should be placed on a antiviral medication. I let her know likely not, but would forward message to Dr. Velázquez.     She asked if she should switch her abx from her current use of Augmentin to her Z aileen, I let her know that she should contact her PMD for this advising. She then mentioned that she is coughing up blood tinged mucous. I advised she be seen for this, she stated she's had this so many times she wasn't concerned about it. She wasn't going to be seen for this. I let her know our protocol would to be evaluated.    Heaven Downing RN Specialty Triage 1/14/2020 9:13 AM

## 2020-01-16 ENCOUNTER — MYC MEDICAL ADVICE (OUTPATIENT)
Dept: OTOLARYNGOLOGY | Facility: CLINIC | Age: 75
End: 2020-01-16

## 2020-01-16 DIAGNOSIS — H90.3 SNHL (SENSORY-NEURAL HEARING LOSS), ASYMMETRICAL: Primary | ICD-10-CM

## 2020-01-16 RX ORDER — PREDNISONE 10 MG/1
10 TABLET ORAL 2 TIMES DAILY
Qty: 10 TABLET | Refills: 2 | Status: SHIPPED | OUTPATIENT
Start: 2020-01-16 | End: 2020-02-10

## 2020-01-31 ENCOUNTER — OFFICE VISIT (OUTPATIENT)
Dept: AUDIOLOGY | Facility: CLINIC | Age: 75
End: 2020-01-31
Payer: MEDICARE

## 2020-01-31 DIAGNOSIS — Z86.69 HISTORY OF SUDDEN HEARING LOSS: Primary | ICD-10-CM

## 2020-01-31 PROCEDURE — 92550 TYMPANOMETRY & REFLEX THRESH: CPT | Performed by: AUDIOLOGIST

## 2020-01-31 PROCEDURE — 92557 COMPREHENSIVE HEARING TEST: CPT | Performed by: AUDIOLOGIST

## 2020-01-31 PROCEDURE — 99207 ZZC NO CHARGE LOS: CPT | Performed by: AUDIOLOGIST

## 2020-02-10 ENCOUNTER — OFFICE VISIT (OUTPATIENT)
Dept: FAMILY MEDICINE | Facility: CLINIC | Age: 75
End: 2020-02-10
Payer: MEDICARE

## 2020-02-10 VITALS
SYSTOLIC BLOOD PRESSURE: 134 MMHG | BODY MASS INDEX: 29.64 KG/M2 | HEIGHT: 68 IN | HEART RATE: 72 BPM | WEIGHT: 195.6 LBS | DIASTOLIC BLOOD PRESSURE: 78 MMHG | RESPIRATION RATE: 18 BRPM | TEMPERATURE: 98.1 F

## 2020-02-10 DIAGNOSIS — L82.1 SEBORRHEIC KERATOSES: ICD-10-CM

## 2020-02-10 DIAGNOSIS — Z00.00 ENCOUNTER FOR MEDICARE ANNUAL WELLNESS EXAM: Primary | ICD-10-CM

## 2020-02-10 DIAGNOSIS — J47.1 BRONCHIECTASIS WITH ACUTE EXACERBATION (H): ICD-10-CM

## 2020-02-10 PROCEDURE — G0439 PPPS, SUBSEQ VISIT: HCPCS | Performed by: FAMILY MEDICINE

## 2020-02-10 PROCEDURE — 17110 DESTRUCTION B9 LES UP TO 14: CPT | Performed by: FAMILY MEDICINE

## 2020-02-10 RX ORDER — AZITHROMYCIN 250 MG/1
TABLET, FILM COATED ORAL
Qty: 6 TABLET | Refills: 0 | Status: SHIPPED | OUTPATIENT
Start: 2020-02-10 | End: 2020-03-02

## 2020-02-10 ASSESSMENT — ENCOUNTER SYMPTOMS
JOINT SWELLING: 0
CHILLS: 0
SHORTNESS OF BREATH: 1
COUGH: 1
HEADACHES: 0
PALPITATIONS: 1
EYE PAIN: 0
CONSTIPATION: 0
DIZZINESS: 0
HEMATOCHEZIA: 0
PARESTHESIAS: 0
NAUSEA: 1
HEARTBURN: 0
DYSURIA: 0
FREQUENCY: 1
ARTHRALGIAS: 0
MYALGIAS: 0
DIARRHEA: 0
NERVOUS/ANXIOUS: 1
SORE THROAT: 0
BREAST MASS: 0
HEMATURIA: 0
ABDOMINAL PAIN: 0
FEVER: 0

## 2020-02-10 ASSESSMENT — MIFFLIN-ST. JEOR: SCORE: 1435.74

## 2020-02-10 ASSESSMENT — ACTIVITIES OF DAILY LIVING (ADL): CURRENT_FUNCTION: NO ASSISTANCE NEEDED

## 2020-02-10 NOTE — PROGRESS NOTES
"SUBJECTIVE:   Ruba Farmer is a 74 year old female who presents for Preventive Visit.      Are you in the first 12 months of your Medicare coverage?  No    Healthy Habits:     In general, how would you rate your overall health?  Good    Frequency of exercise:  4-5 days/week    Duration of exercise:  30-45 minutes    Do you usually eat at least 4 servings of fruit and vegetables a day, include whole grains    & fiber and avoid regularly eating high fat or \"junk\" foods?  Yes    Taking medications regularly:  Yes    Medication side effects:  Other    Ability to successfully perform activities of daily living:  No assistance needed    Home Safety:  No safety concerns identified    Hearing Impairment:  Difficulty following dialogue in the theater and no hearing concerns    In the past 6 months, have you been bothered by leaking of urine? Yes    In general, how would you rate your overall mental or emotional health?  Good      PHQ-2 Total Score: 2    Additional concerns today:  Yes    Do you feel safe in your environment? Yes    Have you ever done Advance Care Planning? (For example, a Health Directive, POLST, or a discussion with a medical provider or your loved ones about your wishes): No, advance care planning information given to patient to review.  Patient plans to discuss their wishes with loved ones or provider.       Hearing Assessment: UNABLE TO TEST  Fall risk  Fallen 2 or more times in the past year?: No  Any fall with injury in the past year?: No    Cognitive Screening   1) Repeat 3 items (Leader, Season, Table)    2) Clock draw: NORMAL  3) 3 item recall: Recalls 3 objects  Results: 3 items recalled: COGNITIVE IMPAIRMENT LESS LIKELY    Mini-CogTM Copyright JESÚS Chino. Licensed by the author for use in [facility]; reprinted with permission (licha@.[facility]). All rights reserved.      Do you have sleep apnea, excessive snoring or daytime drowsiness?: yes    Reviewed and updated as needed this visit by " clinical staff  Tobacco  Allergies  Meds  Problems  Med Hx  Surg Hx  Fam Hx  Soc Hx          Reviewed and updated as needed this visit by Provider  Tobacco  Allergies  Meds  Problems  Med Hx  Surg Hx  Fam Hx        Social History     Tobacco Use     Smoking status: Never Smoker     Smokeless tobacco: Never Used   Substance Use Topics     Alcohol use: Yes     Comment: 2 daily (wine)     If you drink alcohol do you typically have >3 drinks per day or >7 drinks per week? Yes      Alcohol Use 2/10/2020   Prescreen: >3 drinks/day or >7 drinks/week? Yes   Prescreen: >3 drinks/day or >7 drinks/week? -   AUDIT SCORE  5     AUDIT - Alcohol Use Disorders Identification Test - Reproduced from the World Health Organization Audit 2001 (Second Edition) 2/10/2020   1.  How often do you have a drink containing alcohol? 4 or more times a week   2.  How many drinks containing alcohol do you have on a typical day when you are drinking? 1 or 2   3.  How often do you have five or more drinks on one occasion? Less than monthly   4.  How often during the last year have you found that you were not able to stop drinking once you had started? Never   5.  How often during the last year have you failed to do what was normally expected of you because of drinking? Never   6.  How often during the last year have you needed a first drink in the morning to get yourself going after a heavy drinking session? Never   7.  How often during the last year have you had a feeling of guilt or remorse after drinking? Never   8.  How often during the last year have you been unable to remember what happened the night before because of your drinking? Never   9.  Have you or someone else been injured because of your drinking? No   10. Has a relative, friend, doctor or other health care worker been concerned about your drinking or suggested you cut down? No   TOTAL SCORE 5     Concerns:  Sudden hearing loss   Side effects from steroid   Cold while  she was on steroid and Augmentin       Stomach/Nausea   Has nausea when doing yoga in upside down position  Feels reflux and nausea for days    Nystatin cream and pills feels she has systemic yeast and would like systemic rx for this   Derm    Back pain  Is better after steroids       Current providers sharing in care for this patient include:   Patient Care Team:  Xiao Dasilva MD as PCP - General (Family Practice)  Xiao Dasilva MD as Assigned PCP    The following health maintenance items are reviewed in Epic and correct as of today:  Health Maintenance   Topic Date Due     MEDICARE ANNUAL WELLNESS VISIT  01/04/2017     ZOSTER IMMUNIZATION (2 of 3) 06/14/2017     ASTHMA ACTION PLAN  11/07/2018     FALL RISK ASSESSMENT  05/04/2019     ASTHMA CONTROL TEST  07/15/2019     PHQ-2  01/01/2020     MAMMO SCREENING  02/18/2021     ADVANCE CARE PLANNING  07/27/2022     COLONOSCOPY  07/11/2023     LIPID  01/15/2024     DTAP/TDAP/TD IMMUNIZATION (4 - Td) 11/07/2027     DEXA  Completed     HEPATITIS C SCREENING  Completed     INFLUENZA VACCINE  Completed     PNEUMOCOCCAL IMMUNIZATION 65+ LOW/MEDIUM RISK  Completed     IPV IMMUNIZATION  Aged Out     MENINGITIS IMMUNIZATION  Aged Out     Labs reviewed in EPIC      Review of Systems   Constitutional: Negative for chills and fever.   HENT: Positive for congestion. Negative for ear pain, hearing loss and sore throat.    Eyes: Negative for pain and visual disturbance.   Respiratory: Positive for cough and shortness of breath.    Cardiovascular: Negative for chest pain, palpitations and peripheral edema.   Gastrointestinal: Negative for abdominal pain, constipation, diarrhea, heartburn, hematochezia and nausea.   Breasts:  Negative for tenderness, breast mass and discharge.   Genitourinary: Negative for dysuria, frequency, genital sores, hematuria, pelvic pain, urgency, vaginal bleeding and vaginal discharge.   Musculoskeletal: Negative for arthralgias, joint  "swelling and myalgias.   Skin: Negative for rash.   Neurological: Negative for dizziness, headaches and paresthesias.   Psychiatric/Behavioral: Negative for mood changes. The patient is not nervous/anxious.      The above are off and on due to bronchiectasis     OBJECTIVE:   /78 (BP Location: Right arm, Patient Position: Sitting, Cuff Size: Adult Large)   Pulse 72   Temp 98.1  F (36.7  C) (Tympanic)   Resp 18   Ht 1.727 m (5' 8\")   Wt 88.7 kg (195 lb 9.6 oz)   BMI 29.74 kg/m   Estimated body mass index is 29.74 kg/m  as calculated from the following:    Height as of this encounter: 1.727 m (5' 8\").    Weight as of this encounter: 88.7 kg (195 lb 9.6 oz).  Physical Exam  GENERAL APPEARANCE: healthy, alert and no distress  EYES: Eyes grossly normal to inspection, PERRL and conjunctivae and sclerae normal  HENT: ear canals and TM's normal, nose and mouth without ulcers or lesions, oropharynx clear and oral mucous membranes moist  NECK: no adenopathy, no asymmetry, masses, or scars and thyroid normal to palpation  RESP: lungs clear to auscultation - no rales, rhonchi or wheezes  BREAST: normal without masses, tenderness or nipple discharge and no palpable axillary masses or adenopathy  CV: regular rate and rhythm, normal S1 S2, no S3 or S4, no murmur, click or rub, no peripheral edema and peripheral pulses strong  ABDOMEN: soft, nontender, no hepatosplenomegaly, no masses and bowel sounds normal  MS: no musculoskeletal defects are noted and gait is age appropriate without ataxia  SKIN: seb keratosis on the mid back inflamed   Cryotherapy x2 for the seb k   NEURO: Normal strength and tone, sensory exam grossly normal, mentation intact and speech normal  PSYCH: mentation appears normal and affect normal/bright    Diagnostic Test Results:  Labs reviewed in Epic    ASSESSMENT / PLAN:   1. Encounter for Medicare annual wellness exam      2. Bronchiectasis with acute exacerbation (H)  Refilled below for when " "needed   - azithromycin (ZITHROMAX) 250 MG tablet; Two tablets first day, then one tablet daily for four days.  Dispense: 6 tablet; Refill: 0    3. Seborrheic keratoses    - DESTRUCT BENIGN LESION, UP TO 14    COUNSELING:  Reviewed preventive health counseling, as reflected in patient instructions    Estimated body mass index is 29.74 kg/m  as calculated from the following:    Height as of this encounter: 1.727 m (5' 8\").    Weight as of this encounter: 88.7 kg (195 lb 9.6 oz).         reports that she has never smoked. She has never used smokeless tobacco.      Appropriate preventive services were discussed with this patient, including applicable screening as appropriate for cardiovascular disease, diabetes, osteopenia/osteoporosis, and glaucoma.  As appropriate for age/gender, discussed screening for colorectal cancer, prostate cancer, breast cancer, and cervical cancer. Checklist reviewing preventive services available has been given to the patient.    Reviewed patients plan of care and provided an AVS. The Basic Care Plan (routine screening as documented in Health Maintenance) for Ruba meets the Care Plan requirement. This Care Plan has been established and reviewed with the Patient.    Counseling Resources:  ATP IV Guidelines  Pooled Cohorts Equation Calculator  Breast Cancer Risk Calculator  FRAX Risk Assessment  ICSI Preventive Guidelines  Dietary Guidelines for Americans, 2010  [employer]'s MyPlate  ASA Prophylaxis  Lung CA Screening    Xiao Dasilav MD  [facility]    Identified Health Risks:  "

## 2020-02-10 NOTE — NURSING NOTE
"Chief Complaint   Patient presents with     Physical     Wellness Visit       Initial /78 (BP Location: Right arm, Patient Position: Sitting, Cuff Size: Adult Large)   Pulse 72   Temp 98.1  F (36.7  C) (Tympanic)   Resp 18   Ht 1.727 m (5' 8\")   Wt 88.7 kg (195 lb 9.6 oz)   BMI 29.74 kg/m   Estimated body mass index is 29.74 kg/m  as calculated from the following:    Height as of this encounter: 1.727 m (5' 8\").    Weight as of this encounter: 88.7 kg (195 lb 9.6 oz).    Patient presents to the clinic using No DME    Health Maintenance that is potentially due pending provider review:  NONE    n/a    Is there anyone who you would like to be able to receive your results? No  If yes have patient fill out TUNG    Ivelisse Rudd CMA    "

## 2020-02-10 NOTE — LETTER
My Asthma Action Plan    Name: Ruba Farmer   YOB: 1945  Date: 2/24/2020   My doctor: Xiao Dasilva MD   My clinic: [facility]        My Control Medicine: None  My Rescue Medicine: Albuterol (Proair/Ventolin/Proventil HFA) 2-4 puffs EVERY 4 HOURS as needed. Use a spacer if recommended by your provider.  My Oral Steroid Medicine: none My Asthma Severity:   Mild Persistent  Know your asthma triggers: exercise or sports  cold air            GREEN ZONE   Good Control    I feel good    No cough or wheeze    Can work, sleep and play without asthma symptoms       Take your asthma control medicine every day.     1. If exercise triggers your asthma, take your rescue medication    15 minutes before exercise or sports, and    During exercise if you have asthma symptoms  2. Spacer to use with inhaler: If you have a spacer, make sure to use it with your inhaler             YELLOW ZONE Getting Worse  I have ANY of these:    I do not feel good    Cough or wheeze    Chest feels tight    Wake up at night   1. Keep taking your Green Zone medications  2. Start taking your rescue medicine:    every 20 minutes for up to 1 hour. Then every 4 hours for 24-48 hours.  3. If you stay in the Yellow Zone for more than 12-24 hours, contact your doctor.  4. If you do not return to the Green Zone in 12-24 hours or you get worse, start taking your oral steroid medicine if prescribed by your provider.           RED ZONE Medical Alert - Get Help  I have ANY of these:    I feel awful    Medicine is not helping    Breathing getting harder    Trouble walking or talking    Nose opens wide to breathe       1. Take your rescue medicine NOW  2. If your provider has prescribed an oral steroid medicine, start taking it NOW  3. Call your doctor NOW  4. If you are still in the Red Zone after 20 minutes and you have not reached your doctor:    Take your rescue medicine again and    Call 911 or go to the emergency  room right away    See your regular doctor within 2 weeks of an Emergency Room or Urgent Care visit for follow-up treatment.          Annual Reminders:  Meet with Asthma Educator,  Flu Shot in the Fall, consider Pneumonia Vaccination for patients with asthma (aged 19 and older).    Pharmacy:    [facility], [place] - [address] [place] [address]    Electronically signed by Xiao Dasilva MD   Date: 02/24/20                      Asthma Triggers  How To Control Things That Make Your Asthma Worse    Triggers are things that make your asthma worse.  Look at the list below to help you find your triggers and what you can do about them.  You can help prevent asthma flare-ups by staying away from your triggers.      Trigger                                                          What you can do   Cigarette Smoke  Tobacco smoke can make asthma worse. Do not allow smoking in your home, car or around you.  Be sure no one smokes at a child s day care or school.  If you smoke, ask your health care provider for ways to help you quit.  Ask family members to quit too.  Ask your health care provider for a referral to Quit Plan to help you quit smoking, or call 5-529-478-PLAN.     Colds, Flu, Bronchitis  These are common triggers of asthma. Wash your hands often.  Don t touch your eyes, nose or mouth.  Get a flu shot every year.     Dust Mites  These are tiny bugs that live in cloth or carpet. They are too small to see. Wash sheets and blankets in hot water every week.   Encase pillows and mattress in dust mite proof covers.  Avoid having carpet if you can. If you have carpet, vacuum weekly.   Use a dust mask and HEPA vacuum.   Pollen and Outdoor Mold  Some people are allergic to trees, grass, or weed pollen, or molds. Try to keep your windows closed.  Limit time out doors when pollen count is high.   Ask you health care provider about taking medicine during allergy season.     Animal  Dander  Some people are allergic to skin flakes, urine or saliva from pets with fur or feathers. Keep pets with fur or feathers out of your home.    If you can t keep the pet outdoors, then keep the pet out of your bedroom.  Keep the bedroom door closed.  Keep pets off cloth furniture and away from stuffed toys.     Mice, Rats, and Cockroaches   Some people are allergic to the waste from these pests.   Cover food and garbage.  Clean up spills and food crumbs.  Store grease in the refrigerator.   Keep food out of the bedroom.   Indoor Mold  This can be a trigger if your home has high moisture. Fix leaking faucets, pipes, or other sources of water.   Clean moldy surfaces.  Dehumidify basement if it is damp and smelly.   Smoke, Strong Odors, and Sprays  These can reduce air quality. Stay away from strong odors and sprays, such as perfume, powder, hair spray, paints, smoke incense, paint, cleaning products, candles and new carpet.   Exercise or Sports  Some people with asthma have this trigger. Be active!  Ask your doctor about taking medicine before sports or exercise to prevent symptoms.    Warm up for 5-10 minutes before and after sports or exercise.     Other Triggers of Asthma  Cold air:  Cover your nose and mouth with a scarf.  Sometimes laughing or crying can be a trigger.  Some medicines and food can trigger asthma.

## 2020-02-10 NOTE — PATIENT INSTRUCTIONS
Patient Education   Personalized Prevention Plan  You are due for the preventive services outlined below.  Your care team is available to assist you in scheduling these services.  If you have already completed any of these items, please share that information with your care team to update in your medical record.          Lesion on back is frozen     zpak refilled     I will look into the nystatin and let you know

## 2020-02-11 ASSESSMENT — ASTHMA QUESTIONNAIRES: ACT_TOTALSCORE: 22

## 2020-02-17 ENCOUNTER — HEALTH MAINTENANCE LETTER (OUTPATIENT)
Age: 75
End: 2020-02-17

## 2020-02-21 ASSESSMENT — ENCOUNTER SYMPTOMS
PALPITATIONS: 0
NERVOUS/ANXIOUS: 0
NAUSEA: 0
FREQUENCY: 0

## 2020-03-02 ENCOUNTER — MYC MEDICAL ADVICE (OUTPATIENT)
Dept: FAMILY MEDICINE | Facility: CLINIC | Age: 75
End: 2020-03-02

## 2020-03-02 DIAGNOSIS — J47.1 BRONCHIECTASIS WITH ACUTE EXACERBATION (H): ICD-10-CM

## 2020-03-02 RX ORDER — AZITHROMYCIN 250 MG/1
TABLET, FILM COATED ORAL
Qty: 6 TABLET | Refills: 0 | Status: SHIPPED | OUTPATIENT
Start: 2020-03-02 | End: 2021-01-13

## 2020-03-02 NOTE — TELEPHONE ENCOUNTER
Requested Prescriptions   Pending Prescriptions Disp Refills     azithromycin (ZITHROMAX) 250 MG tablet [Pharmacy Med Name: AZITHROMYCIN 250MG TABS] 6 tablet 0     Sig: TAKE 2 TABLETS BY MOUTH ON THE FIRST DAY, THEN TAKE ONE TABLET BY MOUTH ONCE DAILY FOR 4 DAYS       There is no refill protocol information for this order        Last Written Prescription Date:  2/10/20  Last Fill Quantity: 6,  # refills: 0   Last office visit: 2/10/2020 with prescribing provider:     Future Office Visit:

## 2020-03-02 NOTE — TELEPHONE ENCOUNTER
From 2/10/20 encounter, likes to have this on hand:    2. Bronchiectasis with acute exacerbation (H)  Refilled below for when needed   - azithromycin (ZITHROMAX) 250 MG tablet; Two tablets first day, then one tablet daily for four days.  Dispense: 6 tablet; Refill: 0

## 2020-03-03 DIAGNOSIS — K13.0 ANGULAR CHEILITIS: Primary | ICD-10-CM

## 2020-03-03 NOTE — TELEPHONE ENCOUNTER
"Requested Prescriptions   Pending Prescriptions Disp Refills     nystatin (MYCOSTATIN) 472042 UNIT/GM external cream [Pharmacy Med Name: NYSTATIN [id number]/GM CREA] 30 g 0     Sig: APPLY LIBERALLY TO AFFECTED AREA(S) TWO TIMES A DAY TO THREE TIMES A DAY       Antifungal Agents Failed - 3/3/2020 12:05 PM        Failed - Medication is active on med list        Passed - Recent (12 mo) or future (30 days) visit within the authorizing provider's specialty     Patient has had an office visit with the authorizing provider or a provider within the authorizing providers department within the previous 12 mos or has a future within next 30 days. See \"Patient Info\" tab in inbasket, or \"Choose Columns\" in Meds & Orders section of the refill encounter.              Passed - Not Fluconazole or Terconazole      If oral Fluconazole or Terconazole, may refill if indicated in progress notes.           Last Written Prescription Date:  Not on med list- pharmacy last filled 7/25/19  Last Fill Quantity: Thirty,  # refills:     Last office visit: 2/10/2020 with prescribing provider:  Vidya   Future Office Visit:      "

## 2020-03-03 NOTE — TELEPHONE ENCOUNTER
Not on medication list see your last dictation regarding Nystatin    Personalized Prevention Plan  You are due for the preventive services outlined below.  Your care team is available to assist you in scheduling these services.  If you have already completed any of these items, please share that information with your care team to update in your medical record.            Lesion on back is frozen      zpak refilled      I will look into the nystatin and let you know

## 2020-03-04 RX ORDER — NYSTATIN 100000 U/G
CREAM TOPICAL
Qty: 30 G | Refills: 0 | Status: SHIPPED | OUTPATIENT
Start: 2020-03-04 | End: 2021-10-26

## 2020-03-13 DIAGNOSIS — R05.3 CHRONIC COUGH: ICD-10-CM

## 2020-03-13 NOTE — TELEPHONE ENCOUNTER
"Requested Prescriptions   Pending Prescriptions Disp Refills     albuterol (PROAIR HFA/PROVENTIL HFA/VENTOLIN HFA) 108 (90 Base) MCG/ACT inhaler 1 Inhaler 1     Sig: Inhale 2 puffs into the lungs every 6 hours as needed for shortness of breath / dyspnea       Asthma Maintenance Inhalers - Anticholinergics Passed - 3/13/2020 11:34 AM        Passed - Patient is age 12 years or older        Passed - Asthma control assessment score within normal limits in last 6 months     Please review ACT score.           Passed - Medication is active on med list        Passed - Recent (6 mo) or future (30 days) visit within the authorizing provider's specialty     Patient had office visit in the last 6 months or has a visit in the next 30 days with authorizing provider or within the authorizing provider's specialty.  See \"Patient Info\" tab in inbasket, or \"Choose Columns\" in Meds & Orders section of the refill encounter.           Short-Acting Beta Agonist Inhalers Protocol  Passed - 3/13/2020 11:34 AM        Passed - Patient is age 12 or older        Passed - Asthma control assessment score within normal limits in last 6 months     Please review ACT score.           Passed - Medication is active on med list        Passed - Recent (6 mo) or future (30 days) visit within the authorizing provider's specialty     Patient had office visit in the last 6 months or has a visit in the next 30 days with authorizing provider or within the authorizing provider's specialty.  See \"Patient Info\" tab in inbasket, or \"Choose Columns\" in Meds & Orders section of the refill encounter.               Last Written Prescription Date:  4/1/15  Last Fill Quantity: 1,  # refills: 1   Last office visit: 2/10/2020 with prescribing provider:  Vidya   Future Office Visit:      "

## 2020-03-15 RX ORDER — ALBUTEROL SULFATE 90 UG/1
2 AEROSOL, METERED RESPIRATORY (INHALATION) EVERY 6 HOURS PRN
Qty: 1 INHALER | Refills: 1 | Status: SHIPPED | OUTPATIENT
Start: 2020-03-15

## 2020-03-18 ENCOUNTER — MYC REFILL (OUTPATIENT)
Dept: FAMILY MEDICINE | Facility: CLINIC | Age: 75
End: 2020-03-18

## 2020-03-18 DIAGNOSIS — F41.9 ANXIETY: ICD-10-CM

## 2020-03-18 RX ORDER — LORAZEPAM 0.5 MG/1
0.5 TABLET ORAL EVERY 8 HOURS PRN
Qty: 20 TABLET | Refills: 0 | Status: CANCELLED | OUTPATIENT
Start: 2020-03-18

## 2020-03-19 ENCOUNTER — E-VISIT (OUTPATIENT)
Dept: FAMILY MEDICINE | Facility: CLINIC | Age: 75
End: 2020-03-19
Payer: MEDICARE

## 2020-03-19 DIAGNOSIS — F41.9 ANXIETY: ICD-10-CM

## 2020-03-19 PROCEDURE — 99207 ZZC NO BILLABLE SERVICE THIS VISIT: CPT | Performed by: FAMILY MEDICINE

## 2020-03-19 ASSESSMENT — ANXIETY QUESTIONNAIRES
7. FEELING AFRAID AS IF SOMETHING AWFUL MIGHT HAPPEN: SEVERAL DAYS
GAD7 TOTAL SCORE: 3
4. TROUBLE RELAXING: NOT AT ALL
2. NOT BEING ABLE TO STOP OR CONTROL WORRYING: SEVERAL DAYS
GAD7 TOTAL SCORE: 3
7. FEELING AFRAID AS IF SOMETHING AWFUL MIGHT HAPPEN: SEVERAL DAYS
5. BEING SO RESTLESS THAT IT IS HARD TO SIT STILL: NOT AT ALL
1. FEELING NERVOUS, ANXIOUS, OR ON EDGE: SEVERAL DAYS
3. WORRYING TOO MUCH ABOUT DIFFERENT THINGS: NOT AT ALL
6. BECOMING EASILY ANNOYED OR IRRITABLE: NOT AT ALL

## 2020-03-19 NOTE — TELEPHONE ENCOUNTER
Requested Prescriptions   Pending Prescriptions Disp Refills     LORazepam (ATIVAN) 0.5 MG tablet 20 tablet 0     Sig: Take 1 tablet (0.5 mg) by mouth every 8 hours as needed for anxiety       There is no refill protocol information for this order        LORazepam (ATIVAN) 0.5 MG tablet   Last Written Prescription Date:  11/03/2017  Last Fill Quantity: 20 tablet,  # refills: 0   Last office visit: 2/10/2020 with prescribing provider:  ALEXIA Dasilva   Future Office Visit:      Fanny RIVAS (R) (M)

## 2020-03-20 RX ORDER — LORAZEPAM 0.5 MG/1
0.5 TABLET ORAL EVERY 8 HOURS PRN
Qty: 20 TABLET | Refills: 0 | Status: SHIPPED | OUTPATIENT
Start: 2020-03-20 | End: 2020-10-07

## 2020-03-20 ASSESSMENT — ANXIETY QUESTIONNAIRES: GAD7 TOTAL SCORE: 3

## 2020-06-04 ENCOUNTER — MYC MEDICAL ADVICE (OUTPATIENT)
Dept: FAMILY MEDICINE | Facility: CLINIC | Age: 75
End: 2020-06-04

## 2020-06-05 ENCOUNTER — VIRTUAL VISIT (OUTPATIENT)
Dept: FAMILY MEDICINE | Facility: CLINIC | Age: 75
End: 2020-06-05
Payer: MEDICARE

## 2020-06-05 DIAGNOSIS — Z20.822 COVID-19 RULED OUT BY LABORATORY TESTING: ICD-10-CM

## 2020-06-05 DIAGNOSIS — L73.9 FOLLICULITIS: Primary | ICD-10-CM

## 2020-06-05 PROCEDURE — 99214 OFFICE O/P EST MOD 30 MIN: CPT | Mod: 95 | Performed by: FAMILY MEDICINE

## 2020-06-05 RX ORDER — CEPHALEXIN 500 MG/1
500 CAPSULE ORAL 3 TIMES DAILY
Qty: 15 CAPSULE | Refills: 0 | Status: SHIPPED | OUTPATIENT
Start: 2020-06-05 | End: 2021-01-19

## 2020-06-05 NOTE — PATIENT INSTRUCTIONS
Antibiotic as directed    Soak in warm tub     Let me know if not better in one week and sooner if worse.     RN will call to set up covid testing

## 2020-06-05 NOTE — PROGRESS NOTES
"Ruba Farmer is a 74 year old female who is being evaluated via a billable video visit.      The patient has been notified of following:     \"This video visit will be conducted via a call between you and your physician/provider. We have found that certain health care needs can be provided without the need for an in-person physical exam.  This service lets us provide the care you need with a video conversation.  If a prescription is necessary we can send it directly to your pharmacy.  If lab work is needed we can place an order for that and you can then stop by our lab to have the test done at a later time.    Video visits are billed at different rates depending on your insurance coverage.  Please reach out to your insurance provider with any questions.    If during the course of the call the physician/provider feels a video visit is not appropriate, you will not be charged for this service.\"    Patient has given verbal consent for Video visit? Yes    How would you like to obtain your AVS? AlizaHanna    Patient would like the video invitation sent by: Text to cell phone: [phone]    Will anyone else be joining your video visit? No    Subjective     Ruba Farmer is a 74 year old female who presents today via video visit for the following health issues:    HPI  Chief Complaint   Patient presents with     Derm Problem     recheck possible ingrown hair pubic hair           Duration: 5 days    Description (location/character/radiation): pubic area    Intensity:  moderate    Accompanying signs and symptoms: small lump and redness,    History (similar episodes/previous evaluation): was told she had an ingrown hair at last visit    Precipitating or alleviating factors: None    Therapies tried and outcome: rubbing alcohol and OTC antibiotic cream          Video Start Time: 2:33 PM        Thinks she may have had covid at end of Jan had long flu like illness with loss of senses   She would like serology     Reviewed and " "updated as needed this visit by Provider  Tobacco  Allergies  Meds  Problems  Med Hx  Surg Hx  Fam Hx         Review of Systems   Constitutional, HEENT, cardiovascular, pulmonary, gi and gu systems are negative, except as otherwise noted.      Objective    There were no vitals taken for this visit.  Estimated body mass index is 29.74 kg/m  as calculated from the following:    Height as of 2/10/20: 1.727 m (5' 8\").    Weight as of 2/10/20: 88.7 kg (195 lb 9.6 oz).  Physical Exam     GENERAL: Healthy, alert and no distress  EYES: Eyes grossly normal to inspection.  No discharge or erythema, or obvious scleral/conjunctival abnormalities.  RESP: No audible wheeze, cough, or visible cyanosis.  No visible retractions or increased work of breathing.    SKIN: Visible skin clear. No significant rash, abnormal pigmentation or lesions.  NEURO: Cranial nerves grossly intact.  Mentation and speech appropriate for age.  PSYCH: Mentation appears normal, affect normal/bright, judgement and insight intact, normal speech and appearance well-groomed.      Diagnostic Test Results:  Labs reviewed in Epic        Assessment & Plan     1. Folliculitis    - cephALEXin (KEFLEX) 500 MG capsule; Take 1 capsule (500 mg) by mouth 3 times daily  Dispense: 15 capsule; Refill: 0    2. COVID-19 ruled out by laboratory testing  Suspected previous infection   - COVID-19 Virus (Coronavirus) Antibody; Future       Patient Instructions   Antibiotic as directed    Soak in warm tub     Let me know if not better in one week and sooner if worse.     RN will call to set up covid testing       Return in about 1 week (around 6/12/2020), or if symptoms worsen or fail to improve.    Xiao Dasilva MD  [facility]      Video-Visit Details    Type of service:  Video Visit    Video End Time:2:48 PM    Originating Location (pt. Location): Home    Distant Location (provider location):  [facility]     Platform used for " Video Visit: Doximity    Return in about 1 week (around 6/12/2020), or if symptoms worsen or fail to improve.       Xiao Dasilva MD

## 2020-06-06 DIAGNOSIS — Z20.822 COVID-19 RULED OUT BY LABORATORY TESTING: ICD-10-CM

## 2020-06-06 PROCEDURE — 86769 SARS-COV-2 COVID-19 ANTIBODY: CPT | Mod: 90 | Performed by: FAMILY MEDICINE

## 2020-06-06 PROCEDURE — 36415 COLL VENOUS BLD VENIPUNCTURE: CPT | Performed by: FAMILY MEDICINE

## 2020-06-06 NOTE — LETTER
June 9, 2020        Ruba Farmer  [address] [address] [id number]      COVID-19 Antibody Screen   Date Value Ref Range Status   06/06/2020 Negative  Final     Comment:     No COVID-19 antibodies detected.  Patients within 10 days of symptom onset for   COVID-19 may not produce sufficient levels of detectable antibodies.    Immunocompromised COVID-19 patients may take longer to develop antibodies.         You have tested NEGATIVE for COVID-19 antibodies. This suggests you have not had or been exposed to COVID-19. But it does not mean that for sure.     The test finds antibodies in most people 10 days after they get sick. For some people, it takes longer than 10 days for antibodies to show up. Others may never show antibodies against COVID-19, especially if they have weak immune systems.    If you have COVID-19 symptoms now, please stay home and away from others.     What is antibody testing?    This is a kind of blood test. We take a small sample of your blood, and then test it for something called  antibodies.      Your body makes antibodies to fight infection. If your blood has antibodies for a certain germ, it means you ve been infected with that germ in the past.     Sometimes, antibodies stay in your body for years after you ve had the infection. They can be there even if the germ didn t make you sick. They are a sign that your body fought off the infection.    Will this test find antibodies in everyone who s had COVID-19?    No. The test finds antibodies in most people 10 days after they get sick. For some people, it takes longer than 10 days for antibodies to show up. Others may never show antibodies against COVID-19, especially if they have weak immune systems.    What does it mean if the test finds COVID-19 antibodies?    If we find these antibodies, it suggests:     This person has had the virus.     Their body s immune system fought the virus.     We don t know if this will help protect  someone from getting COVID-19 again. Scientists are still learning about this.    What are the signs of COVID-19?    Signs of COVID-19 can appear from 2 to 14 days (up to 2 weeks) after you re infected. Some people have no symptoms or only mild symptoms. Others get very sick. The most common symptoms are:          Cough    Shortness of breath or trouble breathing  Or at least 2 of these symptoms:    Fever    Chills    Repeated shaking with chills    Muscle pain    Headache    Sore throat    Losing your sense of taste or smell    You may have other symptoms. Please contact your doctor or clinic for any symptoms that worry you.    Where can I get more information?     To learn the [facility] guidelines for staying home, please visit the [place] Department of Health website at [email or web address]    To learn more about COVID-19 and how to care for yourself at home, please visit the CDC website at https://www.cdc.gov/coronavirus/2019-ncov/about/steps-when-sick.html    For more options for care at [facility], please visit our website at [email or web address]    [facility] ([facility]) COVID-19 Hotline:  [phone]

## 2020-06-08 LAB
COVID-19 SPIKE RBD ABY TITER: NORMAL
COVID-19 SPIKE RBD ABY: NEGATIVE

## 2020-08-10 NOTE — TELEPHONE ENCOUNTER
Doxycycline 100      Last Written Prescription Date: 2/3/17  Last Fill Quantity: 14,  # refills: 0   Last Office Visit with [facility], [facility] or [facility] prescribing provider: 11/18/16      Thank You!  Heaven Okeefe  [facility]  P: [phone] F:[phone]                                              Patient denies sob, n/v/d, abd pain. States that tonight he came in because he \"can't take it anymore. \" Denies recent travel.

## 2020-09-08 ENCOUNTER — IMMUNIZATION (OUTPATIENT)
Dept: FAMILY MEDICINE | Facility: CLINIC | Age: 75
End: 2020-09-08
Payer: MEDICARE

## 2020-09-08 DIAGNOSIS — Z23 NEED FOR PROPHYLACTIC VACCINATION AND INOCULATION AGAINST INFLUENZA: Primary | ICD-10-CM

## 2020-09-08 PROCEDURE — G0008 ADMIN INFLUENZA VIRUS VAC: HCPCS

## 2020-09-08 PROCEDURE — 90662 IIV NO PRSV INCREASED AG IM: CPT

## 2020-09-29 ENCOUNTER — ALLIED HEALTH/NURSE VISIT (OUTPATIENT)
Dept: FAMILY MEDICINE | Facility: CLINIC | Age: 75
End: 2020-09-29
Payer: MEDICARE

## 2020-09-29 DIAGNOSIS — Z23 NEED FOR VACCINATION: Primary | ICD-10-CM

## 2020-09-29 PROCEDURE — 90471 IMMUNIZATION ADMIN: CPT

## 2020-09-29 PROCEDURE — 90750 HZV VACC RECOMBINANT IM: CPT

## 2020-09-29 PROCEDURE — 99207 ZZC NO CHARGE NURSE ONLY: CPT

## 2020-10-07 ENCOUNTER — MYC MEDICAL ADVICE (OUTPATIENT)
Dept: FAMILY MEDICINE | Facility: CLINIC | Age: 75
End: 2020-10-07

## 2020-10-07 DIAGNOSIS — G89.29 CHRONIC LOW BACK PAIN, UNSPECIFIED BACK PAIN LATERALITY, UNSPECIFIED WHETHER SCIATICA PRESENT: Primary | ICD-10-CM

## 2020-10-07 DIAGNOSIS — M54.50 CHRONIC LOW BACK PAIN, UNSPECIFIED BACK PAIN LATERALITY, UNSPECIFIED WHETHER SCIATICA PRESENT: Primary | ICD-10-CM

## 2020-10-07 DIAGNOSIS — Z87.440 HISTORY OF URINARY TRACT INFECTION: ICD-10-CM

## 2020-10-07 DIAGNOSIS — F41.9 ANXIETY: ICD-10-CM

## 2020-10-07 RX ORDER — LORAZEPAM 0.5 MG/1
0.5 TABLET ORAL EVERY 8 HOURS PRN
Qty: 20 TABLET | Refills: 0 | Status: SHIPPED | OUTPATIENT
Start: 2020-10-07 | End: 2023-01-10

## 2020-10-07 RX ORDER — SULFAMETHOXAZOLE/TRIMETHOPRIM 800-160 MG
TABLET ORAL
Qty: 30 TABLET | Refills: 1 | Status: SHIPPED | OUTPATIENT
Start: 2020-10-07 | End: 2023-06-07

## 2020-10-15 ENCOUNTER — HOSPITAL ENCOUNTER (OUTPATIENT)
Dept: PHYSICAL THERAPY | Facility: CLINIC | Age: 75
Setting detail: THERAPIES SERIES
End: 2020-10-15
Attending: FAMILY MEDICINE
Payer: MEDICARE

## 2020-10-15 DIAGNOSIS — M54.50 CHRONIC LOW BACK PAIN, UNSPECIFIED BACK PAIN LATERALITY, UNSPECIFIED WHETHER SCIATICA PRESENT: ICD-10-CM

## 2020-10-15 DIAGNOSIS — G89.29 CHRONIC LOW BACK PAIN, UNSPECIFIED BACK PAIN LATERALITY, UNSPECIFIED WHETHER SCIATICA PRESENT: ICD-10-CM

## 2020-10-15 PROCEDURE — 97161 PT EVAL LOW COMPLEX 20 MIN: CPT | Mod: GP,GT | Performed by: PHYSICAL THERAPIST

## 2020-10-15 NOTE — PROGRESS NOTES
10/15/20 1300   General Information   Type of Visit Initial OP Ortho PT Evaluation   Start of Care Date 10/15/20   Referring Physician Dr. Ivan   Patient/Family Goals Statement HEP to address LBP via telehealth due to COVID pandemic   Orders Evaluate and Treat   Date of Order 10/07/20   Certification Required? Yes   Medical Diagnosis Chronic low back pain, unspecified back pain laterality, unspecified whether sciatica present   Body Part(s)   Body Part(s) Lumbar Spine/SI   Presentation and Etiology   Pertinent history of current problem (include personal factors and/or comorbidities that impact the POC) Patient reports: she has experienced chronic LBP which has flared up in the past weeks.  Wanting to address this with a home exercise program.     Impairments B. Decreased WB tolerance;D. Decreased ROM;E. Decreased flexibility;F. Decreased strength and endurance   Functional Limitations perform activities of daily living;perform desired leisure / sports activities   Symptom Location left SI joint; mid lumbar spine   How/Where did it occur From Degenerative Joint Disease;From insidious onset   Onset date of current episode/exacerbation 09/15/20   Chronicity Recurrent   Pain quality C. Aching   Frequency of pain/symptoms C. With activity   Pain/symptoms are: The same all the time   Pain/symptoms exacerbated by G. Certain positions;I. Bending;K. Home tasks   Pain/symptoms eased by C. Rest   Progression of symptoms since onset: Worsened   Prior Level of Function   Prior Level of Function-Mobility independent   Prior Level of Function-ADLs independent   Current Level of Function   Current Community Support Family/friend caregiver   Patient role/employment history A. Employed   Living environment [facility]   Fall Risk Screen   Fall screen completed by PT   Have you fallen 2 or more times in the past year? No   Have you fallen and had an injury in the past year? No   Is patient a fall risk? No   Lumbar  Spine/SI Objective Findings   Posture mild danni rounded shoulder / increased thoracic kyphosis   Flexion ROM feels flexed after prolonged sitting / sleeping   Hip Screen limited mobility however squats daily   Planned Therapy Interventions   Planned Therapy Interventions ADL retraining;balance training;joint mobilization;manual therapy;motor coordination training;neuromuscular re-education;ROM;strengthening;stretching   Clinical Impression   Criteria for Skilled Therapeutic Interventions Met yes, treatment indicated   PT Diagnosis Recurrent LBP with underlying stenosis; weakness   Influenced by the following impairments pain; weakness; impaired ROM; impaired posture   Functional limitations due to impairments difficulty with sleeping, prolonged positions, standing after car ride   Clinical Presentation Stable/Uncomplicated   Clinical Presentation Rationale (+) motivation; previous success with PT  (-) chronic / recurrent   Clinical Decision Making (Complexity) Low complexity   Therapy Frequency other (see comments)   Predicted Duration of Therapy Intervention (days/wks) 1-3 visits as needed over 12 weeks   Risk & Benefits of therapy have been explained Yes   Patient, Family & other staff in agreement with plan of care Yes   Clinical Impression Comments Marcy is seen today via telehealth due to history of lung compromise and COVID concerns.  She will benefit from skilled PT to address the above impairments and functional limitations.   Education Assessment   Preferred Learning Style Listening;Demonstration;Pictures/video   Barriers to Learning No barriers   ORTHO GOALS   PT Ortho Eval Goals 1;2   Ortho Goal 1   Goal Description Patient will be independent with her HEP To reduce future occurrence of pain and disability.   Target Date 12/10/20   Ortho Goal 2   Goal Description Patient will be able to drive for 2 hrs with minimal <4/10 LBP upon standing.   Target Date 01/07/21   Total Evaluation Time   PT Eval, Low  Complexity Minutes (15627) 25         Myrna Nascimento, PT, DTP, SCS  Doctor of Physical Therapy #[id number]  [facility]  [phone]  [email or web address]

## 2020-10-15 NOTE — PROGRESS NOTES
[facility]          OUTPATIENT PHYSICAL THERAPY ORTHOPEDIC EVALUATION  PLAN OF TREATMENT FOR OUTPATIENT REHABILITATION  (COMPLETE FOR INITIAL CLAIMS ONLY)  Patient's Last Name, First Name, M.I.  YOB: 1945  Ruba Faremr    Provider s Name:  [facility]   Medical Record No.  [id number]   Start of Care Date:  10/15/20   Onset Date:  09/15/20   Type:     _X__PT   ___OT   ___SLP Medical Diagnosis:  Chronic low back pain, unspecified back pain laterality, unspecified whether sciatica present     PT Diagnosis:  Recurrent LBP with underlying stenosis; weakness   Visits from SOC:  1      _________________________________________________________________________________  Plan of Treatment/Functional Goals:  ADL retraining, balance training, joint mobilization, manual therapy, motor coordination training, neuromuscular re-education, ROM, strengthening, stretching           Goals     Goal Description: Patient will be independent with her HEP To reduce future occurrence of pain and disability.  Target Date: 12/10/20       Goal Description: Patient will be able to drive for 2 hrs with minimal <4/10 LBP upon standing.  Target Date: 01/07/21                               Therapy Frequency:  other (see comments)  Predicted Duration of Therapy Intervention:  1-3 visits as needed over 8-12 weeks    Myrna Nascimento, PT                 I CERTIFY THE NEED FOR THESE SERVICES FURNISHED UNDER        THIS PLAN OF TREATMENT AND WHILE UNDER MY CARE     (Physician co-signature of this document indicates review and certification of the therapy plan).                       Certification Date From:  10/15/20   Certification Date To:  12/10/20    Referring Provider:  Dr. Ivan    Initial Assessment        See Epic Evaluation Start of Care Date: 10/15/20

## 2020-12-28 DIAGNOSIS — J47.1 BRONCHIECTASIS WITH ACUTE EXACERBATION (H): ICD-10-CM

## 2020-12-28 RX ORDER — AZITHROMYCIN 250 MG/1
TABLET, FILM COATED ORAL
Qty: 6 TABLET | Refills: 0 | Status: CANCELLED | OUTPATIENT
Start: 2020-12-28

## 2021-01-13 ENCOUNTER — MYC MEDICAL ADVICE (OUTPATIENT)
Dept: FAMILY MEDICINE | Facility: CLINIC | Age: 76
End: 2021-01-13

## 2021-01-13 DIAGNOSIS — J47.1 BRONCHIECTASIS WITH ACUTE EXACERBATION (H): ICD-10-CM

## 2021-01-13 RX ORDER — AZITHROMYCIN 250 MG/1
TABLET, FILM COATED ORAL
Qty: 6 TABLET | Refills: 0 | Status: SHIPPED | OUTPATIENT
Start: 2021-01-13 | End: 2021-01-19

## 2021-01-19 ENCOUNTER — VIRTUAL VISIT (OUTPATIENT)
Dept: FAMILY MEDICINE | Facility: CLINIC | Age: 76
End: 2021-01-19
Payer: MEDICARE

## 2021-01-19 DIAGNOSIS — J47.1 BRONCHIECTASIS WITH ACUTE EXACERBATION (H): ICD-10-CM

## 2021-01-19 PROCEDURE — 99215 OFFICE O/P EST HI 40 MIN: CPT | Mod: 95 | Performed by: FAMILY MEDICINE

## 2021-01-19 RX ORDER — AZITHROMYCIN 250 MG/1
TABLET, FILM COATED ORAL
Qty: 6 TABLET | Refills: 10 | Status: SHIPPED | OUTPATIENT
Start: 2021-01-19 | End: 2022-06-07

## 2021-01-19 NOTE — PROGRESS NOTES
"Marcy is a 75 year old who is being evaluated via a billable video visit.      How would you like to obtain your AVS? MyChart  If the video visit is dropped, the invitation should be resent by: Rowena  Will anyone else be joining your video visit? No    Video Start Time: 10:08 AM  Assessment & Plan     Bronchiectasis with acute exacerbation (H)  Pt with remote history of pertussis and fungal lung infection as adolescent has had multiple pneumonias in the past per her report eval by pulmonary and dx with the above   zpak works well uses it once per year generally only now seems like it is not working as well   She has \"sensitive\" lungs   Has albuterol and this helps some   Plan to use zpak if needed this year get PFTs to help delineate function would consider CT scan   - azithromycin (ZITHROMAX) 250 MG tablet; TAKE 2 TABLETS BY MOUTH ON THE FIRST DAY, THEN TAKE ONE TABLET BY MOUTH ONCE DAILY FOR 4 DAYS  - General PFT Lab (Please always keep checked); Future              40 minutes spent on the date of the encounter doing chart review, review of test results, patient visit and documentation          BMI:   Estimated body mass index is 29.74 kg/m  as calculated from the following:    Height as of 2/10/20: 1.727 m (5' 8\").    Weight as of 2/10/20: 88.7 kg (195 lb 9.6 oz).         Patient Instructions   Zpak as we discussed at the very onset of sympotms and then may repeat pack x1 if needed   Please update me via [employer] if this has not helped   We would be looking at more testing or pulmonology at that point     Albuterol before activity     Pulmonary function testing is our next step, they will call you to set that up      Return in about 1 month (around 2/19/2021) for via [employer] update, with me.    Xiao Dasilva MD  [facility]    Subjective     Marcy is a 75 year old who presents to clinic today for the following health issues     HPI       Would like to discuss plan for her " Bronchiectasis  Any infection in the lungs things get bad   Never clear totally   mucinex every am and allergy pill every am sudaphed as well   Sometimes takes a mucinex at bedtime   Steroid spray for nose every day   This above regimen has worked for the last one year with no infections    Recent cough and mucous and was feeling like she was getting sick  Took zpak and got better for a day but not all the way gone   Took another course and zpak helped     Would like to keep on hand    Review of Systems   Constitutional, HEENT, cardiovascular, pulmonary, gi and gu systems are negative, except as otherwise noted.      Objective           Vitals:  No vitals were obtained today due to virtual visit.    Physical Exam   GENERAL: Healthy, alert and no distress  EYES: Eyes grossly normal to inspection.  No discharge or erythema, or obvious scleral/conjunctival abnormalities.  RESP: No audible wheeze, cough, or visible cyanosis.  No visible retractions or increased work of breathing.    SKIN: Visible skin clear. No significant rash, abnormal pigmentation or lesions.  NEURO: Cranial nerves grossly intact.  Mentation and speech appropriate for age.  PSYCH: Mentation appears normal, affect normal/bright, judgement and insight intact, normal speech and appearance well-groomed.                Video-Visit Details    Type of service:  Video Visit    Video End Time:1030am    Originating Location (pt. Location): Home    Distant Location (provider location):  [facility]     Platform used for Video Visit: Catglobe

## 2021-01-19 NOTE — PATIENT INSTRUCTIONS
Zpak as we discussed at the very onset of sympotms and then may repeat pack x1 if needed   Please update me via [employer] if this has not helped   We would be looking at more testing or pulmonology at that point     Albuterol before activity     Pulmonary function testing is our next step, they will call you to set that up

## 2021-02-04 NOTE — PROGRESS NOTES
Outpatient Physical Therapy Discharge Note     Patient: Ruba Farmer  : 1945    Beginning/End Dates of Reporting Period:  10/15/21 to 2021    Referring Provider: Dr. Queen-Vidya    Therapy Diagnosis: chronic LBP     Client Self Report: Patient reports: she has experienced chronic LBP which has flared up in the past weeks.  Wanting to address this with a home exercise program.          Goals:  Goal Identifier     Goal Description Patient will be independent with her HEP To reduce future occurrence of pain and disability.   Target Date 12/10/20   Date Met      Progress:     Goal Identifier     Goal Description Patient will be able to drive for 2 hrs with minimal <4/10 LBP upon standing.   Target Date 21   Date Met      Progress:       Progress Toward Goals:   Progress this reporting period: patient attended 1 video visit to re-assess and update HEP; unable to assess progress further.      Plan:  Discharge from therapy.    Discharge:    Reason for Discharge: Patient chooses to discontinue therapy.    Equipment Issued: theraband    Discharge Plan: Patient to continue home program.    Myrna Nascimento, PT, DTP, SCS  Doctor of Physical Therapy #[id number]  [facility]  [phone]  [email or web address]

## 2021-02-05 ENCOUNTER — IMMUNIZATION (OUTPATIENT)
Dept: NURSING | Facility: CLINIC | Age: 76
End: 2021-02-05
Payer: MEDICARE

## 2021-02-05 PROCEDURE — 91300 PR COVID VAC PFIZER DIL RECON 30 MCG/0.3 ML IM: CPT

## 2021-02-05 PROCEDURE — 0001A PR COVID VAC PFIZER DIL RECON 30 MCG/0.3 ML IM: CPT

## 2021-02-10 ENCOUNTER — MYC MEDICAL ADVICE (OUTPATIENT)
Dept: FAMILY MEDICINE | Facility: CLINIC | Age: 76
End: 2021-02-10

## 2021-02-26 ENCOUNTER — IMMUNIZATION (OUTPATIENT)
Dept: NURSING | Facility: CLINIC | Age: 76
End: 2021-02-26
Attending: FAMILY MEDICINE
Payer: MEDICARE

## 2021-02-26 PROCEDURE — 91300 PR COVID VAC PFIZER DIL RECON 30 MCG/0.3 ML IM: CPT

## 2021-02-26 PROCEDURE — 0002A PR COVID VAC PFIZER DIL RECON 30 MCG/0.3 ML IM: CPT

## 2021-04-10 ENCOUNTER — HEALTH MAINTENANCE LETTER (OUTPATIENT)
Age: 76
End: 2021-04-10

## 2021-04-14 ENCOUNTER — HOSPITAL ENCOUNTER (OUTPATIENT)
Dept: RESPIRATORY THERAPY | Facility: CLINIC | Age: 76
Discharge: HOME | End: 2021-04-14
Attending: INTERNAL MEDICINE | Admitting: INTERNAL MEDICINE
Payer: MEDICARE

## 2021-04-14 DIAGNOSIS — J47.1 BRONCHIECTASIS WITH ACUTE EXACERBATION (H): ICD-10-CM

## 2021-04-14 PROCEDURE — 94640 AIRWAY INHALATION TREATMENT: CPT

## 2021-04-14 PROCEDURE — 250N000009 HC RX 250: Performed by: FAMILY MEDICINE

## 2021-04-14 PROCEDURE — 94729 DIFFUSING CAPACITY: CPT | Mod: 26 | Performed by: INTERNAL MEDICINE

## 2021-04-14 PROCEDURE — 94060 EVALUATION OF WHEEZING: CPT

## 2021-04-14 PROCEDURE — 94060 EVALUATION OF WHEEZING: CPT | Mod: 26 | Performed by: INTERNAL MEDICINE

## 2021-04-14 PROCEDURE — 94729 DIFFUSING CAPACITY: CPT

## 2021-04-14 RX ORDER — ALBUTEROL SULFATE 0.83 MG/ML
2.5 SOLUTION RESPIRATORY (INHALATION) ONCE
Status: COMPLETED | OUTPATIENT
Start: 2021-04-14 | End: 2021-04-14

## 2021-04-14 RX ADMIN — ALBUTEROL SULFATE 2.5 MG: 2.5 SOLUTION RESPIRATORY (INHALATION) at 11:55

## 2021-04-15 LAB
DLCOUNC-%PRED-PRE: 92 %
DLCOUNC-PRE: 19.61 ML/MIN/MMHG
DLCOUNC-PRED: 21.12 ML/MIN/MMHG
ERV-%PRED-PRE: 67 %
ERV-PRE: 0.35 L
ERV-PRED: 0.52 L
EXPTIME-PRE: 7.18 SEC
FEF2575-%PRED-POST: 102 %
FEF2575-%PRED-PRE: 100 %
FEF2575-POST: 1.76 L/SEC
FEF2575-PRE: 1.73 L/SEC
FEF2575-PRED: 1.72 L/SEC
FEFMAX-%PRED-PRE: 99 %
FEFMAX-PRE: 5.72 L/SEC
FEFMAX-PRED: 5.72 L/SEC
FEV1-%PRED-PRE: 103 %
FEV1-PRE: 2.19 L
FEV1FEV6-PRE: 76 %
FEV1FEV6-PRED: 78 %
FEV1FVC-PRE: 76 %
FEV1FVC-PRED: 78 %
FEV1SVC-PRE: 73 %
FEV1SVC-PRED: 64 %
FIFMAX-PRE: 2.72 L/SEC
FVC-%PRED-PRE: 104 %
FVC-PRE: 2.88 L
FVC-PRED: 2.75 L
IC-%PRED-PRE: 91 %
IC-PRE: 2.58 L
IC-PRED: 2.81 L
VA-%PRED-PRE: 92 %
VA-PRE: 4.84 L
VC-%PRED-PRE: 90 %
VC-PRE: 3 L
VC-PRED: 3.34 L

## 2021-06-05 ENCOUNTER — HEALTH MAINTENANCE LETTER (OUTPATIENT)
Age: 76
End: 2021-06-05

## 2021-06-11 NOTE — TELEPHONE ENCOUNTER
Filled on 6/9/2021 @ [facility].    No PA needed.        EPA will close encounter.    She has asthma  Ayanna Bernard RN

## 2021-07-26 ENCOUNTER — HOSPITAL ENCOUNTER (OUTPATIENT)
Dept: PHYSICAL THERAPY | Facility: CLINIC | Age: 76
Setting detail: THERAPIES SERIES
End: 2021-07-26
Attending: FAMILY MEDICINE
Payer: MEDICARE

## 2021-07-26 DIAGNOSIS — G89.29 CHRONIC BILATERAL LOW BACK PAIN WITHOUT SCIATICA: ICD-10-CM

## 2021-07-26 DIAGNOSIS — M54.50 CHRONIC BILATERAL LOW BACK PAIN WITHOUT SCIATICA: ICD-10-CM

## 2021-07-26 PROCEDURE — 97110 THERAPEUTIC EXERCISES: CPT | Mod: GP | Performed by: PHYSICAL THERAPIST

## 2021-07-26 PROCEDURE — 97161 PT EVAL LOW COMPLEX 20 MIN: CPT | Mod: GP | Performed by: PHYSICAL THERAPIST

## 2021-07-26 NOTE — PROGRESS NOTES
07/26/21 1300   General Information   Type of Visit Initial OP Ortho PT Evaluation   Start of Care Date 07/26/21   Referring Physician Dr. Xiao Ivan   Orders Evaluate and Treat   Date of Order 07/14/21   Certification Required? Yes   Medical Diagnosis Chronic bilateral low back pain without sciatica   Body Part(s)   Body Part(s) Lumbar Spine/SI   Presentation and Etiology   Pertinent history of current problem (include personal factors and/or comorbidities that impact the POC) Pt reports chronic LBP has flared up.  Also feeling danni knee pain.  Feels the pain mostly with sit <> stand.     Impairments A. Pain;D. Decreased ROM;E. Decreased flexibility;F. Decreased strength and endurance   Functional Limitations perform activities of daily living;perform required work activities;perform desired leisure / sports activities   Symptom Location danni L4-5   How/Where did it occur With repetition/overuse;From insidious onset;At home   Onset date of current episode/exacerbation 05/26/21   Chronicity Recurrent   Pain rating (0-10 point scale) Best (/10);Worst (/10)   Pain quality B. Dull   Frequency of pain/symptoms B. Intermittent   Pain/symptoms are: The same all the time   Pain/symptoms exacerbated by B. Walking;G. Certain positions;H. Overhead reach   Pain/symptoms eased by A. Sitting;G. Heat   Progression of symptoms since onset: Worsened   Prior Level of Function   Prior Level of Function-Mobility Independent   Prior Level of Function-ADLs Independent   Current Level of Function   Current Community Support Family/friend caregiver   Patient role/employment history F. Retired   Fall Risk Screen   Fall screen completed by PT   Have you fallen 2 or more times in the past year? No   Have you fallen and had an injury in the past year? No   Is patient a fall risk? No   Abuse Screen (yes response referral indicated)   Feels Unsafe at Home or Work/School no   Feels Threatened by Someone no   Does Anyone Try to Keep  You From Having Contact with Others or Doing Things Outside Your Home? no   Physical Signs of Abuse Present no   Lumbar Spine/SI Objective Findings   Flexion ROM fingertips to mid shin - flat lumbar spine little to no flexion   Extension ROM 75% pain free   Pelvic Screen limited left hip mobility in flexion and danni rotation   Hip Abduction Strength right=4/5; left=3+/5   Planned Therapy Interventions   Planned Therapy Interventions ADL retraining;balance training;gait training;joint mobilization;manual therapy;motor coordination training;neuromuscular re-education;ROM;strengthening;stretching   Planned Modality Interventions   Planned Modality Interventions Cryotherapy   Clinical Impression   Criteria for Skilled Therapeutic Interventions Met yes, treatment indicated   PT Diagnosis chronic LBP with weak glute med, hypertonic QL and latissimus with underlying stenosis   Influenced by the following impairments pain; weakness; ROM loss; impaired gait   Functional limitations due to impairments difficulty with prolonged sitting and standing, at times sleeping   Clinical Presentation Stable/Uncomplicated   Clinical Presentation Rationale (+) motivation; previous success with PT  (-) recurrent; comorbidities   Clinical Decision Making (Complexity) Low complexity   Therapy Frequency other (see comments)   Predicted Duration of Therapy Intervention (days/wks) 1x every 2-3 weeks 12 weeks total   Risk & Benefits of therapy have been explained Yes   Patient, Family & other staff in agreement with plan of care Yes   Clinical Impression Comments Marcy arrives today with recurrent LBP, this episode presenting as muscular related to weak gluteal and hypertonic lats / QL.  She will benefit from skilled PT to address the above impairments and functional limitations.   Education Assessment   Preferred Learning Style Listening;Demonstration;Pictures/video   Barriers to Learning No barriers   ORTHO GOALS   PT Ortho Eval Goals 1;2;3    Ortho Goal 1   Goal Description Patient will be independent with her HEP to reduce future occurrence of pain and disability.   Target Date 08/16/21   Ortho Goal 2   Goal Description Patient will have >=4+/5 danni hip ABD strength to stabilize the knees and back with ADL's.   Target Date 10/18/21   Ortho Goal 3   Goal Description Patient will be able to sit for 30 min, then stand without LBP.   Target Date 10/18/21   Total Evaluation Time   PT Eval, Low Complexity Minutes (48813) 20   Therapy Certification   Certification date from 07/26/21   Certification date to 10/18/21   Medical Diagnosis Chronic bilateral low back pain without sciatica       Myrna Nascimento, PT, DTP, SCS  Doctor of Physical Therapy #[id number]  [facility]  [phone]  [email or web address]

## 2021-07-26 NOTE — PROGRESS NOTES
[facility]          OUTPATIENT PHYSICAL THERAPY ORTHOPEDIC EVALUATION  PLAN OF TREATMENT FOR OUTPATIENT REHABILITATION  (COMPLETE FOR INITIAL CLAIMS ONLY)  Patient's Last Name, First Name, M.I.  YOB: 1945  Ruba Farmer    Provider s Name:  [facility]   Medical Record No.  [id number]   Start of Care Date:  07/26/21   Onset Date:  05/26/21   Type:     _X__PT   ___OT   ___SLP Medical Diagnosis:  Chronic bilateral low back pain without sciatica     PT Diagnosis:  chronic LBP with weak glute med, hypertonic QL and latissimus with underlying stenosis   Visits from SOC:  1      _________________________________________________________________________________  Plan of Treatment/Functional Goals:  ADL retraining, balance training, gait training, joint mobilization, manual therapy, motor coordination training, neuromuscular re-education, ROM, strengthening, stretching     Cryotherapy     Goals     Goal Description: Patient will be independent with her HEP to reduce future occurrence of pain and disability.  Target Date: 08/16/21       Goal Description: Patient will have >=4+/5 danni hip ABD strength to stabilize the knees and back with ADL's.  Target Date: 10/18/21       Goal Description: Patient will be able to sit for 30 min, then stand without LBP.  Target Date: 10/18/21                                                           Therapy Frequency:  other (see comments)  Predicted Duration of Therapy Intervention:  1x every 2-3 weeks 12 weeks total    Myrna Nascimento, PT                 I CERTIFY THE NEED FOR THESE SERVICES FURNISHED UNDER        THIS PLAN OF TREATMENT AND WHILE UNDER MY CARE     (Physician co-signature of this document indicates review and certification of the therapy plan).                       Certification Date From:  07/26/21   Certification Date To:  10/18/21    Referring Provider:  Dr. Xiao Pollard  Assessment        See Epic Evaluation Start of Care Date: 07/26/21

## 2021-08-03 ENCOUNTER — OFFICE VISIT (OUTPATIENT)
Dept: FAMILY MEDICINE | Facility: CLINIC | Age: 76
End: 2021-08-03
Payer: MEDICARE

## 2021-08-03 VITALS
BODY MASS INDEX: 31.34 KG/M2 | RESPIRATION RATE: 12 BRPM | WEIGHT: 195 LBS | DIASTOLIC BLOOD PRESSURE: 70 MMHG | SYSTOLIC BLOOD PRESSURE: 130 MMHG | HEART RATE: 70 BPM | HEIGHT: 66 IN

## 2021-08-03 DIAGNOSIS — M79.672 PAIN IN BOTH FEET: Primary | ICD-10-CM

## 2021-08-03 DIAGNOSIS — L30.9 DERMATITIS: ICD-10-CM

## 2021-08-03 DIAGNOSIS — M79.671 PAIN IN BOTH FEET: Primary | ICD-10-CM

## 2021-08-03 DIAGNOSIS — B37.2 YEAST INFECTION OF THE SKIN: ICD-10-CM

## 2021-08-03 DIAGNOSIS — Z13.6 CARDIOVASCULAR SCREENING; LDL GOAL LESS THAN 160: ICD-10-CM

## 2021-08-03 PROCEDURE — 99214 OFFICE O/P EST MOD 30 MIN: CPT | Performed by: FAMILY MEDICINE

## 2021-08-03 RX ORDER — NYSTATIN 100000 U/G
CREAM TOPICAL 2 TIMES DAILY
Qty: 30 G | Refills: 0 | Status: SHIPPED | OUTPATIENT
Start: 2021-08-03 | End: 2023-03-01

## 2021-08-03 RX ORDER — TRIAMCINOLONE ACETONIDE 1 MG/G
CREAM TOPICAL 2 TIMES DAILY
Qty: 45 G | Refills: 0 | Status: SHIPPED | OUTPATIENT
Start: 2021-08-03 | End: 2023-03-01

## 2021-08-03 ASSESSMENT — MIFFLIN-ST. JEOR: SCORE: 1395.23

## 2021-08-03 NOTE — PROGRESS NOTES
"    Assessment & Plan     Pain in both feet  Needs orthotics   - Orthopedic  Referral; Future    Dermatitis  Trial of the below for one week   - triamcinolone (KENALOG) 0.1 % external cream; Apply topically 2 times daily    Yeast infection of the skin  Recurrent  - nystatin (MYCOSTATIN) 466496 UNIT/GM external cream; Apply topically 2 times daily    CARDIOVASCULAR SCREENING; LDL GOAL LESS THAN 160    - Lipid panel reflex to direct LDL Fasting; Future  - Comprehensive metabolic panel; Future             BMI:   Estimated body mass index is 31.24 kg/m  as calculated from the following:    Height as of this encounter: 1.683 m (5' 6.25\").    Weight as of this encounter: 88.5 kg (195 lb).   Weight management plan: Discussed healthy diet and exercise guidelines    Patient Instructions   Set up podiatry appt for foot issue    Use the steroid cream on rash on the leg     Nystatin cream for itch in groin     Set up lab only test for diabetes and cholesterol       Return in about 1 week (around 8/10/2021) for Lab Work.    Xiao Dasilva MD  [facility]    Dhaval Vidal is a 76 year old who presents for the following health issues     HPI           Would like diabetes check, lipids      Musculoskeletal problem/pain  Onset/Duration: months   Description  Location: foot - bilateral  Joint Swelling: no  Redness: no  Pain: YES  Warmth: no  Intensity:  moderate  Progression of Symptoms:  worsening  Accompanying signs and symptoms:   Fevers: no  Numbness/tingling/weakness: no  History  Trauma to the area: no  Recent illness:  no  Previous similar problem: no  Previous evaluation:  no  Precipitating or alleviating factors:  Aggravating factors include: walking  Therapies tried and outcome: rest/inactivity    Rash  Onset/Duration: off and on for months  Description  Location: groin rash and leg rash   Character: flakey on the leg and weepy in the groin   Itching: moderate  Intensity:  " "moderate  Progression of Symptoms:  waxing and waning  Accompanying signs and symptoms:   Fever: no  Body aches or joint pain: no  Sore throat symptoms: no  Recent cold symptoms: no  History:           Previous episodes of similar rash: None  New exposures:  None  Recent travel: no  Exposure to similar rash: no  Precipitating or alleviating factors:   Therapies tried and outcome: none      Review of Systems   Constitutional, HEENT, cardiovascular, pulmonary, gi and gu systems are negative, except as otherwise noted.      Objective    /70   Pulse 70   Resp 12   Ht 1.683 m (5' 6.25\")   Wt 88.5 kg (195 lb)   BMI 31.24 kg/m    Body mass index is 31.24 kg/m .  Physical Exam   GENERAL APPEARANCE: healthy, alert and no distress  ORTHO: Foot Exam: Inspection:  no swelling ,   Tender::diffuse heel   Non-tender:  Range of Motion:flexion of toes:  full, extension of toes  full    SKIN: groin mild erythema confluent no draiange  LE flaey plaque excoriated   PSYCH: mentation appears normal and affect normal/bright                "

## 2021-08-03 NOTE — PATIENT INSTRUCTIONS
Set up podiatry appt for foot issue    Use the steroid cream on rash on the leg     Nystatin cream for itch in groin     Set up lab only test for diabetes and cholesterol

## 2021-08-03 NOTE — NURSING NOTE
"Chief Complaint   Patient presents with     Edema     /70   Pulse 70   Resp 12   Ht 1.683 m (5' 6.25\")   Wt 88.5 kg (195 lb)   BMI 31.24 kg/m   Estimated body mass index is 31.24 kg/m  as calculated from the following:    Height as of this encounter: 1.683 m (5' 6.25\").    Weight as of this encounter: 88.5 kg (195 lb).  Patient presents to the clinic using No DME      Health Maintenance that is potentially due pending provider review:    Health Maintenance Due   Topic Date Due     ANNUAL REVIEW OF HM ORDERS  Never done     ASTHMA CONTROL TEST  08/10/2020     MEDICARE ANNUAL WELLNESS VISIT  02/10/2021     FALL RISK ASSESSMENT  02/10/2021     ASTHMA ACTION PLAN  02/24/2021     ZOSTER IMMUNIZATION (3 of 3) 11/24/2020        Possibly completing today per provider review.        "

## 2021-08-04 ASSESSMENT — ASTHMA QUESTIONNAIRES: ACT_TOTALSCORE: 25

## 2021-08-10 ENCOUNTER — LAB (OUTPATIENT)
Dept: LAB | Facility: CLINIC | Age: 76
End: 2021-08-10
Payer: MEDICARE

## 2021-08-10 DIAGNOSIS — Z13.6 CARDIOVASCULAR SCREENING; LDL GOAL LESS THAN 160: ICD-10-CM

## 2021-08-10 LAB
ALBUMIN SERPL-MCNC: 3.8 G/DL (ref 3.4–5)
ALP SERPL-CCNC: 55 U/L (ref 40–150)
ALT SERPL W P-5'-P-CCNC: 32 U/L (ref 0–50)
ANION GAP SERPL CALCULATED.3IONS-SCNC: 5 MMOL/L (ref 3–14)
AST SERPL W P-5'-P-CCNC: 20 U/L (ref 0–45)
BILIRUB SERPL-MCNC: 0.6 MG/DL (ref 0.2–1.3)
BUN SERPL-MCNC: 11 MG/DL (ref 7–30)
CALCIUM SERPL-MCNC: 9.2 MG/DL (ref 8.5–10.1)
CHLORIDE BLD-SCNC: 104 MMOL/L (ref 94–109)
CHOLEST SERPL-MCNC: 187 MG/DL
CO2 SERPL-SCNC: 29 MMOL/L (ref 20–32)
CREAT SERPL-MCNC: 0.75 MG/DL (ref 0.52–1.04)
FASTING STATUS PATIENT QL REPORTED: YES
GFR SERPL CREATININE-BSD FRML MDRD: 78 ML/MIN/1.73M2
GLUCOSE BLD-MCNC: 86 MG/DL (ref 70–99)
HDLC SERPL-MCNC: 86 MG/DL
LDLC SERPL CALC-MCNC: 89 MG/DL
NONHDLC SERPL-MCNC: 101 MG/DL
POTASSIUM BLD-SCNC: 4 MMOL/L (ref 3.4–5.3)
PROT SERPL-MCNC: 7.1 G/DL (ref 6.8–8.8)
SODIUM SERPL-SCNC: 138 MMOL/L (ref 133–144)
TRIGL SERPL-MCNC: 61 MG/DL

## 2021-08-10 PROCEDURE — 80061 LIPID PANEL: CPT

## 2021-08-10 PROCEDURE — 36415 COLL VENOUS BLD VENIPUNCTURE: CPT

## 2021-08-10 PROCEDURE — 80053 COMPREHEN METABOLIC PANEL: CPT

## 2021-08-23 ENCOUNTER — ANCILLARY PROCEDURE (OUTPATIENT)
Dept: GENERAL RADIOLOGY | Facility: CLINIC | Age: 76
End: 2021-08-23
Attending: PODIATRIST
Payer: MEDICARE

## 2021-08-23 ENCOUNTER — OFFICE VISIT (OUTPATIENT)
Dept: PODIATRY | Facility: CLINIC | Age: 76
End: 2021-08-23
Payer: MEDICARE

## 2021-08-23 VITALS
HEIGHT: 66 IN | HEART RATE: 71 BPM | DIASTOLIC BLOOD PRESSURE: 68 MMHG | BODY MASS INDEX: 31.34 KG/M2 | SYSTOLIC BLOOD PRESSURE: 127 MMHG | WEIGHT: 195 LBS

## 2021-08-23 DIAGNOSIS — G89.29 CHRONIC FOOT PAIN, LEFT: Primary | ICD-10-CM

## 2021-08-23 DIAGNOSIS — M79.672 CHRONIC FOOT PAIN, LEFT: Primary | ICD-10-CM

## 2021-08-23 DIAGNOSIS — M20.22 HALLUX RIGIDUS, LEFT FOOT: ICD-10-CM

## 2021-08-23 PROCEDURE — 73630 X-RAY EXAM OF FOOT: CPT | Mod: LT | Performed by: RADIOLOGY

## 2021-08-23 PROCEDURE — 99203 OFFICE O/P NEW LOW 30 MIN: CPT | Performed by: PODIATRIST

## 2021-08-23 ASSESSMENT — MIFFLIN-ST. JEOR: SCORE: 1395.23

## 2021-08-23 NOTE — PROGRESS NOTES
PATIENT HISTORY:  Ruba Farmer is a 76 year old female who presents to clinic in consultation at the request of  Xiao Dasilva M.D. with a chief complaint of bunion left foot, great toe.  The patient is seen by themselves.  The patient relates the pain is primarily located around the great left toe joint.  Reports insidious onset without acute precipitating event. that has been going on for 8 month(s).  The patient has previously tried different shoes with little relief.  Denies any prior history of similar issues..  The patient is runs a nonprofit organization.  Any previous notes and studies that pertain to the patient's condition were reviewed.    Pertinent medical, surgical and family history was reviewed in Epic chart and include GERD, lumbar strain    Medications:   Current Outpatient Medications:      albuterol (PROAIR HFA/PROVENTIL HFA/VENTOLIN HFA) 108 (90 Base) MCG/ACT inhaler, Inhale 2 puffs into the lungs every 6 hours as needed for shortness of breath / dyspnea, Disp: 1 Inhaler, Rfl: 1     Ascorbic Acid (VITAMIN C) 500 MG CAPS, Take 500 mg by mouth daily , Disp: , Rfl:      Cholecalciferol (VITAMIN D) 2000 UNITS CAPS, Take 2,000 Units by mouth daily , Disp: , Rfl:      Coenzyme Q10 (COQ10 PO), Take 1 capsule by mouth daily, Disp: , Rfl:      dextromethorphan-guaiFENesin (MUCINEX DM)  MG per 12 hr tablet, Take 1 tablet by mouth every 12 hours, Disp: , Rfl:      Digestive Enzyme CAPS, , Disp: , Rfl:      Fexofenadine HCl (ALLEGRA ALLERGY PO), Take by mouth daily, Disp: , Rfl:      fluticasone (FLONASE) 50 MCG/ACT spray, Spray 1-2 sprays into both nostrils daily, Disp: 16 g, Rfl: 0     ibuprofen (ADVIL/MOTRIN) 200 MG tablet, 1-3 tablets as needed with food, Disp: 120 tablet, Rfl: 0     ipratropium (ATROVENT) 0.06 % spray, Spray 2 sprays into both nostrils 3 times daily as needed for rhinitis, Disp: 3 Box, Rfl: 5     LORazepam (ATIVAN) 0.5 MG tablet, Take 1 tablet (0.5 mg) by mouth every 8  "hours as needed for anxiety, Disp: 20 tablet, Rfl: 0     Magnesium Oxide 250 MG TABS, , Disp: , Rfl:      nystatin (MYCOSTATIN) 657343 UNIT/GM external cream, Apply topically 2 times daily, Disp: 30 g, Rfl: 0     nystatin (MYCOSTATIN) 043752 UNIT/GM external cream, Apply topically 2 times daily Apply to affected areas, Disp: 30 g, Rfl: 0     OVER-THE-COUNTER, D-monos takes after SIC to prevent UTIs, Disp: , Rfl:      POTASSIUM CITRATE PO, Take 1 tablet by mouth daily, Disp: , Rfl:      Probiotic Product (PROBIOTIC DAILY PO), Take 1 tablet by mouth daily, Disp: , Rfl:      pseudoePHEDrine (SUDAFED) 30 MG tablet, Take 30 mg by mouth every 4 hours as needed for congestion, Disp: , Rfl:      triamcinolone (KENALOG) 0.1 % external cream, Apply topically 2 times daily, Disp: 45 g, Rfl: 0     azithromycin (ZITHROMAX) 250 MG tablet, TAKE 2 TABLETS BY MOUTH ON THE FIRST DAY, THEN TAKE ONE TABLET BY MOUTH ONCE DAILY FOR 4 DAYS (Patient not taking: Reported on 8/3/2021), Disp: 6 tablet, Rfl: 10     benzonatate (TESSALON) 100 MG capsule, Take 1 capsule (100 mg) by mouth 3 times daily as needed for cough (Patient not taking: Reported on 6/5/2020), Disp: 42 capsule, Rfl: 0     sulfamethoxazole-trimethoprim (BACTRIM DS) 800-160 MG tablet, 1 tab after intercourse (Patient not taking: Reported on 8/3/2021), Disp: 30 tablet, Rfl: 1     Allergies:    Allergies   Allergen Reactions     Macrobid [Nitrofuran Derivatives] Shortness Of Breath and Other (See Comments)     High fever     Codeine Other (See Comments) and Nausea and Vomiting     headache       Vitals: /68   Pulse 71   Ht 1.683 m (5' 6.25\")   Wt 88.5 kg (195 lb)   BMI 31.24 kg/m    BMI= Body mass index is 31.24 kg/m .    LOWER EXTREMITY PHYSICAL EXAM    Dermatologic: Skin is intact to left lower extremity without significant lesions, rash or abrasion.        Vascular: DP & PT pulses are intact & regular on the left.   CFT and skin temperature is normal to the left " lower extremity.     Neurologic: Lower extremity sensation is intact to light touch.  No evidence of weakness in the left lower extremity.        Musculoskeletal: Patient is ambulatory without assistive device or brace.  No gross ankle deformity noted.  No foot or ankle joint effusion is noted.  Noted pain with limited range of motion of the left first metatarsophalangeal joint.  Noted pain with palpation over the dorsal aspect of the first metatarsophalangeal joint on the left.      Diagnostics:  Radiographs included three views of the left foot demonstrating severe degenerative changes to the first metatarsophalangeal joint with dorsal spurring noted.  No cortical erosions or periosteal elevation.  All joint margins appear stable.  There is no apparent fracture or tumor formation noted.  There is no evidence of foreign body.  The images were independently reviewed by myself along with the patient explaining the findings.      ASSESSMENT / PLAN:     ICD-10-CM    1. Chronic foot pain, left  M79.672 XR Foot Left G/E 3 Views    G89.29    2. Pain in both feet  M79.671 XR Foot Left G/E 3 Views    M79.672 Orthopedic  Referral       I have explained to Ruba about the conditions.  We discussed the underlying contributing factors to the condition as well as treatment options along with expected length of recovery.  At this time, the patient was educated on the importance of offloading supportive shoes and other devices.  I demonstrated to the patient calf stretches to perform every hour daily until symptoms resolve.  After symptoms resolve, the patient was advised to perform the stretches 3 times daily to prevent future recurrence.  The patient was instructed to perform warm soaks with Epson salt after which to also apply over-the-counter Voltaren gel to deeply massage the injured tissue.  The patient was instructed to do this on a daily basis until symptoms resolve.  The patient may also take over-the-counter  NSAID medication, if tolerated, to help further reduce the inflammation tissue.   The patient was advised to take this type of medication with food to prevent stomach irritation and to stop taking the medication if stomach irritation occurs.    The patient will return in four weeks for reevaluation if the symptoms do not resolve.        Ruba verbalized agreement with and understanding of the rational for the diagnosis and treatment plan.  All questions were answered to best of my ability and the patient's satisfaction. The patient was advised to contact the clinic with any questions that may arise after the clinic visit.      Disclaimer: This note consists of symbols derived from keyboarding, dictation and/or voice recognition software. As a result, there may be errors in the script that have gone undetected. Please consider this when interpreting information found in this chart.       DEANN Gurrola D.P.M., F.A.C.F.A.S.

## 2021-08-23 NOTE — NURSING NOTE
"Chief Complaint   Patient presents with     Consult     left foot bunion       Initial /68   Pulse 71   Ht 1.683 m (5' 6.25\")   Wt 88.5 kg (195 lb)   BMI 31.24 kg/m   Estimated body mass index is 31.24 kg/m  as calculated from the following:    Height as of this encounter: 1.683 m (5' 6.25\").    Weight as of this encounter: 88.5 kg (195 lb).  Medications and allergies reviewed.      Shelley STARK MA    "

## 2021-08-23 NOTE — PATIENT INSTRUCTIONS
OSTEOARTHRITIS OF THE FOOT & ANKLE  Osteoarthritis is a condition characterized by the breakdown and eventual loss of cartilage in one or more joints. Cartilage (the connective tissue found at the end of the bones in the joints) protects and cushions the bones during movement. When cartilage deteriorates or is lost, symptoms develop that can restrict one s ability to easily perform daily activities.  Osteoarthritis is also known as degenerative arthritis, reflecting its nature to develop as part of the aging process. As the most common form of arthritis, osteoarthritis affects millions of Americans. Some people refer to osteoarthritis simply as arthritis, even though there are many different types of arthritis.  Osteoarthritis appears at various joints throughout the body, including the hands, feet, spine, hips, and knees. In the foot, the disease most frequently occurs in the big toe, although it is also often found in the midfoot and ankle.  CAUSES  Osteoarthritis is considered a  wear and tear  disease because the cartilage in the joint wears down with repeated stress and use over time. As the cartilage deteriorates and gets thinner, the bones lose their protective covering and eventually may rub together, causing pain and inflammation of the joint.  An injury may also lead to osteoarthritis, although it may take months or years after the injury for the condition to develop. For example, osteoarthritis in the big toe is often caused by kicking or jamming the toe, or by dropping something on the toe. Osteoarthritis in the midfoot is often caused by dropping something on it, or by a sprain or fracture. In the ankle, osteoarthritis is usually caused by a fracture and occasionally by a severe sprain.  Sometimes osteoarthritis develops as a result of abnormal foot mechanics such as flat feet or high arches. A flat foot causes less stability in the ligaments (bands of tissue that connect bones), resulting in excessive  strain on the joints, which can cause arthritis. A high arch is rigid and lacks mobility, causing a jamming of joints that creates an increased risk of arthritis.  SYMPTOMS  People with osteoarthritis in the foot or ankle experience, in varying degrees, one or more of the following: Pain and stiffness in the joint, swelling in or near the joint, or difficulty walking or bending the joint.   Some patients with osteoarthritis also develop a bone spur (a bony protrusion) at the affected joint. Shoe pressure may cause pain at the site of a bone spur, and in some cases blisters or calluses may form over its surface. Bone spurs can also limit the movement of the joint.    DIAGNOSIS  In diagnosing osteoarthritis, the foot and ankle surgeon will examine the foot thoroughly, looking for swelling in the joint, limited mobility, and pain with movement. In some cases, deformity and/or enlargement (spur) of the joint may be noted. X-rays may be ordered to evaluate the extent of the disease.  NON-SURGICAL TREATMENT  To help relieve symptoms, the surgeon may begin treating osteoarthritis with one or more of the following non-surgical approaches:  Oral medications. Nonsteroidal anti-inflammatory drugs (NSAIDs), such as ibuprofen, are often helpful in reducing the inflammation and pain. Occasionally a prescription for a steroid medication is needed to adequately reduce symptoms.   Orthotic devices. Custom orthotic devices (shoe inserts) are often prescribed to provide support to improve the foot s mechanics or cushioning to help minimize pain.   Bracing. Bracing, which restricts motion and supports the joint, can reduce pain during walking and help prevent further deformity.   Immobilization. Protecting the foot from movement by wearing a cast or removable cast-boot may be necessary to allow the inflammation to resolve.   Steroid injections. In some cases, steroid injections are applied to the affected joint to deliver  anti-inflammatory medication.   Physical therapy. Exercises to strengthen the muscles, especially when the osteoarthritis occurs in the ankle, may give the patient greater stability and help avoid injury that might worsen the condition.   DO I NEED SURGERY?  When osteoarthritis has progressed substantially or failed to improve with non-surgical treatment, surgery may be recommended. In advanced cases, surgery may be the only option. The goal of surgery is to decrease pain and improve function. The foot and ankle surgeon will consider a number of factors when selecting the procedure best suited to the patient s condition and lifestyle.      Next Steps:      1. Support:  a. Wear supportive shoes, sandals, boots and/or inserts that have a rigid supportive sole.    i. This will offload the majority of tension forces that travel through your feet every step you take.    1. Fluther Max Cushioning Elite/Premier   2. Fluther Relax Fit D'Lux Walker  3. Superfeet inserts ([email or web address].com)  b. It is important that you also wear supportive shoe wear in the house to continue providing support to your feet.    c. You may always use a cushioned liner for your shoes if that makes your feet feel better.  2. Stretching  a. Calf stretching is essential to offload the tension forces that travel through your feet every step you take  b. Preferred calf stretch is the Runner's Stretch  i. Place one foot behind the other foot, flat against the ground (it is important to keep the heel on the ground).  The back leg is the one that will be stretched.  1. Start with the knee straight and lean your hips into the wall, counter or whatever you are leaning into - count to ten.  2. Next, bend the knee.  You should feel the stretch lower in the calf muscle - count to ten.  c. Repeat this stretch once an hour to start off with.  When symptoms subside, I recommend performing the stretch 3 times daily to prevent any future problems.                 3. Tissue Massage  a. It is important that you physically loosen the inflammation tissue to help your body heal the injured tissue.  b. I recommend soaking your foot in warm water to increase the microcirculation to the soft tissues.  You may add Epson salt to the water if you prefer.  c. You may apply an over-the-counter muscle rub, such as Voltaren gel, and deeply massage the injured tissue.  4. Reduce Inflammation  a. You can ice the injured tissue with an ice pack with a light cloth covering or soaking in ice water 20 minutes to reduce any acute inflammation, typically at the end of the day.  b. If tolerated, you may take a Non-Steroidal Antiinflammatory medication (NSAID), such as Advil or Aleve, to help reduce the inflammation tissue.  This can help the overall healing of the injured tissue.  i. It is important to take food with any NSAID medication as the most common side effect is stomach irritation.  If you encounter any problems when taking NSAID, it is recommended that you stop taking the medication and notify your provider.    It is important to understand that most problems that develop in the foot and ankle are caused by excessive tension that cause microinjury to the soft tissues and inflammation in the foot and ankle.  By addressing the underlying causes with support and stretching as well as treating the current inflammatory conditions with tissue massage and anti-inflammatory treatments, most foot and ankle musculoskeletal conditions will resolve.  This may take time to heal.  However, if symptoms persist past 4 weeks you should return to the office for reevaluation to determine further treatment options.

## 2021-08-23 NOTE — LETTER
8/23/2021         RE: Ruba Farmer  [address] [facility] [id number]        Dear Colleague,    Thank you for referring your patient, Ruba Farmer, to the [facility] [place]. Please see a copy of my visit note below.    PATIENT HISTORY:  Ruba Farmer is a 76 year old female who presents to clinic in consultation at the request of  Xiao Dasilva M.D. with a chief complaint of bunion left foot, great toe.  The patient is seen by themselves.  The patient relates the pain is primarily located around the great left toe joint.  Reports insidious onset without acute precipitating event. that has been going on for 8 month(s).  The patient has previously tried different shoes with little relief.  Denies any prior history of similar issues..  The patient is runs a nonprofit organization.  Any previous notes and studies that pertain to the patient's condition were reviewed.    Pertinent medical, surgical and family history was reviewed in Epic chart and include GERD, lumbar strain    Medications:   Current Outpatient Medications:      albuterol (PROAIR HFA/PROVENTIL HFA/VENTOLIN HFA) 108 (90 Base) MCG/ACT inhaler, Inhale 2 puffs into the lungs every 6 hours as needed for shortness of breath / dyspnea, Disp: 1 Inhaler, Rfl: 1     Ascorbic Acid (VITAMIN C) 500 MG CAPS, Take 500 mg by mouth daily , Disp: , Rfl:      Cholecalciferol (VITAMIN D) 2000 UNITS CAPS, Take 2,000 Units by mouth daily , Disp: , Rfl:      Coenzyme Q10 (COQ10 PO), Take 1 capsule by mouth daily, Disp: , Rfl:      dextromethorphan-guaiFENesin (MUCINEX DM)  MG per 12 hr tablet, Take 1 tablet by mouth every 12 hours, Disp: , Rfl:      Digestive Enzyme CAPS, , Disp: , Rfl:      Fexofenadine HCl (ALLEGRA ALLERGY PO), Take by mouth daily, Disp: , Rfl:      fluticasone (FLONASE) 50 MCG/ACT spray, Spray 1-2 sprays into both nostrils daily, Disp: 16 g, Rfl: 0     ibuprofen (ADVIL/MOTRIN) 200 MG tablet, 1-3  "tablets as needed with food, Disp: 120 tablet, Rfl: 0     ipratropium (ATROVENT) 0.06 % spray, Spray 2 sprays into both nostrils 3 times daily as needed for rhinitis, Disp: 3 Box, Rfl: 5     LORazepam (ATIVAN) 0.5 MG tablet, Take 1 tablet (0.5 mg) by mouth every 8 hours as needed for anxiety, Disp: 20 tablet, Rfl: 0     Magnesium Oxide 250 MG TABS, , Disp: , Rfl:      nystatin (MYCOSTATIN) 857543 UNIT/GM external cream, Apply topically 2 times daily, Disp: 30 g, Rfl: 0     nystatin (MYCOSTATIN) 488181 UNIT/GM external cream, Apply topically 2 times daily Apply to affected areas, Disp: 30 g, Rfl: 0     OVER-THE-COUNTER, D-monos takes after SIC to prevent UTIs, Disp: , Rfl:      POTASSIUM CITRATE PO, Take 1 tablet by mouth daily, Disp: , Rfl:      Probiotic Product (PROBIOTIC DAILY PO), Take 1 tablet by mouth daily, Disp: , Rfl:      pseudoePHEDrine (SUDAFED) 30 MG tablet, Take 30 mg by mouth every 4 hours as needed for congestion, Disp: , Rfl:      triamcinolone (KENALOG) 0.1 % external cream, Apply topically 2 times daily, Disp: 45 g, Rfl: 0     azithromycin (ZITHROMAX) 250 MG tablet, TAKE 2 TABLETS BY MOUTH ON THE FIRST DAY, THEN TAKE ONE TABLET BY MOUTH ONCE DAILY FOR 4 DAYS (Patient not taking: Reported on 8/3/2021), Disp: 6 tablet, Rfl: 10     benzonatate (TESSALON) 100 MG capsule, Take 1 capsule (100 mg) by mouth 3 times daily as needed for cough (Patient not taking: Reported on 6/5/2020), Disp: 42 capsule, Rfl: 0     sulfamethoxazole-trimethoprim (BACTRIM DS) 800-160 MG tablet, 1 tab after intercourse (Patient not taking: Reported on 8/3/2021), Disp: 30 tablet, Rfl: 1     Allergies:    Allergies   Allergen Reactions     Macrobid [Nitrofuran Derivatives] Shortness Of Breath and Other (See Comments)     High fever     Codeine Other (See Comments) and Nausea and Vomiting     headache       Vitals: /68   Pulse 71   Ht 1.683 m (5' 6.25\")   Wt 88.5 kg (195 lb)   BMI 31.24 kg/m    BMI= Body mass index is " 31.24 kg/m .    LOWER EXTREMITY PHYSICAL EXAM    Dermatologic: Skin is intact to left lower extremity without significant lesions, rash or abrasion.        Vascular: DP & PT pulses are intact & regular on the left.   CFT and skin temperature is normal to the left lower extremity.     Neurologic: Lower extremity sensation is intact to light touch.  No evidence of weakness in the left lower extremity.        Musculoskeletal: Patient is ambulatory without assistive device or brace.  No gross ankle deformity noted.  No foot or ankle joint effusion is noted.  Noted pain with limited range of motion of the left first metatarsophalangeal joint.  Noted pain with palpation over the dorsal aspect of the first metatarsophalangeal joint on the left.      Diagnostics:  Radiographs included three views of the left foot demonstrating severe degenerative changes to the first metatarsophalangeal joint with dorsal spurring noted.  No cortical erosions or periosteal elevation.  All joint margins appear stable.  There is no apparent fracture or tumor formation noted.  There is no evidence of foreign body.  The images were independently reviewed by myself along with the patient explaining the findings.      ASSESSMENT / PLAN:     ICD-10-CM    1. Chronic foot pain, left  M79.672 XR Foot Left G/E 3 Views    G89.29    2. Pain in both feet  M79.671 XR Foot Left G/E 3 Views    M79.672 Orthopedic  Referral       I have explained to Ruba about the conditions.  We discussed the underlying contributing factors to the condition as well as treatment options along with expected length of recovery.  At this time, the patient was educated on the importance of offloading supportive shoes and other devices.  I demonstrated to the patient calf stretches to perform every hour daily until symptoms resolve.  After symptoms resolve, the patient was advised to perform the stretches 3 times daily to prevent future recurrence.  The patient was  instructed to perform warm soaks with Epson salt after which to also apply over-the-counter Voltaren gel to deeply massage the injured tissue.  The patient was instructed to do this on a daily basis until symptoms resolve.  The patient may also take over-the-counter NSAID medication, if tolerated, to help further reduce the inflammation tissue.   The patient was advised to take this type of medication with food to prevent stomach irritation and to stop taking the medication if stomach irritation occurs.    The patient will return in four weeks for reevaluation if the symptoms do not resolve.        Ruba verbalized agreement with and understanding of the rational for the diagnosis and treatment plan.  All questions were answered to best of my ability and the patient's satisfaction. The patient was advised to contact the clinic with any questions that may arise after the clinic visit.      Disclaimer: This note consists of symbols derived from keyboarding, dictation and/or voice recognition software. As a result, there may be errors in the script that have gone undetected. Please consider this when interpreting information found in this chart.       MI Haque.[email or web address]., F.A.C.F.A.S.        Again, thank you for allowing me to participate in the care of your patient.        Sincerely,        Albino Gurrola DPM

## 2021-09-25 ENCOUNTER — HEALTH MAINTENANCE LETTER (OUTPATIENT)
Age: 76
End: 2021-09-25

## 2021-09-29 ENCOUNTER — TELEPHONE (OUTPATIENT)
Dept: FAMILY MEDICINE | Facility: CLINIC | Age: 76
End: 2021-09-29

## 2021-09-29 NOTE — TELEPHONE ENCOUNTER
Reason for Call:  Same Day Appointment, Requested Provider:  Xiao Dasilva    PCP: Xiao Dasilva    Reason for visit: knee pain    Duration of symptoms: 2-3 wks    Have you been treated for this in the past? No    Additional comments:     Can we leave a detailed message on this number? YES    Phone number patient can be reached at: Cell number on file:    Telephone Information:   Mobile [phone]       Best Time:     Call taken on 9/29/2021 at 2:30 PM by Martha Acosta

## 2021-09-29 NOTE — TELEPHONE ENCOUNTER
Patient was called, she says she has had bilateral knee pain going on for 2-3 weeks, says she walks 10,000 steps again and thinks this could be why, says the inner side of knees is painful, says pain occasionally shoots up to her right upper leg, does not feel warm, patient denies chest pain, no redness, not short of breath, and no calf pain. Patient thinks it is the bursa that could be effected as there is minimal swelling.    Patient is advised to be seen, patient prefers MD and PCP has no availability, patient is scheduled with Dr. Palomares tomorrow, patient is told if pain worsens or develops chest pain, shortness of breath, calf pain then seek the ER.    Patient is agreeable to this plan.      JEANINE Sandy

## 2021-09-30 ENCOUNTER — OFFICE VISIT (OUTPATIENT)
Dept: FAMILY MEDICINE | Facility: CLINIC | Age: 76
End: 2021-09-30
Payer: MEDICARE

## 2021-09-30 ENCOUNTER — ANCILLARY PROCEDURE (OUTPATIENT)
Dept: GENERAL RADIOLOGY | Facility: CLINIC | Age: 76
End: 2021-09-30
Attending: INTERNAL MEDICINE
Payer: MEDICARE

## 2021-09-30 VITALS
BODY MASS INDEX: 31.97 KG/M2 | TEMPERATURE: 98.6 F | DIASTOLIC BLOOD PRESSURE: 78 MMHG | OXYGEN SATURATION: 96 % | RESPIRATION RATE: 12 BRPM | SYSTOLIC BLOOD PRESSURE: 130 MMHG | HEART RATE: 70 BPM | WEIGHT: 199.6 LBS

## 2021-09-30 DIAGNOSIS — M25.562 CHRONIC PAIN OF BOTH KNEES: Primary | ICD-10-CM

## 2021-09-30 DIAGNOSIS — M25.561 CHRONIC PAIN OF BOTH KNEES: Primary | ICD-10-CM

## 2021-09-30 DIAGNOSIS — G89.29 CHRONIC PAIN OF BOTH KNEES: ICD-10-CM

## 2021-09-30 DIAGNOSIS — M25.562 CHRONIC PAIN OF BOTH KNEES: ICD-10-CM

## 2021-09-30 DIAGNOSIS — G89.29 CHRONIC PAIN OF BOTH KNEES: Primary | ICD-10-CM

## 2021-09-30 DIAGNOSIS — M25.561 CHRONIC PAIN OF BOTH KNEES: ICD-10-CM

## 2021-09-30 PROCEDURE — 73560 X-RAY EXAM OF KNEE 1 OR 2: CPT | Mod: 59 | Performed by: RADIOLOGY

## 2021-09-30 PROCEDURE — 99213 OFFICE O/P EST LOW 20 MIN: CPT | Performed by: INTERNAL MEDICINE

## 2021-09-30 NOTE — PROGRESS NOTES
Assessment & Plan     Chronic pain of both knees    Marcy presents with one month of bilateral medial knee pain with mild swelling but no redness.  Exam shows medial joint line pain.  No pain with valgus/varus stress test to suggest ligament issue or pain with rotation of the lower leg to suggest meniscus issue.  X-ray shows mild narrowing of the medial compartments, so pain likely due to OA.  Treatments discussed as below and she'd like to continue OTCs and try PT.  Call if not improving after a couple of months of PT and would consider sport med referral.     - XR Knee AP/Lat Standing Bilateral; Future  - Physical Therapy Referral; Future         Patient Instructions   Treatments for arthritis:  1. Over the counter pain medications   A. Tylenol (Acetaminophen) 500-1000 mg 3 x day as needed   B. Ibuprofen (or other NSAIDs such as Aleve, Aspirin, Advil) 400-600 mg 3 x day as needed - can alternate with Tylenol (max 3000mg per day)   C. Supplement such as Glucosamine with Chondroitin   2. Over the counter topical   A. Aspercream with Lidocaine, Capsaicin, Icy Hot, Biofreeze, Salon Pas, Blue Emu   B. Voltaren gel (topical anti-inflammatory)  3. Prescription pain medications (NSAIDS)   A. Celebrex 100-200 mg 2 x day as needed.  Similar to Ibuprofen, cannot take along with Celebrex   B. Prescription Ibuprofen  4. Physical therapy   5. Heat, ice, stretching, braces, modified activity  6. Sports Medicine or Orthopedics for Injections (steroids, 'rooster comb')  7. Joint replacement            No follow-ups on file.    Guevara Palomares MD  [facility]    Subjective   Marcy is a 76 year old who presents for the following health issues     HPI       Bilateral Knee Pain    Symptoms for about one month, states no injury/incident.  Does a lot of walking.     States bilateral medial knee pain and swelling, right knee is more painful. Will radiate up thigh at times.  Having weakness in the thighs ongoing for  awhile.      Tender to touch.  Little bit of swelling.  No redness, numbness or tingling.     States some instability, weakness and buckling.     Increased pain with walking for long periods, going up/down stairs.     Has tried IBU. States helps temporarily - she is taking 4 tablets twice per day.  She just increased her glucosamine-chondroitin this past week and that has helped elsewhere in the body but not in the knees yet    Rates pain at  7/10 while at rest, 7/10 at worst.    No past history of any issues with her knees.    Recently seen for foot pain and bunions.  Gets some leg tightness at night, straightening the legs and flexing the feet resolves the symptoms.      Review of Systems   Constitutional, MSK systems are negative, except as otherwise noted.      Objective    /78   Pulse 70   Temp 98.6  F (37  C) (Tympanic)   Resp 12   Wt 90.5 kg (199 lb 9.6 oz)   SpO2 96%   BMI 31.97 kg/m    Body mass index is 31.97 kg/m .  Physical Exam     GENERAL: healthy, alert and no distress  MS: bilateral knees: pain to palpation medially without swelling and no redness noted, no  joint laxity, no  pain with varus or valgus stress test.  No pain with rotation of the lower leg      No results found for this or any previous visit (from the past 24 hour(s)).

## 2021-09-30 NOTE — NURSING NOTE
"Initial /78   Pulse 70   Temp 98.6  F (37  C) (Tympanic)   Resp 12   Wt 90.5 kg (199 lb 9.6 oz)   SpO2 96%   BMI 31.97 kg/m   Estimated body mass index is 31.97 kg/m  as calculated from the following:    Height as of 8/23/21: 1.683 m (5' 6.25\").    Weight as of this encounter: 90.5 kg (199 lb 9.6 oz). .      "

## 2021-09-30 NOTE — PATIENT INSTRUCTIONS
Treatments for arthritis:  1. Over the counter pain medications   A. Tylenol (Acetaminophen) 500-1000 mg 3 x day as needed   B. Ibuprofen (or other NSAIDs such as Aleve, Aspirin, Advil) 400-600 mg 3 x day as needed - can alternate with Tylenol (max 3000mg per day)   C. Supplement such as Glucosamine with Chondroitin   2. Over the counter topical   A. Aspercream with Lidocaine, Capsaicin, Icy Hot, Biofreeze, Salon Pas, Blue Emu   B. Voltaren gel (topical anti-inflammatory)  3. Prescription pain medications (NSAIDS)   A. Celebrex 100-200 mg 2 x day as needed.  Similar to Ibuprofen, cannot take along with Celebrex   B. Prescription Ibuprofen  4. Physical therapy   5. Heat, ice, stretching, braces, modified activity  6. Sports Medicine or Orthopedics for Injections (steroids, 'rooster comb')  7. Joint replacement

## 2021-10-05 ENCOUNTER — HOSPITAL ENCOUNTER (OUTPATIENT)
Dept: PHYSICAL THERAPY | Facility: CLINIC | Age: 76
Setting detail: THERAPIES SERIES
End: 2021-10-05
Attending: FAMILY MEDICINE
Payer: MEDICARE

## 2021-10-05 DIAGNOSIS — G89.29 CHRONIC PAIN OF BOTH KNEES: ICD-10-CM

## 2021-10-05 DIAGNOSIS — M25.562 CHRONIC PAIN OF BOTH KNEES: ICD-10-CM

## 2021-10-05 DIAGNOSIS — M25.561 CHRONIC PAIN OF BOTH KNEES: ICD-10-CM

## 2021-10-05 PROCEDURE — 97110 THERAPEUTIC EXERCISES: CPT | Mod: GP | Performed by: PHYSICAL MEDICINE & REHABILITATION

## 2021-10-05 PROCEDURE — 97161 PT EVAL LOW COMPLEX 20 MIN: CPT | Mod: GP | Performed by: PHYSICAL MEDICINE & REHABILITATION

## 2021-10-05 NOTE — PROGRESS NOTES
"[facility]          OUTPATIENT PHYSICAL THERAPY ORTHOPEDIC EVALUATION  PLAN OF TREATMENT FOR OUTPATIENT REHABILITATION  (COMPLETE FOR INITIAL CLAIMS ONLY)  Patient's Last Name, First Name, M.I.  YOB: 1945  Ruba Farmer    Provider s Name:  [facility]   Medical Record No.  [id number]   Start of Care Date:  10/05/21   Onset Date:  09/05/21 (\"started a month ago\")   Type:     _X__PT   ___OT   ___SLP Medical Diagnosis:  chronic pain of both knees     PT Diagnosis:  B knee pain   Visits from SOC:  1      _________________________________________________________________________________  Plan of Treatment/Functional Goals:  ADL retraining, balance training, bed mobility training, gait training, joint mobilization, manual therapy, motor coordination training, neuromuscular re-education, ROM, strengthening, stretching, transfer training     Cryotherapy, Electrical stimulation, Hot packs, Traction, Ultrasound  only as needed  Goals  Goal Identifier: 1  Goal Description: Pt will be able to amb 5,000 steps with less than 3/10 pain.  Target Date: 11/02/21    Goal Identifier: 2  Goal Description: Pt will demonstrate 5/5 B LE strength in order to decrease difficulty with amb.  Target Date: 11/16/21    Goal Identifier: 3  Goal Description: Pt will be independent with HEP in order to self manage symptoms.   Target Date: 12/03/21    Goal Identifier: 4  Goal Description: Pt will be able to amb 10,000 steps with less than 3/10 pain  Target Date: 12/03/21         Therapy Frequency:  1 time/week  Predicted Duration of Therapy Intervention:  8 weeks    Jeannie Billingsley PT                 I CERTIFY THE NEED FOR THESE SERVICES FURNISHED UNDER        THIS PLAN OF TREATMENT AND WHILE UNDER MY CARE     (Physician co-signature of this document indicates review and certification of the therapy plan).                       Certification Date From:  10/05/21 "   Certification Date To:  12/03/21    Referring Provider:  Guevara Palomares MD    Initial Assessment        See Epic Evaluation Start of Care Date: 10/05/21

## 2021-10-05 NOTE — PROGRESS NOTES
"   10/05/21 1400   General Information   Type of Visit Initial OP Ortho PT Evaluation   Start of Care Date 10/05/21   Referring Physician Guevara Palomares MD   Patient/Family Goals Statement improve gait/amb   Orders Evaluate and Treat   Date of Order 09/30/21   Certification Required? Yes  ([facility])   Medical Diagnosis chronic pain of both knees   Surgical/Medical history reviewed Yes   Precautions/Limitations no known precautions/limitations   General Information Comments PMHx per pt report: arthritis   Body Part(s)   Body Part(s) Knee   Presentation and Etiology   Pertinent history of current problem (include personal factors and/or comorbidities that impact the POC) Pt arrived to PT today for B knee pain that started a month ago. Notes pain started from insidious onset, but does note arthritis. States she really enjoys walking, usually 10,000 steps/day, but has been challenging due to pain.   Impairments A. Pain;B. Decreased WB tolerance;C. Swelling;E. Decreased flexibility   Functional Limitations perform activities of daily living;perform desired leisure / sports activities   Symptom Location B knees   How/Where did it occur From insidious onset   Onset date of current episode/exacerbation 09/05/21  (\"started a month ago\")   Chronicity New   Pain rating (0-10 point scale) Best (/10);Worst (/10)   Best (/10) 2   Worst (/10) 7   Pain quality A. Sharp;C. Aching   Frequency of pain/symptoms A. Constant   Pain/symptoms are: The same all the time   Pain/symptoms exacerbated by M. Other   Pain exacerbation comment unable to qualify   Pain/symptoms eased by K. Other   Pain eased by comment ibuprofen   Progression of symptoms since onset: Worsened   Prior Level of Function   Prior Level of Function-Mobility independent   Prior Level of Function-ADLs independent   Current Level of Function   Current Community Support Family/friend caregiver   Patient role/employment history A. Employed   Employment Comments work at home "   Living environment House[facility]   Current equipment-Gait/Locomotion None   Fall Risk Screen   Fall screen completed by PT   Have you fallen 2 or more times in the past year? No   Have you fallen and had an injury in the past year? No   Is patient a fall risk? No   Abuse Screen (yes response referral indicated)   Feels Unsafe at Home or Work/School no   Feels Threatened by Someone no   Physical Signs of Abuse Present no   Functional Scales   Functional Scales Other   Other Scales  LEFS: 35/80   Knee Objective Findings   Side (if bilateral, select both right and left) Right;Left   Integumentary  edema noted in R knee   Posture unremarkable   Gait/Locomotion decreased stance time on R, decreased stride length   Knee/Hip Strength Comments Hip flexion: 4/5 B, Hip IR and ER: 4/5 B, seated ADD: 5/5 B   Lachmans Test neg B   Anterior Drawer Test neg B   Posterior Drawer Test neg B   Varus Stress Test neg B   Valgus Stress Test neg B   Cathy's Test neg B   Palpation pain along medial structures of R knee   Accessory Motion/Joint Mobility hypomobility of patellofemoral and patellofemoral jts   Right Knee Extension AROM lacking 8*   Right Knee Extension PROM lacking 5*   Right Knee Flexion AROM 122*   Right Knee Flexion PROM 125   Right Knee Flexion Strength 5/5   Right Knee Extension Strength 5/5   Right Hip Abduction Strength seated: 5/5   Right Quad Set Strength good   R VMO Strength good   Right Gastrocnemius Flexibility wnl   Right Hamstring Flexibility limited   Right Hip Flexor Flexibility limited   Right Quadricep Flexibility wnl   Left Knee Extension AROM lacking 2*   Left Knee Extension PROM 0*   Left Knee Flexion AROM 129*   Left Knee Flexion PROM 129*   Left Knee Flexion Strength 5/5   Left Knee Extension Strength 5/5   Left Hip Abduction Strength seated: 5/5   Left Quad Set Strength good   L VMO Strength good   Left Gastrocnemius Flexibility wnl   Left Hamstring Flexibility wnl   Left Hip Flexor Flexibility  limited   Left Quadricep Flexibility wnl   Planned Therapy Interventions   Planned Therapy Interventions ADL retraining;balance training;bed mobility training;gait training;joint mobilization;manual therapy;motor coordination training;neuromuscular re-education;ROM;strengthening;stretching;transfer training   Planned Modality Interventions   Planned Modality Interventions Cryotherapy;Electrical stimulation;Hot packs;Traction;Ultrasound   Planned Modality Interventions Comments only as needed   Clinical Impression   Criteria for Skilled Therapeutic Interventions Met yes, treatment indicated   PT Diagnosis B knee pain   Influenced by the following impairments decreased ROM, decreased strength, impaired gait, pain   Functional limitations due to impairments walking   Clinical Presentation Stable/Uncomplicated   Clinical Presentation Rationale LEFS, comorbidities, clinical judgement   Clinical Decision Making (Complexity) Low complexity   Therapy Frequency 1 time/week   Predicted Duration of Therapy Intervention (days/wks) 8 weeks   Risk & Benefits of therapy have been explained Yes   Patient, Family & other staff in agreement with plan of care Yes   Clinical Impression Comments Pt is a 76 y.o. female who presented to the PT clinic today with a rehab diagnosis of B knee pain as evidenced by decresaed ROM, decreased strength, impaired gait and pain. Pt is appropriate for skilled PT to address previously listed impairments in order to decrease difficulty with walking.   Education Assessment   Preferred Learning Style Listening;Reading;Demonstration;Pictures/video   Barriers to Learning No barriers   ORTHO GOALS   PT Ortho Eval Goals 1;2;3;4   Ortho Goal 1   Goal Identifier 1   Goal Description Pt will be able to amb 5,000 steps with less than 3/10 pain.   Target Date 11/02/21   Ortho Goal 2   Goal Identifier 2   Goal Description Pt will demonstrate 5/5 B LE strength in order to decrease difficulty with amb.   Target Date  11/16/21   Ortho Goal 3   Goal Identifier 3   Goal Description Pt will be independent with HEP in order to self manage symptoms.    Target Date 12/03/21   Ortho Goal 4   Goal Identifier 4   Goal Description Pt will be able to amb 10,000 steps with less than 3/10 pain   Target Date 12/03/21   Total Evaluation Time   PT Eval, Low Complexity Minutes (05024) 25   Therapy Certification   Certification date from 10/05/21   Certification date to 12/03/21   Medical Diagnosis chronic pain of both knees       Please contact me with any questions or concerns.    Thank you for your referral,     Jeannie Billingsley, PT, DPT  Physical Therapist  [place] [id number] [place] [id number] [phone]

## 2021-10-09 ENCOUNTER — OFFICE VISIT (OUTPATIENT)
Dept: FAMILY MEDICINE | Facility: CLINIC | Age: 76
End: 2021-10-09
Payer: MEDICARE

## 2021-10-09 VITALS
RESPIRATION RATE: 16 BRPM | WEIGHT: 196 LBS | TEMPERATURE: 97.7 F | OXYGEN SATURATION: 98 % | BODY MASS INDEX: 31.4 KG/M2 | SYSTOLIC BLOOD PRESSURE: 144 MMHG | HEART RATE: 67 BPM | DIASTOLIC BLOOD PRESSURE: 78 MMHG
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
## 2021-10-09 DIAGNOSIS — S70.362A TICK BITE OF LEFT THIGH, INITIAL ENCOUNTER: Primary | ICD-10-CM

## 2021-10-09 PROCEDURE — 99213 OFFICE O/P EST LOW 20 MIN: CPT | Performed by: PHYSICIAN ASSISTANT

## 2021-10-09 RX ORDER — DOXYCYCLINE 100 MG/1
200 TABLET ORAL ONCE
Qty: 2 TABLET | Refills: 0 | Status: SHIPPED | OUTPATIENT
Start: 2021-10-09 | End: 2021-10-09

## 2021-10-09 NOTE — PATIENT INSTRUCTIONS
"Monitor for stage I Lyme disease symptoms over the next 3 to 30 days.  If you develop stage I Lyme disease symptoms return to your primary care provider for reevaluation treatment or return to the walk-in care if you are unable to get an appointment with your primary care provider.  Take the medication as written.  This is a one-time dose for Lyme disease prophylactic treatment.  Follow suggestions in the handouts below for tick bites.  If the Lyme disease test was drawn for you, you will have follow-up next week with your primary care provider for reevaluation and interpretation of the laboratory value and discussion of the the treatment plan going forward after the lab results are returned.      Patient Education   Tick Bites  Ticks are small arachnids that feed on the blood of rodents, rabbits, birds, deer, dogs, and people. A tick bite may cause a reaction like a spider bite. You may have redness, itching, and slight swelling at the site. Sometimes you may have no reaction where the tick bit you.  Ticks may gorge themselves for days before you find and remove them. The bites themselves aren't cause for concern. But ticks can carry and pass on illnesses such as Lyme disease and [place] spotted fever. Both diseases begin with a rash and symptoms similar to the flu. In advanced stages, these diseases can be quite serious.     A \"bull's eye\" rash is a common symptom of Lyme disease.   When to go to the emergency room (ER)  Not all ticks carry disease. And a tick must stay attached for at least 24 hours to infect you. If you find a tick, don't panic. Try to carefully remove it with tweezers. Grasp the tick near its head and pull without twisting. If you can't easily dislodge the tick or if you leave the head in your skin, get medical care right away.  What to expect in the ER    The tick or any parts of the tick will be removed and the bite will be cleaned.    To prevent disease, you may be given antibiotics. " Both Lyme disease and [place] spotted fever respond quickly to these medicines.    You may be asked to see your healthcare provider for a blood test to check for Lyme disease.  Follow-up care  Some states and counties have services that test ticks for Lyme disease and other diseases. Check with your local officials to see if this service is available in your area.  If you remove a tick yourself, watch for signs of a tick-borne illness. Symptoms may show up within a few days or weeks after a bite. Call your healthcare provider if you notice any of the following:    Rash. The rash may spread outward in a ring from a hard white lump. Or it may move up your arms and legs to your chest.    Chills and fever    Body aches and joint pain    Severe headache  Date Last Reviewed: 12/1/2016 2000-2017 [employer]. [address] [place] [address]. All rights reserved. This information is not intended as a substitute for professional medical care. Always follow your healthcare professional's instructions.         Patient Education   Preventing Lyme Disease  Ticks are small spider-like arachnids that feed on the blood of animals and humans. They can carry the bacteria that cause Lyme disease. The bacteria can pass to a person from a tick bite. (It is not passed from person to person.) Lyme disease can lead to serious health problems if it is not treated with antibiotics. The best way to prevent Lyme disease is to avoid being bitten by a tick.     The tick that carries Lyme disease is very small--about the size of a poppy seed.   Preventing tick bites  The disease is carried by the blacklegged tick, also called a  deer tick.  Young ticks called nymphs are the most likely to carry the bacteria. They are very small--about the size of a poppy seed. They can be found on deer, rodents, and birds. Often the animal brushes the tick onto leaves or other plants as it runs through the woods and then the tick  lives in bushes, grasses, and dead leaves. The most active time of year for infected ticks varies by region of the country. In the [place] and [place], they are more active from April to September. In the [place], they may be more active from December to February. But you can still be bitten at other times of the year. Below are tips for protecting yourself from tick bites.  Protect your body    Wear clothes that protect you. Clothing can help protect you from tick bites. Wear long pants and long sleeves in outdoor areas where ticks may live. Tuck your shirt into your pants. Tuck pant legs into your socks. And wear light colors so you can more easily see ticks on your clothes.    Use insect repellent. Spray insect repellent containing at least 20 percent DEET on your exposed skin. You can also use it on clothing, shoes, and camping gear. Avoid getting DEET on children s hands, mouth, or eyes. You can use products with permethrin on clothing, shoes, and camping gear. But do not spray permethrin on skin. It will cause a rash. Follow the directions on the package of the spray you use. For more information on bug sprays, visit the National Pesticide Information Center at http://npic.ors.edu.    Avoid tick-infested areas. Avoid brushing against grasses, bushes, and other plants. Avoid walking through dead leaves and other ground vegetation. Do not sit on fallen logs. And avoid areas with large numbers of deer and rodents.    Check yourself for ticks. After being outdoors, check your clothes and skin for ticks. Keep in mind that the ticks are about the size of a poppy seed. Use both a hand-held and a full-length mirror to view all of your skin. Pay special attention to areas with hair. Make sure to thoroughly check these areas:  ? Scalp  ? Behind the ears  ? Armpits  ? Belly button  ? Waist  ? Groin  ? Backs of the knees    Use the clothes dryer. Putting clothing or bedding into a clothes dryer for 1 hour at high heat has been  shown to kill ticks.  Control ticks around your home    Create tick-free safety zones. Ticks that transmit Lyme disease live in ground vegetation. Ticks crawl onto people from shrubs and grasses in and around wooded areas. Cut bushes and other plants away from the deck or patio and any child play areas. Keep all grasses cut short. Remove dead leaves and other dead vegetation. Do not put children s play equipment near wooded areas. Put wood chips or gravel on the ground between lawns and wooded areas.    Use pesticides. You can apply them yourself or hire a pest control expert. States have different regulations about pesticides. Ask your local health department for information.    Keep deer away. Deer often carry the ticks that can infect you with Lyme disease. Do not attempt to pet or feed deer. Ask your local [place] center about deer-resistant plants. Ask your local health department about deer control in your area.    Prevent ticks on pets. Pets can bring ticks into your home. Use tick control medicine as advised by your . Check your pet for ticks after it comes indoors. Pay special attention to the ears.    Ask about local tick-control methods. Some states have tick-control programs. Local health departments may be using methods that can help you control ticks at home.  If you have a tick  If you find a tick on your skin, do not panic. Most ticks don't carry Lyme disease. And the tick must be attached for 36 to 48 hours before it might infect you. If you find a tick on you, here s what to do:    If the tick is not yet attached to your skin, remove the tick with tweezers or a tissue. Flush it down the toilet. If you see a tick on your clothes, use a piece of tape to lift it off. Do not touch it with your bare hands.    If the tick is attached to your skin:  ? Carefully remove the tick with tweezers. If you don t have tweezers, use your fingers protected by a paper towel or a thin cloth. Grasp the tick  as close to your skin as possible. Pull slowly and gently to remove the tick. The tick may not let go right away. Do not pull harder. Be patient and keep trying gently. Do not twist the head or body as you pull. Do not crush or squeeze the body. This can release the bacteria into your body. Never use a hot match, petroleum jelly, or other products to remove a tick. (If you can t remove the tick or if part of the tick remains in the skin, call your healthcare provider right away.)  ? Wash your skin with soap and water after you remove the tick. This will help ensure you remove any bacteria.  ? If you can, save the tick in a tightly sealed glass or plastic container. Take it to your healthcare provider. He or she may be able to have someone identify if it is the type of tick that transmits Lyme.  ? Call your healthcare provider and describe the bite and the tick. You may be asked to come in for an exam. You may be tested for Lyme. You may also be prescribed antibiotics to help prevent infection.  ? Over the next month, watch for the symptoms below, especially a rash at the site of the bite.  Symptoms of Lyme disease  Call your healthcare provider if you develop any symptoms of Lyme disease, even if you don t remember being bitten. These include:    A round, red rash (called a bull s-eye rash)    Fever and chills    Tiredness or body aches    Headache or a stiff neck    Joint pain and swelling (arthritis), especially in large joints such as the knees   To learn more    Centers for Disease Control and Prevention: www.cdc.gov/lyme    American Lyme Disease Foundation: www.aldf.com  Date Last Reviewed: 11/1/2016 2000-2017 [employer]. [address] [place] [id number]. All rights reserved. This information is not intended as a substitute for professional medical care. Always follow your healthcare professional's instructions.         Patient Education   Tick Bite (No Antibiotics)    You have been  bitten by a tick. Ticks are small arachnids that feed on the blood of rodents, rabbits, birds, deer, dogs, and people. A tick bite may cause a reaction like a spider bite. You may have redness, itching, and slight swelling at the site. Sometimes you may have no reaction where the tick bit you.  Most tick bites are harmless. But some ticks carry diseases, such as Lyme disease or [place] spotted fever. These can be passed to people at the time of the bite. Lyme disease is of greatest concern. Right now you have no symptoms of Lyme disease or other serious reaction to the bite. It is important to watch for the warning signs, which could appear weeks to months after the tick bite.  Home care  The following will help you care for your bite at home:    If itching is a problem, avoid tight clothing and anything that heats up your skin. This includes hot showers or baths and direct sunlight. This will tend to make the itching worse.    An ice pack will reduce local areas of redness and itching. Make your own ice pack by putting ice cubes in a zip-top plastic bag and wrapping it in a thin towel. Creams containing benzocaine will reduce itching. These creams are available over the counter.    You can use an antihistamine with diphenhydramine if your doctor did not give you another antihistamine. This medicine may be used to reduce itching if large areas of the skin are involved. It is available at drugstores and groceries. If symptoms continue, talk with your doctor or pharmacist about over-the-counter medicines.  Follow-up care  Follow up with your healthcare provider, or as advised.  When to seek medical advice  Call your healthcare provider right away if any of these occur:  Signs of local infection. Watch for these during the next few days.    Increasing redness around the bite site    Increased pain or swelling    Fever over 100.4 F (38.0 C), or as directed by your healthcare provider    Fluid draining from the  bite area  Signs of tick-related disease. Watch for these during the next few weeks to months.    Circular, red, ring-like rash appears at the bite area within 1 to 3 weeks    A non-itchy red rash develops on your wrists or ankles and spreads. It may become purple (petechiae).    Red eyes    Tiredness, fever or chills, nausea or vomiting    Neck pain or stiffness, headache, or confusion    Muscle or bone aches    Irregular or rapid heartbeat    Joint pain or swelling, especially in the knee    Numbness, tingling, or weakness in the arms or legs    Weakness on one side of the face  Date Last Reviewed: 10/1/2016    4733-3826 [employer]. [address] [place] [address], [place] [address]. All rights reserved. This information is not intended as a substitute for professional medical care. Always follow your healthcare professional's instructions.         Patient Education   Tick Facts    Ticks are small arachnids that feed on the blood of rodents, rabbits, birds, deer, dogs, and humans. Ticks do not fly and do not drop from trees. They climb to the tips of plants along trails and attach themselves to you as you brush against the plant. Ticks may also attach to you if you come in contact with an infested animal.  Avoiding ticks  The following tips will help you avoid ticks:    When walking in tick-infested areas, tuck your pants into your boots or socks, and tuck your shirt into your pants.    Wear light-colored clothing so you can easily see any ticks that get on you.    Apply a tick repellent to pants, socks, and shoes.    Avoid brushing against the plants along trails.    Check yourself and your children at the end of each day when hiking through tick-infested areas. Don't forget to check in your hair as well.  Removing ticks  Removing ticks right away will reduce the chance of disease. Here are some tips for removing ticks:    If possible, have someone else remove the tick from you.    Protect your hands from  exposure to the tick by using a tissue or rubber glove.    Ticks have hook-like barbs on their mouth, which they use to attach themselves. Use tweezers or small needle-nose pliers instead of your fingers when removing a tick. Grasp the head as close to the skin as possible. Be careful not to squeeze the body, which would inject more fluid from the tick into your skin.    Pull gently and slowly away from skin until the mouthparts let go. If the tick fails to let go, stop pulling. While holding the head with tweezers, slowly turn it 90 degrees. Then, gently pull away from the skin again. This movement may unlock the tick's jaw and make it easier to remove.    Once you have removed the tick, look closely at the bite area. If you think there are still parts of the tick in your skin that you can't remove, contact your doctor.    Wash your hands and the bite site with soap and water.  Do not:    Crush or squeeze the tick with the tweezers.    Jerk the tick.    Burn or prick the tick.    Try to suffocate the tick with petroleum jelly or nail polish.  Identifying ticks  Most tick bites are harmless. But some ticks carry diseases, such as Lyme disease and [place]im [place] spotted fever. These can spread to people. Lyme disease is of greatest concern. It is carried by only one type of tick, the deer tick. Many public health departments are able to identify a tick to figure out if it is the type that causes Lyme disease. If this service is available in your area, bring the removed tick in a zip-top bag or jar to the public health department for identification. If you cannot identify the tick and if you were bitten in an area of the country where there is a risk of Lyme disease, contact your doctor.  Date Last Reviewed: 9/1/2016 2000-2017 [employer]. [address] [place] [address], [place] [address]. All rights reserved. This information is not intended as a substitute for professional medical care. Always follow your  healthcare professional's instructions.

## 2021-10-09 NOTE — PROGRESS NOTES
Patient presents with:  Insect Bites: left upper thigh near groin, red, swelling, streaking red, itching, just noticed it 2 hours ago      Clinical Decision Making:  I had a conversation with patient stating that I do think that she had a tick bite that she successfully removed from the thigh.  There is still retained tick parts that were removed in the office encounter.  Patient did not have a characteristic ECM rash.  There was a small foreign protein reaction.  Patient is given doxycycline single prophylactic dose.  He will return to clinic in 3 to 30 days of stage I Lyme disease symptoms should present.  Questions were answered to her satisfaction before discharge.        ICD-10-CM    1. Tick bite of left thigh, initial encounter  S70.362A doxycycline monohydrate (ADOXA) 100 MG tablet    W57.XXXA        Patient Instructions     Monitor for stage I Lyme disease symptoms over the next 3 to 30 days.  If you develop stage I Lyme disease symptoms return to your primary care provider for reevaluation treatment or return to the walk-in care if you are unable to get an appointment with your primary care provider.  Take the medication as written.  This is a one-time dose for Lyme disease prophylactic treatment.  Follow suggestions in the handouts below for tick bites.  If the Lyme disease test was drawn for you, you will have follow-up next week with your primary care provider for reevaluation and interpretation of the laboratory value and discussion of the the treatment plan going forward after the lab results are returned.      Patient Education   Tick Bites  Ticks are small arachnids that feed on the blood of rodents, rabbits, birds, deer, dogs, and people. A tick bite may cause a reaction like a spider bite. You may have redness, itching, and slight swelling at the site. Sometimes you may have no reaction where the tick bit you.  Ticks may gorge themselves for days before you find and remove them. The bites  "themselves aren't cause for concern. But ticks can carry and pass on illnesses such as Lyme disease and [place] spotted fever. Both diseases begin with a rash and symptoms similar to the flu. In advanced stages, these diseases can be quite serious.     A \"bull's eye\" rash is a common symptom of Lyme disease.   When to go to the emergency room (ER)  Not all ticks carry disease. And a tick must stay attached for at least 24 hours to infect you. If you find a tick, don't panic. Try to carefully remove it with tweezers. Grasp the tick near its head and pull without twisting. If you can't easily dislodge the tick or if you leave the head in your skin, get medical care right away.  What to expect in the ER    The tick or any parts of the tick will be removed and the bite will be cleaned.    To prevent disease, you may be given antibiotics. Both Lyme disease and [place] spotted fever respond quickly to these medicines.    You may be asked to see your healthcare provider for a blood test to check for Lyme disease.  Follow-up care  Some [facility] and [facility] have services that test ticks for Lyme disease and other diseases. Check with your local officials to see if this service is available in your area.  If you remove a tick yourself, watch for signs of a tick-borne illness. Symptoms may show up within a few days or weeks after a bite. Call your healthcare provider if you notice any of the following:    Rash. The rash may spread outward in a ring from a hard white lump. Or it may move up your arms and legs to your chest.    Chills and fever    Body aches and joint pain    Severe headache  Date Last Reviewed: 12/1/2016 2000-2017 [employer]. [address] [place] [id number]. All rights reserved. This information is not intended as a substitute for professional medical care. Always follow your healthcare professional's instructions.         Patient Education   Preventing Lyme " Disease  Ticks are small spider-like arachnids that feed on the blood of animals and humans. They can carry the bacteria that cause Lyme disease. The bacteria can pass to a person from a tick bite. (It is not passed from person to person.) Lyme disease can lead to serious health problems if it is not treated with antibiotics. The best way to prevent Lyme disease is to avoid being bitten by a tick.     The tick that carries Lyme disease is very small--about the size of a poppy seed.   Preventing tick bites  The disease is carried by the blacklegged tick, also called a  deer tick.  Young ticks called nymphs are the most likely to carry the bacteria. They are very small--about the size of a poppy seed. They can be found on deer, rodents, and birds. Often the animal brushes the tick onto leaves or other plants as it runs through the woods and then the tick lives in bushes, grasses, and dead leaves. The most active time of year for infected ticks varies by region of the country. In the East and [place], they are more active from April to September. In the West, they may be more active from December to February. But you can still be bitten at other times of the year. Below are tips for protecting yourself from tick bites.  Protect your body    Wear clothes that protect you. Clothing can help protect you from tick bites. Wear long pants and long sleeves in outdoor areas where ticks may live. Tuck your shirt into your pants. Tuck pant legs into your socks. And wear light colors so you can more easily see ticks on your clothes.    Use insect repellent. Spray insect repellent containing at least 20 percent DEET on your exposed skin. You can also use it on clothing, shoes, and camping gear. Avoid getting DEET on children s hands, mouth, or eyes. You can use products with permethrin on clothing, shoes, and camping gear. But do not spray permethrin on skin. It will cause a rash. Follow the directions on the package of the spray  you use. For more information on bug sprays, visit the National Pesticide Information Center at http://npic.[facility].edu.    Avoid tick-infested areas. Avoid brushing against grasses, bushes, and other plants. Avoid walking through dead leaves and other ground vegetation. Do not sit on fallen logs. And avoid areas with large numbers of deer and rodents.    Check yourself for ticks. After being outdoors, check your clothes and skin for ticks. Keep in mind that the ticks are about the size of a poppy seed. Use both a hand-held and a full-length mirror to view all of your skin. Pay special attention to areas with hair. Make sure to thoroughly check these areas:  ? Scalp  ? Behind the ears  ? Armpits  ? Belly button  ? Waist  ? Groin  ? Backs of the knees    Use the clothes dryer. Putting clothing or bedding into a clothes dryer for 1 hour at high heat has been shown to kill ticks.  Control ticks around your home    Create tick-free safety zones. Ticks that transmit Lyme disease live in ground vegetation. Ticks crawl onto people from shrubs and grasses in and around wooded areas. Cut bushes and other plants away from the deck or patio and any child play areas. Keep all grasses cut short. Remove dead leaves and other dead vegetation. Do not put children s play equipment near wooded areas. Put wood chips or gravel on the ground between lawns and wooded areas.    Use pesticides. You can apply them yourself or hire a pest control expert. States have different regulations about pesticides. Ask your local health department for information.    Keep deer away. Deer often carry the ticks that can infect you with Lyme disease. Do not attempt to pet or feed deer. Ask your local [place] center about deer-resistant plants. Ask your local health department about deer control in your area.    Prevent ticks on pets. Pets can bring ticks into your home. Use tick control medicine as advised by your . Check your pet for ticks after  it comes indoors. Pay special attention to the ears.    Ask about local tick-control methods. Some states have tick-control programs. Local health departments may be using methods that can help you control ticks at home.  If you have a tick  If you find a tick on your skin, do not panic. Most ticks don't carry Lyme disease. And the tick must be attached for 36 to 48 hours before it might infect you. If you find a tick on you, here s what to do:    If the tick is not yet attached to your skin, remove the tick with tweezers or a tissue. Flush it down the toilet. If you see a tick on your clothes, use a piece of tape to lift it off. Do not touch it with your bare hands.    If the tick is attached to your skin:  ? Carefully remove the tick with tweezers. If you don t have tweezers, use your fingers protected by a paper towel or a thin cloth. Grasp the tick as close to your skin as possible. Pull slowly and gently to remove the tick. The tick may not let go right away. Do not pull harder. Be patient and keep trying gently. Do not twist the head or body as you pull. Do not crush or squeeze the body. This can release the bacteria into your body. Never use a hot match, petroleum jelly, or other products to remove a tick. (If you can t remove the tick or if part of the tick remains in the skin, call your healthcare provider right away.)  ? Wash your skin with soap and water after you remove the tick. This will help ensure you remove any bacteria.  ? If you can, save the tick in a tightly sealed glass or plastic container. Take it to your healthcare provider. He or she may be able to have someone identify if it is the type of tick that transmits Lyme.  ? Call your healthcare provider and describe the bite and the tick. You may be asked to come in for an exam. You may be tested for Lyme. You may also be prescribed antibiotics to help prevent infection.  ? Over the next month, watch for the symptoms below, especially a rash at  the site of the bite.  Symptoms of Lyme disease  Call your healthcare provider if you develop any symptoms of Lyme disease, even if you don t remember being bitten. These include:    A round, red rash (called a bull s-eye rash)    Fever and chills    Tiredness or body aches    Headache or a stiff neck    Joint pain and swelling (arthritis), especially in large joints such as the knees   To learn more    Centers for Disease Control and Prevention: www.cdc.gov/lyme    American Lyme Disease Foundation: www.aldf.com  Date Last Reviewed: 11/1/2016 2000-2017 [employer]. [address] [place] [address]. All rights reserved. This information is not intended as a substitute for professional medical care. Always follow your healthcare professional's instructions.         Patient Education   Tick Bite (No Antibiotics)    You have been bitten by a tick. Ticks are small arachnids that feed on the blood of rodents, rabbits, birds, deer, dogs, and people. A tick bite may cause a reaction like a spider bite. You may have redness, itching, and slight swelling at the site. Sometimes you may have no reaction where the tick bit you.  Most tick bites are harmless. But some ticks carry diseases, such as Lyme disease or [place]im [place] spotted fever. These can be passed to people at the time of the bite. Lyme disease is of greatest concern. Right now you have no symptoms of Lyme disease or other serious reaction to the bite. It is important to watch for the warning signs, which could appear weeks to months after the tick bite.  Home care  The following will help you care for your bite at home:    If itching is a problem, avoid tight clothing and anything that heats up your skin. This includes hot showers or baths and direct sunlight. This will tend to make the itching worse.    An ice pack will reduce local areas of redness and itching. Make your own ice pack by putting ice cubes in a zip-top plastic bag and  wrapping it in a thin towel. Creams containing benzocaine will reduce itching. These creams are available over the counter.    You can use an antihistamine with diphenhydramine if your doctor did not give you another antihistamine. This medicine may be used to reduce itching if large areas of the skin are involved. It is available at drugstores and groceries. If symptoms continue, talk with your doctor or pharmacist about over-the-counter medicines.  Follow-up care  Follow up with your healthcare provider, or as advised.  When to seek medical advice  Call your healthcare provider right away if any of these occur:  Signs of local infection. Watch for these during the next few days.    Increasing redness around the bite site    Increased pain or swelling    Fever over 100.4 F (38.0 C), or as directed by your healthcare provider    Fluid draining from the bite area  Signs of tick-related disease. Watch for these during the next few weeks to months.    Circular, red, ring-like rash appears at the bite area within 1 to 3 weeks    A non-itchy red rash develops on your wrists or ankles and spreads. It may become purple (petechiae).    Red eyes    Tiredness, fever or chills, nausea or vomiting    Neck pain or stiffness, headache, or confusion    Muscle or bone aches    Irregular or rapid heartbeat    Joint pain or swelling, especially in the knee    Numbness, tingling, or weakness in the arms or legs    Weakness on one side of the face  Date Last Reviewed: 10/1/2016    3771-9885 [employer]. [address] [place] [address]. All rights reserved. This information is not intended as a substitute for professional medical care. Always follow your healthcare professional's instructions.         Patient Education   Tick Facts    Ticks are small arachnids that feed on the blood of rodents, rabbits, birds, deer, dogs, and humans. Ticks do not fly and do not drop from trees. They climb to the tips of plants  along trails and attach themselves to you as you brush against the plant. Ticks may also attach to you if you come in contact with an infested animal.  Avoiding ticks  The following tips will help you avoid ticks:    When walking in tick-infested areas, tuck your pants into your boots or socks, and tuck your shirt into your pants.    Wear light-colored clothing so you can easily see any ticks that get on you.    Apply a tick repellent to pants, socks, and shoes.    Avoid brushing against the plants along trails.    Check yourself and your children at the end of each day when hiking through tick-infested areas. Don't forget to check in your hair as well.  Removing ticks  Removing ticks right away will reduce the chance of disease. Here are some tips for removing ticks:    If possible, have someone else remove the tick from you.    Protect your hands from exposure to the tick by using a tissue or rubber glove.    Ticks have hook-like barbs on their mouth, which they use to attach themselves. Use tweezers or small needle-nose pliers instead of your fingers when removing a tick. Grasp the head as close to the skin as possible. Be careful not to squeeze the body, which would inject more fluid from the tick into your skin.    Pull gently and slowly away from skin until the mouthparts let go. If the tick fails to let go, stop pulling. While holding the head with tweezers, slowly turn it 90 degrees. Then, gently pull away from the skin again. This movement may unlock the tick's jaw and make it easier to remove.    Once you have removed the tick, look closely at the bite area. If you think there are still parts of the tick in your skin that you can't remove, contact your doctor.    Wash your hands and the bite site with soap and water.  Do not:    Crush or squeeze the tick with the tweezers.    Jerk the tick.    Burn or prick the tick.    Try to suffocate the tick with petroleum jelly or nail polish.  Identifying ticks  Most  tick bites are harmless. But some ticks carry diseases, such as Lyme disease and Nasim [place] spotted fever. These can spread to people. Lyme disease is of greatest concern. It is carried by only one type of tick, the deer tick. Many public health departments are able to identify a tick to figure out if it is the type that causes Lyme disease. If this service is available in your area, bring the removed tick in a zip-top bag or jar to the public health department for identification. If you cannot identify the tick and if you were bitten in an area of the country where there is a risk of Lyme disease, contact your doctor.  Date Last Reviewed: 9/1/2016 2000-2017 [employer]. [address] [place] [address], [place] [address]. All rights reserved. This information is not intended as a substitute for professional medical care. Always follow your healthcare professional's instructions.               HPI:  Ruba Farmer is a 76 year old female who presents today for a tick bite to the left anterior thigh.  Patient shares that she noticed that there has been a tick on the left anterior thigh that was not there for more than 24 hours.  She did identified and successfully removed it with a tweezers.  There is still a small amount of tick mouth part remained in the skin would like to have that really moved.  Also concerned that there is some redness around the tick bite area but is not consistent with a ECM.  She does not have any other stage I Lyme disease symptoms to report.    History obtained from chart review and the patient.    Problem List:  2019-01: Strain of lumbar region  2018-04: Bronchiectasis with acute exacerbation (H)  2017-02: Mild persistent asthma without complication  2014-01: Recurrent UTI  2013-06: Senile nuclear sclerosis  2013-02: Polyp of colon, adenomatous  2012-05: Advanced directives, counseling/discussion  2011-07: GERD (gastroesophageal reflux disease)  2010-10: CARDIOVASCULAR  SCREENING; LDL GOAL LESS THAN 160  2009-05: Anxiety  2008-01: History of urinary tract infection  Bronchitis  Pneumonia  Nonallergic rhinitis  Diagnostic skin and sensitization tests (aka ALLERGENS)      Past Medical History:   Diagnosis Date     Bronchitis     pt reports yearly bronchitis     Diagnostic skin and sensitization tests (aka ALLERGENS) 7/22/15 IgE tests all NEGATIVE for environmental allergens.      Nonallergic rhinitis     7/22/15 IgE tests all NEGATIVE for environmental allergens.      Pneumonia     hx of several episodes of pneumonia     Recurrent UTI      Uncomplicated asthma        Social History     Tobacco Use     Smoking status: Never Smoker     Smokeless tobacco: Never Used   Substance Use Topics     Alcohol use: Yes     Comment: 2 daily (wine)       Review of Systems  Above in HPI otherwise negative.    Vitals:    10/09/21 1749   BP: (!) 144/78   Pulse: 67   Resp: 16   Temp: 97.7  F (36.5  C)   TempSrc: Oral   SpO2: 98%   Weight: 88.9 kg (196 lb)     General: Patient is resting comfortably no acute distress is afebrile  HEENT: Head is normocephalic atraumatic   eyes are PERRL EOMI sclera anicteric   Skin: With a tick bite on the left anterior thigh thigh.  With loupe magnification and a tweezer forceps the remaining tick mouth parts were removed.  Patient tolerated procedure very well.  Lamination of procedure was performed with Lian gleason nurse in the office for chaperoned supervised exam.    Physical Exam     At the end of the encounter, I discussed results, diagnosis, medications. Discussed red flags for immediate return to clinic/ER, as well as indications for follow up if no improvement. Patient understood and agreed to plan. Patient was stable for discharge.

## 2021-10-12 ENCOUNTER — HOSPITAL ENCOUNTER (OUTPATIENT)
Dept: PHYSICAL THERAPY | Facility: CLINIC | Age: 76
Setting detail: THERAPIES SERIES
End: 2021-10-12
Attending: FAMILY MEDICINE
Payer: MEDICARE

## 2021-10-12 PROCEDURE — 97110 THERAPEUTIC EXERCISES: CPT | Mod: GP | Performed by: PHYSICAL MEDICINE & REHABILITATION

## 2021-10-20 ENCOUNTER — HOSPITAL ENCOUNTER (OUTPATIENT)
Dept: PHYSICAL THERAPY | Facility: CLINIC | Age: 76
Setting detail: THERAPIES SERIES
End: 2021-10-20
Attending: INTERNAL MEDICINE
Payer: MEDICARE

## 2021-10-20 PROCEDURE — 97110 THERAPEUTIC EXERCISES: CPT | Mod: GP | Performed by: PHYSICAL MEDICINE & REHABILITATION

## 2021-10-26 ENCOUNTER — OFFICE VISIT (OUTPATIENT)
Dept: FAMILY MEDICINE | Facility: CLINIC | Age: 76
End: 2021-10-26
Payer: MEDICARE

## 2021-10-26 ENCOUNTER — HOSPITAL ENCOUNTER (OUTPATIENT)
Dept: PHYSICAL THERAPY | Facility: CLINIC | Age: 76
Setting detail: THERAPIES SERIES
End: 2021-10-26
Attending: INTERNAL MEDICINE
Payer: MEDICARE

## 2021-10-26 VITALS
SYSTOLIC BLOOD PRESSURE: 130 MMHG | BODY MASS INDEX: 31.56 KG/M2 | DIASTOLIC BLOOD PRESSURE: 76 MMHG | RESPIRATION RATE: 10 BRPM | HEART RATE: 74 BPM | WEIGHT: 197 LBS

## 2021-10-26 DIAGNOSIS — A69.20 LYME DISEASE: ICD-10-CM

## 2021-10-26 DIAGNOSIS — Z23 NEED FOR PROPHYLACTIC VACCINATION AND INOCULATION AGAINST INFLUENZA: ICD-10-CM

## 2021-10-26 DIAGNOSIS — Z00.00 ENCOUNTER FOR MEDICARE ANNUAL WELLNESS EXAM: Primary | ICD-10-CM

## 2021-10-26 PROCEDURE — 97110 THERAPEUTIC EXERCISES: CPT | Mod: GP | Performed by: PHYSICAL MEDICINE & REHABILITATION

## 2021-10-26 PROCEDURE — 90662 IIV NO PRSV INCREASED AG IM: CPT | Performed by: FAMILY MEDICINE

## 2021-10-26 PROCEDURE — G0439 PPPS, SUBSEQ VISIT: HCPCS | Performed by: FAMILY MEDICINE

## 2021-10-26 PROCEDURE — G0008 ADMIN INFLUENZA VIRUS VAC: HCPCS | Performed by: FAMILY MEDICINE

## 2021-10-26 RX ORDER — DOXYCYCLINE 100 MG/1
100 CAPSULE ORAL 2 TIMES DAILY
Qty: 20 CAPSULE | Refills: 0 | Status: SHIPPED | OUTPATIENT
Start: 2021-10-26 | End: 2022-05-12

## 2021-10-26 ASSESSMENT — ENCOUNTER SYMPTOMS
FEVER: 0
BREAST MASS: 0
PARESTHESIAS: 0
ABDOMINAL PAIN: 0
MYALGIAS: 0
DIARRHEA: 0
PALPITATIONS: 0
CHILLS: 0
DYSURIA: 0
HEMATURIA: 0
HEARTBURN: 0
COUGH: 0
SORE THROAT: 0
ARTHRALGIAS: 1
HEADACHES: 0
CONSTIPATION: 1
EYE PAIN: 0
DIZZINESS: 0
WEAKNESS: 0
HEMATOCHEZIA: 0
JOINT SWELLING: 0
NERVOUS/ANXIOUS: 1
SHORTNESS OF BREATH: 0
FREQUENCY: 1
NAUSEA: 0

## 2021-10-26 ASSESSMENT — PAIN SCALES - GENERAL: PAINLEVEL: NO PAIN (0)

## 2021-10-26 ASSESSMENT — ACTIVITIES OF DAILY LIVING (ADL): CURRENT_FUNCTION: NO ASSISTANCE NEEDED

## 2021-10-26 NOTE — PROGRESS NOTES
"    Information on urinary incontinence and treatment options given to patient.  Answers for HPI/ROS submitted by the patient on 10/26/2021  In general, how would you rate your overall physical health?: good  Frequency of exercise:: 4-5 days/week  Do you usually eat at least 4 servings of fruit and vegetables a day, include whole grains & fiber, and avoid regularly eating high fat or \"junk\" foods? : Yes  Taking medications regularly:: No  Medication side effects:: Other  Activities of Daily Living: no assistance needed  Home safety: no safety concerns identified  Hearing Impairment:: no hearing concerns  In the past 6 months, have you been bothered by leaking of urine?: Yes  abdominal pain: No  Blood in stool: No  Blood in urine: No  chest pain: No  chills: No  congestion: No  constipation: Yes  cough: No  diarrhea: No  dizziness: No  ear pain: No  eye pain: No  nervous/anxious: Yes  fever: No  frequency: Yes  genital sores: No  headaches: No  hearing loss: No  heartburn: No  arthralgias: Yes  joint swelling: No  peripheral edema: No  mood changes: No  myalgias: No  nausea: No  dysuria: No  palpitations: No  Skin sensation changes: No  sore throat: No  urgency: Yes  rash: Yes  shortness of breath: No  visual disturbance: No  weakness: No  pelvic pain: No  vaginal bleeding: No  vaginal discharge: No  tenderness: No  breast mass: No  breast discharge: No  In general, how would you rate your overall mental or emotional health?: good  Additional concerns today:: Yes  Duration of exercise:: 45-60 minutes  Barriers to taking medications:: Side effects        "

## 2021-10-26 NOTE — PATIENT INSTRUCTIONS
Patient Education   Personalized Prevention Plan  You are due for the preventive services outlined below.  Your care team is available to assist you in scheduling these services.  If you have already completed any of these items, please share that information with your care team to update in your medical record.  Health Maintenance Due   Topic Date Due     ANNUAL REVIEW OF HM ORDERS  Never done     Asthma Action Plan - yearly  02/24/2021     Zoster (Shingles) Vaccine (3 of 3) 11/24/2020       Urinary Incontinence, Female (Adult)   Urinary incontinence means loss of bladder control. This problem affects many women, especially as they get older. If you have incontinence, you may be embarrassed to ask for help. But know that this problem can be treated.   Types of Incontinence  There are different types of incontinence. Two of the main types are described here. You can have more than one type.     Stress incontinence. With this type, urine leaks when pressure (stress) is put on the bladder. This may happen when you cough, sneeze, or laugh. Stress incontinence most often occurs because the pelvic floor muscles that support the bladder and urethra are weak. This can happen after pregnancy and vaginal childbirth or a hysterectomy. It can also be due to excess body weight or hormone changes.    Urge incontinence (also called overactive bladder). With this type, a sudden urge to urinate is felt often. This may happen even though there may not be much urine in the bladder. The need to urinate often during the night is common. Urge incontinence most often occurs because of bladder spasms. This may be due to bladder irritation or infection. Damage to bladder nerves or pelvic muscles, constipation, and certain medicines can also lead to urge incontinence.  Treatment depends on the cause. Further evaluation is needed to find the type you have. This will likely include an exam and certain tests. Based on the results, you and  your healthcare provider can then plan treatment. Until a diagnosis is made, the home care tips below can help ease symptoms.   Home care    Do pelvic floor muscle exercises, if they are prescribed. The pelvic floor muscles help support the bladder and urethra. Many women find that their symptoms improve when doing special exercises that strengthen these muscles. To do the exercises, contract the muscles you would use to stop your stream of urine. But do this when you re not urinating. Hold for 10 seconds, then relax. Repeat 10 to 20 times in a row, at least 3 times a day. Your healthcare provider may give you other instructions for how to do the exercises and how often.    Keep a bladder diary. This helps track how often and how much you urinate over a set period of time. Bring this diary with you to your next visit with the provider. The information can help your provider learn more about your bladder problem.    Lose weight, if advised to by your provider. Extra weight puts pressure on the bladder. Your provider can help you create a weight-loss plan that s right for you. This may include exercising more and making certain diet changes.    Don't have foods and drinks that may irritate the bladder. These can include alcohol and caffeinated drinks.    Quit smoking. Smoking and other tobacco use can lead to a long-term (chronic) cough that strains the pelvic floor muscles. Smoking may also damage the bladder and urethra. Talk with your provider about treatments or methods you can use to quit smoking.    If drinking large amounts of fluid makes you have symptoms, you may be advised to limit your fluid intake. You may also be advised to drink most of your fluids during the day and to limit fluids at night.    If you re worried about urine leakage or accidents, you may wear absorbent pads to catch urine. Change the pads often. This helps reduce discomfort. It may also reduce the risk of skin or bladder  infections.    Follow-up care  Follow up with your healthcare provider, or as directed. It may take some to find the right treatment for your problem. But healthy lifestyle changes can be made right away. These include such things as exercising on a regular basis, eating a healthy diet, losing weight (if needed), and quitting smoking. Your treatment plan may include special therapies or medicines. Certain procedures or surgery may also be options. Talk about any questions you have with your provider.   When to seek medical advice  Call the healthcare provider right away if any of these occur:    Fever of 100.4 F (38 C) or higher, or as directed by your provider    Bladder pain or fullness    Belly swelling    Nausea or vomiting    Back pain    Weakness, dizziness, or fainting  Roni last reviewed this educational content on 1/1/2020 2000-2021 [employer]. All rights reserved. This information is not intended as a substitute for professional medical care. Always follow your healthcare professional's instructions.

## 2021-10-26 NOTE — PROGRESS NOTES
"SUBJECTIVE:   Ruba Farmer is a 76 year old female who presents for Preventive Visit.      Patient has been advised of split billing requirements and indicates understanding: Yes   Are you in the first 12 months of your Medicare coverage?  No    Healthy Habits:     In general, how would you rate your overall health?  Good    Frequency of exercise:  4-5 days/week    Duration of exercise:  45-60 minutes    Do you usually eat at least 4 servings of fruit and vegetables a day, include whole grains    & fiber and avoid regularly eating high fat or \"junk\" foods?  Yes    Taking medications regularly:  No    Barriers to taking medications:  Side effects    Medication side effects:  Other    Ability to successfully perform activities of daily living:  No assistance needed    Home Safety:  No safety concerns identified    Hearing Impairment:  No hearing concerns    In the past 6 months, have you been bothered by leaking of urine? Yes    In general, how would you rate your overall mental or emotional health?  Good      PHQ-2 Total Score: 1    Additional concerns today:  Yes    Do you feel safe in your environment? Yes    Have you ever done Advance Care Planning? (For example, a Health Directive, POLST, or a discussion with a medical provider or your loved ones about your wishes): Yes, patient states has an Advance Care Planning document and will bring a copy to the clinic.       Fall risk  Fallen 2 or more times in the past year?: No  Any fall with injury in the past year?: No    Cognitive Screening   1) Repeat 3 items (Leader, Season, Table)    2) Clock draw: NORMAL  3) 3 item recall: Recalls 3 objects  Results: 3 items recalled: COGNITIVE IMPAIRMENT LESS LIKELY    Mini-CogTM Copyright JESÚS Chino. Licensed by the author for use in [facility] [place]; reprinted with permission (licha@.[facility]). All rights reserved.      Do you have sleep apnea, excessive snoring or daytime drowsiness?: yes daytime drowsiness, snores while " on back    Reviewed and updated as needed this visit by clinical staff  Tobacco  Allergies  Meds              Reviewed and updated as needed this visit by Provider                Social History     Tobacco Use     Smoking status: Never Smoker     Smokeless tobacco: Never Used   Substance Use Topics     Alcohol use: Yes     Comment: 2 daily (wine)     If you drink alcohol do you typically have >3 drinks per day or >7 drinks per week? No    Alcohol Use 10/26/2021   Prescreen: >3 drinks/day or >7 drinks/week? No   Prescreen: >3 drinks/day or >7 drinks/week? -   AUDIT SCORE  -           Current providers sharing in care for this patient include:   Patient Care Team:  Xiao Dasilva MD as PCP - General (Family Practice)  Xiao Dasilva MD as Assigned PCP  Albino Gurrola DPM as Assigned Musculoskeletal Provider    The following health maintenance items are reviewed in Epic and correct as of today:  Health Maintenance Due   Topic Date Due     ANNUAL REVIEW OF HM ORDERS  Never done     ASTHMA ACTION PLAN  02/24/2021     INFLUENZA VACCINE (1) 09/01/2021     ZOSTER IMMUNIZATION (3 of 3) 11/24/2020     Labs reviewed in [facility]  Mammogram Screening: Mammogram Screening - Patient over age 75, has elected to continue with screening.      Pertinent mammograms are reviewed under the imaging tab.    Review of Systems   Constitutional: Negative for chills and fever.   HENT: Negative for congestion, ear pain, hearing loss and sore throat.    Eyes: Negative for pain and visual disturbance.   Respiratory: Negative for cough and shortness of breath.    Cardiovascular: Negative for chest pain, palpitations and peripheral edema.   Gastrointestinal: Positive for constipation. Negative for abdominal pain, diarrhea, heartburn, hematochezia and nausea.   Breasts:  Negative for tenderness, breast mass and discharge.   Genitourinary: Positive for frequency and urgency. Negative for dysuria, genital sores, hematuria, pelvic  "pain, vaginal bleeding and vaginal discharge.   Musculoskeletal: Positive for arthralgias. Negative for joint swelling and myalgias.   Skin: Positive for rash.   Neurological: Negative for dizziness, weakness, headaches and paresthesias.   Psychiatric/Behavioral: Negative for mood changes. The patient is nervous/anxious.          OBJECTIVE:   /76   Pulse 74   Resp 10   Wt 89.4 kg (197 lb)   BMI 31.56 kg/m   Estimated body mass index is 31.56 kg/m  as calculated from the following:    Height as of 8/23/21: 1.683 m (5' 6.25\").    Weight as of this encounter: 89.4 kg (197 lb).  Physical Exam  GENERAL APPEARANCE: healthy, alert and no distress  EYES: Eyes grossly normal to inspection, PERRL and conjunctivae and sclerae normal  HENT: ear canals and TM's normal, nose and mouth without ulcers or lesions, oropharynx clear and oral mucous membranes moist  NECK: no adenopathy, no asymmetry, masses, or scars and thyroid normal to palpation  RESP: lungs clear to auscultation - no rales, rhonchi or wheezes  CV: regular rate and rhythm, normal S1 S2, no S3 or S4, no murmur, click or rub, no peripheral edema and peripheral pulses strong  ABDOMEN: soft, nontender, no hepatosplenomegaly, no masses and bowel sounds normal  MS: no musculoskeletal defects are noted and gait is age appropriate without ataxia  SKIN: no suspicious lesions or rashes  NEURO: Normal strength and tone, sensory exam grossly normal, mentation intact and speech normal  PSYCH: mentation appears normal and affect normal/bright    Diagnostic Test Results:  Labs reviewed in Epic    ASSESSMENT / PLAN:   (Z00.00) Encounter for Medicare annual wellness exam  (primary encounter diagnosis)  Comment:   Plan:     (Z23) Need for prophylactic vaccination and inoculation against influenza  Comment:   Plan: INFLUENZA, QUAD, HIGH DOSE, PF, 65YR + (FLUZONE        HD), ADMIN INFLUENZA (For MEDICARE Patients         ONLY) []            (A69.20) Lyme " "disease  Comment:   Plan: doxycycline hyclate (VIBRAMYCIN) 100 MG capsule        Needed extension of Rx       Patient has been advised of split billing requirements and indicates understanding: Yes  COUNSELING:  Reviewed preventive health counseling, as reflected in patient instructions    Estimated body mass index is 31.56 kg/m  as calculated from the following:    Height as of 8/23/21: 1.683 m (5' 6.25\").    Weight as of this encounter: 89.4 kg (197 lb).        She reports that she has never smoked. She has never used smokeless tobacco.      Appropriate preventive services were discussed with this patient, including applicable screening as appropriate for cardiovascular disease, diabetes, osteopenia/osteoporosis, and glaucoma.  As appropriate for age/gender, discussed screening for colorectal cancer, prostate cancer, breast cancer, and cervical cancer. Checklist reviewing preventive services available has been given to the patient.    Reviewed patients plan of care and provided an AVS. The Basic Care Plan (routine screening as documented in Health Maintenance) for Ruba meets the Care Plan requirement. This Care Plan has been established and reviewed with the Patient.    Counseling Resources:  ATP IV Guidelines  Pooled Cohorts Equation Calculator  Breast Cancer Risk Calculator  Breast Cancer: Medication to Reduce Risk  FRAX Risk Assessment  ICSI Preventive Guidelines  Dietary Guidelines for Americans, 2010  [employer]'s MyPlate  ASA Prophylaxis  Lung CA Screening    Xiao Dasilva MD  [facility]    Identified Health Risks:  "

## 2021-10-26 NOTE — NURSING NOTE
"Chief Complaint   Patient presents with     Wellness Visit     /76   Pulse 74   Resp 10   Wt 89.4 kg (197 lb)   BMI 31.56 kg/m   Estimated body mass index is 31.56 kg/m  as calculated from the following:    Height as of 8/23/21: 1.683 m (5' 6.25\").    Weight as of this encounter: 89.4 kg (197 lb).  Patient presents to the clinic using No DME      Health Maintenance that is potentially due pending provider review:    Health Maintenance Due   Topic Date Due     ANNUAL REVIEW OF HM ORDERS  Never done     ASTHMA ACTION PLAN  02/24/2021     INFLUENZA VACCINE (1) 09/01/2021     ZOSTER IMMUNIZATION (3 of 3) 11/24/2020        Will get flu shot.        "

## 2021-11-09 ENCOUNTER — HOSPITAL ENCOUNTER (OUTPATIENT)
Dept: PHYSICAL THERAPY | Facility: CLINIC | Age: 76
Setting detail: THERAPIES SERIES
End: 2021-11-09
Attending: INTERNAL MEDICINE
Payer: MEDICARE

## 2021-11-09 PROCEDURE — 97110 THERAPEUTIC EXERCISES: CPT | Mod: GP | Performed by: PHYSICAL MEDICINE & REHABILITATION

## 2021-11-09 NOTE — PROGRESS NOTES
Outpatient Physical Therapy Discharge Note     Patient: Ruba Farmer  : 1945    Beginning/End Dates of Reporting Period:  10/8/21 to 21    Referring Provider: Guevara Palomares MD    Therapy Diagnosis: B knee pain     Client Self Report: Pt reports doing pretty good, feel as though knees are improving. Can amb 10,000 steps without difficulty and feels ready to cont HEP independently at home.     Objective Measurements:  Objective Measure: B knee ROM  Details: L: 0-131*, R:0-125* (no pain)  Objective Measure: B knee and hip strength  Details: 5/5 B hips and knees (no pain)     Outcome Measures (most recent score):  LEFS: 66/80 (35/80 at initial eval)    Goals:  Goal Identifier 1   Goal Description Pt will be able to amb 5,000 steps with less than 3/10 pain.   Target Date 21   Date Met  21   Progress (detail required for progress note):       Goal Identifier 2   Goal Description Pt will demonstrate 5/5 B LE strength in order to decrease difficulty with amb.   Target Date 21   Date Met  21   Progress (detail required for progress note):       Goal Identifier 3   Goal Description Pt will be independent with HEP in order to self manage symptoms.    Target Date 21   Date Met  21   Progress (detail required for progress note):       Goal Identifier 4   Goal Description Pt will be able to amb 10,000 steps with less than 3/10 pain   Target Date 21   Date Met  21   Progress (detail required for progress note):       Progress toward goals: Pt attended 5 PT sessions, achieving 4/4 short term and long term goals. Pt's ROM, strength and pain improved and pt has returned to PLOF. Pt is appropriate for D/C at this time as she has met all goals and is independent with HEP.     Plan:  Discharge from therapy.    Discharge:    Reason for Discharge: Patient has met all goals.    Equipment Issued: therabands    Discharge Plan: Patient to continue home program.    Please contact  me with any questions or concerns.    Thank you for your referral,     Jeannie Billingsley, PT, DPT  Physical Therapist  [address] [address] [phone]

## 2021-11-29 ENCOUNTER — E-VISIT (OUTPATIENT)
Dept: FAMILY MEDICINE | Facility: CLINIC | Age: 76
End: 2021-11-29
Payer: MEDICARE

## 2021-11-29 DIAGNOSIS — R09.81 NASAL CONGESTION: Primary | ICD-10-CM

## 2021-11-29 PROCEDURE — 99421 OL DIG E/M SVC 5-10 MIN: CPT | Performed by: FAMILY MEDICINE

## 2022-03-23 ENCOUNTER — OFFICE VISIT (OUTPATIENT)
Dept: FAMILY MEDICINE | Facility: CLINIC | Age: 77
End: 2022-03-23
Payer: MEDICARE

## 2022-03-23 VITALS
TEMPERATURE: 97.8 F | DIASTOLIC BLOOD PRESSURE: 74 MMHG | OXYGEN SATURATION: 98 % | HEART RATE: 78 BPM | SYSTOLIC BLOOD PRESSURE: 136 MMHG

## 2022-03-23 DIAGNOSIS — L82.1 SEBORRHEIC KERATOSIS: ICD-10-CM

## 2022-03-23 DIAGNOSIS — G89.29 CHRONIC PAIN OF RIGHT KNEE: ICD-10-CM

## 2022-03-23 DIAGNOSIS — M25.561 CHRONIC PAIN OF RIGHT KNEE: ICD-10-CM

## 2022-03-23 DIAGNOSIS — G89.29 CHRONIC UPPER BACK PAIN: Primary | ICD-10-CM

## 2022-03-23 DIAGNOSIS — J32.9 CHRONIC CONGESTION OF PARANASAL SINUS: ICD-10-CM

## 2022-03-23 DIAGNOSIS — M54.9 CHRONIC UPPER BACK PAIN: Primary | ICD-10-CM

## 2022-03-23 DIAGNOSIS — J47.1 BRONCHIECTASIS WITH ACUTE EXACERBATION (H): ICD-10-CM

## 2022-03-23 PROCEDURE — 99213 OFFICE O/P EST LOW 20 MIN: CPT | Mod: 25 | Performed by: FAMILY MEDICINE

## 2022-03-23 PROCEDURE — 17110 DESTRUCTION B9 LES UP TO 14: CPT | Performed by: FAMILY MEDICINE

## 2022-03-23 ASSESSMENT — PAIN SCALES - GENERAL: PAINLEVEL: NO PAIN (0)

## 2022-03-23 ASSESSMENT — ASTHMA QUESTIONNAIRES: ACT_TOTALSCORE: 25

## 2022-03-23 NOTE — PATIENT INSTRUCTIONS
Try yogurt daily for anti yeast option     Set up to see Dr Velázquez again for hearing and sinuses     Set up PT

## 2022-03-23 NOTE — PROGRESS NOTES
"  Assessment & Plan     Chronic upper back pain    - Physical Therapy Referral; Future    Chronic pain of right knee    - Physical Therapy Referral; Future    Bronchiectasis with acute exacerbation (H)  Stable with no current infection     Chronic congestion of paranasal sinus  Will have her recheck with ENT     Seborrheic keratosis    - DESTRUCT BENIGN LESION, UP TO 14             BMI:   Estimated body mass index is 31.56 kg/m  as calculated from the following:    Height as of 8/23/21: 1.683 m (5' 6.25\").    Weight as of 10/26/21: 89.4 kg (197 lb).       Patient Instructions   Try yogurt daily for anti yeast option     Set up to see Dr Velázquez again for hearing and sinuses     Set up PT           Return in about 3 months (around 6/23/2022) for using an in person visit, with me.    Xiao Dasilva MD  [facility]    Dhaval Vidal is a 76 year old who presents for the following health issues     History of Present Illness       Reason for visit:  Question about yeast issues, strange smell in nose/sinus, brain fog from chronic sinusitis? remove spot on face??  Symptom onset:  More than a month  Symptoms include:  Smell comes and goes, brain fog when feeling sinus pressure which comes and goes.  Symptom intensity:  Moderate  Symptom progression:  Staying the same  Had these symptoms before:  Yes  Has tried/received treatment for these symptoms:  Yes  Previous treatment was successful:  No  What makes it worse:  No  What makes it better:  Salt water sinus spray and allerflo    She eats 2-3 servings of fruits and vegetables daily.She consumes 1 sweetened beverage(s) daily.She exercises with enough effort to increase her heart rate 30 to 60 minutes per day.  She exercises with enough effort to increase her heart rate 6 days per week.   She is taking medications regularly.     Derm spot on left side of face would like frozen off again        Chronic sinus issues with " bronchiectasis  Has seen ENT in the past     She has intermittant sinus infections but chronic sinus congestion with an intermittant bad odor to discharge she uses flonase     She is concerned she has yeast in her sinuses   She also feels like the chronic congestion causes a brain fog         Chronic upper back and right knee pain   This is ongoing   Would like to refresh with PT to be sure she is doing the right exercises     Review of Systems   Constitutional, HEENT, cardiovascular, pulmonary, gi and gu systems are negative, except as otherwise noted.      Objective    /74   Pulse 78   Temp 97.8  F (36.6  C) (Tympanic)   SpO2 98%   There is no height or weight on file to calculate BMI.  Physical Exam   GENERAL APPEARANCE: healthy, alert and no distress  HENT: ear canals and TM's normal and nose and mouth without ulcers or lesions  RESP: lungs clear to auscultation - no rales, rhonchi or wheezes  CV: regular rates and rhythm, normal S1 S2, no S3 or S4 and no murmur, click or rub  SKIN: allegra K left cheek   Cryo used in freeze thaw x2     PSYCH: mentation appears normal and affect normal/bright

## 2022-03-23 NOTE — NURSING NOTE
"Chief Complaint   Patient presents with     Sinus Problem     /74   Pulse 78   Temp 97.8  F (36.6  C) (Tympanic)   SpO2 98%  Estimated body mass index is 31.56 kg/m  as calculated from the following:    Height as of 8/23/21: 1.683 m (5' 6.25\").    Weight as of 10/26/21: 89.4 kg (197 lb).  Patient presents to the clinic using No DME      Health Maintenance that is potentially due pending provider review:    Health Maintenance Due   Topic Date Due     ANNUAL REVIEW OF HM ORDERS  Never done     ZOSTER IMMUNIZATION (3 of 3) 11/24/2020     ASTHMA ACTION PLAN  02/24/2021     COVID-19 Vaccine (3 - Booster for Pfizer series) 07/26/2021     ASTHMA CONTROL TEST  02/03/2022        Possibly completing today per provider review.        "

## 2022-04-25 ENCOUNTER — HOSPITAL ENCOUNTER (OUTPATIENT)
Dept: PHYSICAL THERAPY | Facility: CLINIC | Age: 77
Setting detail: THERAPIES SERIES
Discharge: HOME | End: 2022-04-25
Attending: FAMILY MEDICINE
Payer: MEDICARE

## 2022-04-25 DIAGNOSIS — G89.29 CHRONIC PAIN OF RIGHT KNEE: ICD-10-CM

## 2022-04-25 DIAGNOSIS — G89.29 CHRONIC UPPER BACK PAIN: ICD-10-CM

## 2022-04-25 DIAGNOSIS — M25.561 CHRONIC PAIN OF RIGHT KNEE: ICD-10-CM

## 2022-04-25 DIAGNOSIS — M54.9 CHRONIC UPPER BACK PAIN: ICD-10-CM

## 2022-04-25 PROCEDURE — 97162 PT EVAL MOD COMPLEX 30 MIN: CPT | Mod: GP | Performed by: PHYSICAL THERAPIST

## 2022-04-25 PROCEDURE — 97110 THERAPEUTIC EXERCISES: CPT | Mod: GP | Performed by: PHYSICAL THERAPIST

## 2022-04-25 NOTE — PROGRESS NOTES
[facility]    OUTPATIENT PHYSICAL THERAPY ORTHOPEDIC EVALUATION  PLAN OF TREATMENT FOR OUTPATIENT REHABILITATION  (COMPLETE FOR INITIAL CLAIMS ONLY)  Patient's Last Name, First Name, M.I.  YOB: 1945  DarianRuba VIEIRA    Provider s Name:  [facility]   Medical Record No.  [id number]   Start of Care Date:  04/25/22   Onset Date:  01/25/22   Type:     _X__PT   ___OT   ___SLP Medical Diagnosis:  chronic upper back pain; chronic pain of right knee     PT Diagnosis:  chronic thoracic and right knee pain   Visits from SOC:  1      _________________________________________________________________________________  Plan of Treatment/Functional Goals:  ADL retraining, balance training, gait training, joint mobilization, manual therapy, motor coordination training, neuromuscular re-education, ROM, strengthening, stretching     Cryotherapy     Goals     Goal Description: Patient will be able to carry 6 month old grandbaby without upper back soreness.  Target Date: 07/18/22       Goal Description: Patient will have >=4+/5 danni low trap strength to stabilize the thoracic back with lifting.  Target Date: 06/20/22       Goal Description: Patient will be able to perform sit <> stand from low surface without right knee symptoms.  Target Date: 07/18/22                                                           Therapy Frequency:  other (see comments)  Predicted Duration of Therapy Intervention:  1x every 3 weeks up to 12 weeks    Myrna Nascimento, PT                 I CERTIFY THE NEED FOR THESE SERVICES FURNISHED UNDER        THIS PLAN OF TREATMENT AND WHILE UNDER MY CARE     (Physician co-signature of this document indicates review and certification of the therapy plan).                       Certification Date From:  04/25/22   Certification Date To:  07/18/22    Referring Provider:  Xiao  MD Moncho    Initial Assessment        See Epic Evaluation Start of Care Date: 04/25/22

## 2022-04-25 NOTE — PROGRESS NOTES
04/25/22 1300   General Information   Type of Visit Initial OP Ortho PT Evaluation   Start of Care Date 04/25/22   Referring Physician Xiao Ivan MD   Patient/Family Goals Statement to strengthen upper back; modify knee exercises   Orders Evaluate and Treat   Date of Order 04/25/22   Certification Required? Yes   Medical Diagnosis chronic upper back pain; chronic pain of right knee   Surgical/Medical history reviewed Yes   Body Part(s)   Body Part(s) Cervical Spine;Knee   Presentation and Etiology   Pertinent history of current problem (include personal factors and/or comorbidities that impact the POC) Pt reports: with new 6 month old grand baby she is experiencing mid back muscle tightness.  Feels like she needs strength back there.  Regarding the right knee - the exercises really helped, strengthening the muscles worked with PT.  Medial side feels out of place, like its twised.   Impairments H. Impaired gait;K. Numbness   Functional Limitations perform activities of daily living;perform required work activities;perform desired leisure / sports activities   Symptom Location mid thoracic back; right > left  right medial knee   How/Where did it occur From Degenerative Joint Disease;With repetition/overuse;While lifting;At home   Onset date of current episode/exacerbation 01/25/22   Chronicity Chronic   Pain rating (0-10 point scale) Best (/10);Worst (/10)   Best (/10) 0   Worst (/10) 7   Pain quality B. Dull;D. Burning   Frequency of pain/symptoms B. Intermittent   Pain/symptoms are: The same all the time   Pain/symptoms exacerbated by C. Lifting   Pain/symptoms eased by C. Rest   Progression of symptoms since onset: Improved   Current / Previous Interventions   Diagnostic Tests: X-ray   X-ray Results Results   X-ray results 9/31/21 medial joint space narrowing   Prior Level of Function   Prior Level of Function-Mobility independent   Prior Level of Function-ADLs independent   Current Level of Function  "  Current Community Support Family/friend caregiver   Patient role/employment history A. Employed   Fall Risk Screen   Fall screen completed by PT   Have you fallen 2 or more times in the past year? No   Have you fallen and had an injury in the past year? No   Is patient a fall risk? No   Abuse Screen (yes response referral indicated)   Feels Unsafe at Home or Work/School no   Feels Threatened by Someone no   Does Anyone Try to Keep You From Having Contact with Others or Doing Things Outside Your Home? no   Physical Signs of Abuse Present no   Knee Objective Findings   Gait/Locomotion compensated trendelenburg left   Side (if bilateral, select both right and left) Left   Left Knee Extension AROM 0 deg   Left Knee Extension PROM 120 deg   Left Knee Flexion Strength 4/5   Left Knee Extension Strength 4/5   Left Hip Abduction Strength 3+/5   Left Quad Set Strength fair   Cervical Spine   Thoracic Extension ROM to neutral pain free   Cervical/Thoracic/Shoulder ROM Comments thoracic rotation=15% danni   Shoulder ER (C5, C6) Strength right=4/5; left=4+/5   Shoulder IR (C5, C6) Strength danni=4/5   Shoulder/Wrist/Hand Strength Comments mid trap=4/5 danni; low trap=3+/5 danni with upper trap wanting to compensate   Pectoralis Minor Flexibility 1\" elevation off table   Planned Therapy Interventions   Planned Therapy Interventions ADL retraining;balance training;gait training;joint mobilization;manual therapy;motor coordination training;neuromuscular re-education;ROM;strengthening;stretching   Planned Modality Interventions   Planned Modality Interventions Cryotherapy   Clinical Impression   Criteria for Skilled Therapeutic Interventions Met yes, treatment indicated   PT Diagnosis chronic thoracic and right knee pain   Influenced by the following impairments pain; weakness; ROM loss; impaired gait   Functional limitations due to impairments pain and difficulty sleeping, prolonged standing, lifting   Clinical Presentation " Stable/Uncomplicated   Clinical Presentation Rationale (+) motivation; overall health  (-) chronic nature; age; multiple body parts   Clinical Decision Making (Complexity) Moderate complexity   Therapy Frequency other (see comments)   Predicted Duration of Therapy Intervention (days/wks) 1x every 3 weeks up to 12 weeks   Risk & Benefits of therapy have been explained Yes   Patient, Family & other staff in agreement with plan of care Yes   Clinical Impression Comments Marcy arrives today with chronic thoracic pain and right medial knee pain; she will benefit from skilled PT to address the above impairments and functional limitations.   Education Assessment   Preferred Learning Style Listening;Demonstration;Pictures/video   Barriers to Learning No barriers   ORTHO GOALS   PT Ortho Eval Goals 1;2;3   Ortho Goal 1   Goal Description Patient will be able to carry 6 month old grandbaby without upper back soreness.   Target Date 07/18/22   Ortho Goal 2   Goal Description Patient will have >=4+/5 danni low trap strength to stabilize the thoracic back with lifting.   Target Date 06/20/22   Ortho Goal 3   Goal Description Patient will be able to perform sit <> stand from low surface without right knee symptoms.   Target Date 07/18/22   Total Evaluation Time   PT Eval, Moderate Complexity Minutes (21230) 25   Therapy Certification   Certification date from 04/25/22   Certification date to 07/18/22   Medical Diagnosis chronic upper back pain; chronic pain of right knee     Myrna Nascimento, PT, DTP, [facility]  Doctor of Physical Therapy #[id number]  [facility]  [phone]  [email or web address]

## 2022-05-09 ENCOUNTER — TELEPHONE (OUTPATIENT)
Dept: FAMILY MEDICINE | Facility: CLINIC | Age: 77
End: 2022-05-09
Payer: MEDICARE

## 2022-05-09 NOTE — TELEPHONE ENCOUNTER
Reason for Call:  Other    Detailed comments: Pt calling because her  tested positive for covid this morning. She is not showing any symptoms other than her lungs feeling a little tight. She is just concerned because she has asthma and is wondering if there is anything she can do doing or taking in order to stay healthy. Please advise.    Phone Number Patient can be reached at: Cell number on file:    Telephone Information:   Mobile [phone]       Best Time: anytime    Can we leave a detailed message on this number? YES    Call taken on 5/9/2022 at 4:43 PM by Amarilis Wall

## 2022-05-09 NOTE — TELEPHONE ENCOUNTER
"Spouse symptomatic,  pos for COVID with home test this AM.   Pt has Dx asthma. States neg home test this AM. States other than \"lungs feeling a little tight\" no other sx. Has albuterol inhaler.  Advise to continue to monitor sx closely, repeat home COVID test daily during incubation period. Maintain infection control precautions. May request VV through MyChart or clinic to address antiviral tx if tests pos for COVID and/or other sx management.  PRANEETH Jones RN        "

## 2022-05-12 ENCOUNTER — VIRTUAL VISIT (OUTPATIENT)
Dept: FAMILY MEDICINE | Facility: CLINIC | Age: 77
End: 2022-05-12
Payer: MEDICARE

## 2022-05-12 DIAGNOSIS — J45.30 MILD PERSISTENT ASTHMA WITHOUT COMPLICATION: ICD-10-CM

## 2022-05-12 DIAGNOSIS — J22 LOWER RESPIRATORY TRACT INFECTION DUE TO COVID-19 VIRUS: Primary | ICD-10-CM

## 2022-05-12 DIAGNOSIS — J47.1 BRONCHIECTASIS WITH ACUTE EXACERBATION (H): ICD-10-CM

## 2022-05-12 DIAGNOSIS — U07.1 LOWER RESPIRATORY TRACT INFECTION DUE TO COVID-19 VIRUS: Primary | ICD-10-CM

## 2022-05-12 PROCEDURE — 99214 OFFICE O/P EST MOD 30 MIN: CPT | Mod: 95 | Performed by: FAMILY MEDICINE

## 2022-05-12 RX ORDER — NEOMYCIN SULFATE, POLYMYXIN B SULFATE, AND DEXAMETHASONE 3.5; 10000; 1 MG/G; [USP'U]/G; MG/G
OINTMENT OPHTHALMIC PRN
COMMUNITY
Start: 2022-02-14

## 2022-05-12 NOTE — PROGRESS NOTES
"Marcy is a 76 year old who is being evaluated via a billable video visit.      How would you like to obtain your AVS? MyChart  If the video visit is dropped, the invitation should be resent by: Send to e-mail at: [email or web address]  Will anyone else be joining your video visit? No    Video Start Time: 12:12 PM    Assessment & Plan         Lower respiratory tract infection due to COVID-19 virus  She has risk factors for severe COVID infection - age/ BMI 31/ persistent asthma/ bronchiectasis.  We discussed side effects of Paxlovid.  Her GFR is normal.  She would like to pursue treatment.  I checked her routine meds with drug interaction program and no major concerns - she takes lorazepam PRN / pseudoephedrine / guaifenesin / dextromethorphan.  If her lung symptoms flare-up she will take azithromycin.    I advised she hold azithromycin for now and start on Paxlovid.  Call if any shortness of breath.    - nirmatrelvir and ritonavir (PAXLOVID) therapy pack  Dispense: 30 each; Refill: 0      Mild Persistent asthma/ Bronchiectasis - Continue routine inhalers and medicines.               BMI:   Estimated body mass index is 31.56 kg/m  as calculated from the following:    Height as of 8/23/21: 1.683 m (5' 6.25\").    Weight as of 10/26/21: 89.4 kg (197 lb).           No follow-ups on file.    Mary Lou Lowry MD  [facility]   Marcy is a 76 year old who presents for the following health issues mild persistent asthma / bronchiectasis/ obesity.    HPI  Her  developed COVID on 5/9/2022.  Pt tested positive with home COVID testing this am.  She has sore throat / lungs feel tight/ coughing.  Has low grade fever.  Has slight muscle aches.  Feels fatigued.  No loss smell or taste.  No diarrhea.    She has h/o persistent asthma/ bronchiectasis and takes inhalers / OTC meds.  She will take azithromycin if her lung symptoms worsen and has rx at home.    Patient Active " Problem List   Diagnosis     Anxiety     CARDIOVASCULAR SCREENING; LDL GOAL LESS THAN 160     GERD (gastroesophageal reflux disease)     Advanced directives, counseling/discussion     Polyp of colon, adenomatous     Senile nuclear sclerosis     Recurrent UTI     Nonallergic rhinitis     Diagnostic skin and sensitization tests (aka ALLERGENS)     Mild persistent asthma without complication     Bronchiectasis with acute exacerbation (H)     Strain of lumbar region     Chronic congestion of paranasal sinus     Chronic pain of right knee     Chronic upper back pain              Review of Systems         Objective           Vitals:  No vitals were obtained today due to virtual visit.    Physical Exam   GENERAL: Healthy, alert and no distress  EYES: Eyes grossly normal to inspection.  No discharge or erythema, or obvious scleral/conjunctival abnormalities.  RESP: No audible wheeze, cough, or visible cyanosis.  No visible retractions or increased work of breathing.    SKIN: Visible skin clear. No significant rash, abnormal pigmentation or lesions.  NEURO: Cranial nerves grossly intact.  Mentation and speech appropriate for age.  PSYCH: Mentation appears normal, affect normal/bright, judgement and insight intact, normal speech and appearance well-groomed.    GFR Estimate   Date Value Ref Range Status   08/10/2021 78 >60 mL/min/1.73m2 Final     Comment:     As of July 11, 2021, eGFR is calculated by the CKD-EPI creatinine equation, without race adjustment. eGFR can be influenced by muscle mass, exercise, and diet. The reported eGFR is an estimation only and is only applicable if the renal function is stable.   07/27/2017 89 >60 mL/min/1.7m2 Final     Comment:     Non  GFR Calc   01/04/2016 >90  Non  GFR Calc   >60 mL/min/1.7m2 Final   12/15/2014 86 >60 mL/min/1.7m2 Final     Comment:     Non  GFR Calc     GFR Estimate If Black   Date Value Ref Range Status   07/27/2017  >90   GFR Calc   >60 mL/min/1.7m2 Final   01/04/2016 >90   GFR Calc   >60 mL/min/1.7m2 Final   12/15/2014 >90   GFR Calc   >60 mL/min/1.7m2 Final                 Video-Visit Details    Type of service:  Video Visit    Video End Time:12:30 PM    Originating Location (pt. Location): Home    Distant Location (provider location):  [facility]     Platform used for Video Visit: Rashad

## 2022-06-06 DIAGNOSIS — J47.1 BRONCHIECTASIS WITH ACUTE EXACERBATION (H): ICD-10-CM

## 2022-06-06 NOTE — TELEPHONE ENCOUNTER
Pt has called and is out of this medication.  Phar is [facility].    Mitra Medeiros  [facility]

## 2022-06-07 RX ORDER — AZITHROMYCIN 250 MG/1
TABLET, FILM COATED ORAL
Qty: 6 TABLET | Refills: 10 | Status: SHIPPED | OUTPATIENT
Start: 2022-06-07 | End: 2023-06-07

## 2022-06-14 ENCOUNTER — HOSPITAL ENCOUNTER (OUTPATIENT)
Dept: PHYSICAL THERAPY | Facility: CLINIC | Age: 77
Setting detail: THERAPIES SERIES
Discharge: HOME | End: 2022-06-14
Attending: FAMILY MEDICINE
Payer: MEDICARE

## 2022-06-14 PROCEDURE — 97110 THERAPEUTIC EXERCISES: CPT | Mod: GP | Performed by: PHYSICAL THERAPIST

## 2022-06-14 PROCEDURE — 97140 MANUAL THERAPY 1/> REGIONS: CPT | Mod: GP | Performed by: PHYSICAL THERAPIST

## 2022-08-04 NOTE — PROGRESS NOTES
[facility]    Outpatient Physical Therapy Progress Note  Patient: uRba Farmer  : 1945    Beginning/End Dates of Reporting Period:  22 to 22    Referring Provider: Dr. Dasilva    Therapy Diagnosis: LBP and chronic right knee pain     Client Self Report: Back is feeling good, but feeling right medial knee pain descending stairs.  Feels the knee is caving in / bucking.    Mid morning it improves.    Objective Measurements:  Objective Measure: low trap strength     Objective Measure: straight leg raise reps  Details: 30               Goals:  Goal Identifier     Goal Description Patient will be able to carry 6 month old grandbaby without upper back soreness.   Target Date 22   Date Met      Progress (detail required for progress note):       Goal Identifier     Goal Description Patient will have >=4+/5 danni low trap strength to stabilize the thoracic back with lifting.   Target Date 22   Date Met      Progress (detail required for progress note):       Goal Identifier     Goal Description Patient will be able to perform sit <> stand from low surface without right knee symptoms.   Target Date 22   Date Met      Progress (detail required for progress note):               Plan:  Chart open in case of flare up      Myrna Nascimento, PT, DTP, SCS  Doctor of Physical Therapy #[id number]  [facility]  [phone]  [email or web address]

## 2022-08-10 ENCOUNTER — TELEPHONE (OUTPATIENT)
Dept: FAMILY MEDICINE | Facility: CLINIC | Age: 77
End: 2022-08-10

## 2022-08-10 DIAGNOSIS — L98.9 SKIN LESION: Primary | ICD-10-CM

## 2022-08-10 NOTE — TELEPHONE ENCOUNTER
"  Order/Referral Request    Who is requesting: Pt requesting for self    Orders being requested: Referral for dermatologist    Reason service is needed/diagnosis: would like a dermatologist to look at \"brown spots\" on pt's back that Dr. Dasilva has looked at and removed a couple times, but pt stated they keep coming back.    When are orders needed by: as soon as possible    Has this been discussed with Provider: Yes    Does patient have a preference on a Group/Provider/Facility? [place]      Where to send orders: N/A    Could we send this information to you in [employer] or would you prefer to receive a phone call?:   Patient would prefer a phone call  Okay to leave a detailed message?: Yes at Cell number on file:    Telephone Information:   Mobile [phone]     "

## 2022-08-18 ENCOUNTER — HOSPITAL ENCOUNTER (OUTPATIENT)
Dept: PHYSICAL THERAPY | Facility: CLINIC | Age: 77
Setting detail: THERAPIES SERIES
Discharge: HOME | End: 2022-08-18
Attending: FAMILY MEDICINE
Payer: MEDICARE

## 2022-08-18 PROCEDURE — 97140 MANUAL THERAPY 1/> REGIONS: CPT | Mod: GP | Performed by: PHYSICAL THERAPIST

## 2022-08-18 PROCEDURE — 97110 THERAPEUTIC EXERCISES: CPT | Mod: GP | Performed by: PHYSICAL THERAPIST

## 2022-08-18 NOTE — PROGRESS NOTES
[facility]    Outpatient Physical Therapy Progress Note  Patient: Ruba Farmer  : 1945    Beginning/End Dates of Reporting Period:  22 to 22    Referring Provider: Dr. Dasilva    Therapy Diagnosis: back and right knee pain     Client Self Report: Pt reports: wakes us with right > left hand numbness, mid back seems to be the source of pain.  Knees are most stiff with prolonged standing / sitting    Objective Measurements:  Objective Measure: low trap strength  Details:                      Goals:  Goal Identifier     Goal Description Patient will be able to carry 6 month old grandbaby without upper back soreness.   Target Date 22   Date Met  22   Progress (detail required for progress note):       Goal Identifier     Goal Description Patient will have >=4+/5 danni low trap strength to stabilize the thoracic back with lifting.   Target Date 10/27/22   Date Met      Progress (detail required for progress note): 4/5     Goal Identifier     Goal Description Patient will be able to perform sit <> stand from low surface without right knee symptoms.   Target Date 22   Date Met  22   Progress (detail required for progress note):             Plan:  Changes to therapy plan of care: 1x/week for 4 weeks, every other week for up to 8 weeks    Discharge:  No      RECERTIFICATION    Ruba Farmer  1945    Session Number: 3 since start of care.      Diagnosis: back and right knee pain  Onset Date: 22  Start of Care Date: 22    Reasons for Continuing Treatment:   To progress mid back mobility, postural strength, and right knee strength    Frequency/Duration  1 times per week for 4 weeks; 1x every other week up to 8 weeks for a total of 8 visits.    Recertification Period  22 - 10/7/22    Physician  Signature:    Date:    X_______________________________________________________    Physician Name: Xiao Dasilva MD    I certify the need for these services furnished under this plan of treatment and while under my care. Physician co-signature of this document indicates review and certification of the therapy plan.  This signature may be written on paper, or electronically signed within EPIC.

## 2022-09-01 ENCOUNTER — HOSPITAL ENCOUNTER (OUTPATIENT)
Dept: PHYSICAL THERAPY | Facility: CLINIC | Age: 77
Setting detail: THERAPIES SERIES
Discharge: HOME | End: 2022-09-01
Attending: FAMILY MEDICINE
Payer: MEDICARE

## 2022-09-01 PROCEDURE — 97110 THERAPEUTIC EXERCISES: CPT | Mod: GP | Performed by: PHYSICAL MEDICINE & REHABILITATION

## 2022-09-14 ENCOUNTER — HOSPITAL ENCOUNTER (OUTPATIENT)
Dept: PHYSICAL THERAPY | Facility: CLINIC | Age: 77
Setting detail: THERAPIES SERIES
Discharge: HOME | End: 2022-09-14
Attending: FAMILY MEDICINE
Payer: MEDICARE

## 2022-09-14 PROCEDURE — 97110 THERAPEUTIC EXERCISES: CPT | Mod: GP | Performed by: PHYSICAL MEDICINE & REHABILITATION

## 2022-09-14 PROCEDURE — 97140 MANUAL THERAPY 1/> REGIONS: CPT | Mod: GP | Performed by: PHYSICAL MEDICINE & REHABILITATION

## 2022-09-28 ENCOUNTER — HOSPITAL ENCOUNTER (OUTPATIENT)
Dept: PHYSICAL THERAPY | Facility: CLINIC | Age: 77
Setting detail: THERAPIES SERIES
Discharge: HOME | End: 2022-09-28
Attending: FAMILY MEDICINE
Payer: MEDICARE

## 2022-09-28 ENCOUNTER — IMMUNIZATION (OUTPATIENT)
Dept: FAMILY MEDICINE | Facility: CLINIC | Age: 77
End: 2022-09-28
Payer: MEDICARE

## 2022-09-28 DIAGNOSIS — Z23 HIGH PRIORITY FOR 2019-NCOV VACCINE: Primary | ICD-10-CM

## 2022-09-28 DIAGNOSIS — Z23 NEED FOR PROPHYLACTIC VACCINATION AND INOCULATION AGAINST INFLUENZA: ICD-10-CM

## 2022-09-28 PROCEDURE — G0008 ADMIN INFLUENZA VIRUS VAC: HCPCS

## 2022-09-28 PROCEDURE — 91312 COVID-19,PF,PFIZER BOOSTER BIVALENT: CPT

## 2022-09-28 PROCEDURE — 90662 IIV NO PRSV INCREASED AG IM: CPT

## 2022-09-28 PROCEDURE — 97110 THERAPEUTIC EXERCISES: CPT | Mod: GP | Performed by: PHYSICAL MEDICINE & REHABILITATION

## 2022-09-28 PROCEDURE — 99207 PR NO CHARGE LOS: CPT

## 2022-09-28 PROCEDURE — 0124A COVID-19,PF,PFIZER BOOSTER BIVALENT: CPT

## 2022-09-28 NOTE — PROGRESS NOTES
[facility]    Outpatient Physical Therapy Discharge Note  Patient: Ruba Farmer  : 1945    Beginning/End Dates of Reporting Period:  22 to 22    Referring Provider: Dr. Dasilva    Therapy Diagnosis: LBP and chronic right knee pain     Client Self Report: Pt reports she did buy R wrist brace and seems to be helping with tingling in hand, decreasing frequency of sx. Shared jt mobs to thoracic spine last visit improved radicular sx in back. Shared she has been working extensively through list of exercises, and able to recite/demonstrate good recall.    Objective Measurements:  Objective Measure: deep neck flexor strength  Details: good  Objective Measure: cervical mobility  Details: hypomobility throughout T spine  Objective Measure: low trap strength  Details: 4+/5     Goals:  Goal Identifier     Goal Description Patient will be able to carry 6 month old grandbaby without upper back soreness.   Target Date 22   Date Met  22   Progress (detail required for progress note):       Goal Identifier     Goal Description Patient will have >=4+/5 danni low trap strength to stabilize the thoracic back with lifting.   Target Date 10/27/22   Date Met  22   Progress (detail required for progress note):       Goal Identifier     Goal Description Patient will be able to perform sit <> stand from low surface without right knee symptoms.   Target Date 22   Date Met  22   Progress (detail required for progress note):       Progress towards goals: Pt has attended 6 PT sessions over the last 5 months, and has achieved 3/3 short term and long term goals. Pt's mobility, strength, pain, radicular sx, and posture have improved since starting skilled PT, and wIth continued HEP compliance, pt can further improve these impairments. PT and pt discussed and agreed upon D/C from PT at this time as  she has met all goals and is independent with HEP.    Plan:  Discharge from therapy.    Discharge:    Reason for Discharge: Patient has met all goals.    Equipment Issued: therabands    Discharge Plan: Patient to continue home program.    Please contact me with any questions or concerns.    Thank you for your referral,     Jeannie Billingsley PT, DPT  Physical Therapist  [address] [address] [phone]

## 2022-12-05 ENCOUNTER — TELEPHONE (OUTPATIENT)
Dept: DERMATOLOGY | Facility: CLINIC | Age: 77
End: 2022-12-05

## 2022-12-05 ENCOUNTER — OFFICE VISIT (OUTPATIENT)
Dept: DERMATOLOGY | Facility: CLINIC | Age: 77
End: 2022-12-05
Attending: FAMILY MEDICINE
Payer: MEDICARE

## 2022-12-05 DIAGNOSIS — L81.4 LENTIGO: ICD-10-CM

## 2022-12-05 DIAGNOSIS — L80 VITILIGO: ICD-10-CM

## 2022-12-05 DIAGNOSIS — L57.0 ACTINIC KERATOSIS: ICD-10-CM

## 2022-12-05 DIAGNOSIS — L85.9 HYPERKERATOSIS: Primary | ICD-10-CM

## 2022-12-05 DIAGNOSIS — L82.0 INFLAMED SEBORRHEIC KERATOSIS: ICD-10-CM

## 2022-12-05 DIAGNOSIS — D22.9 NEVUS: ICD-10-CM

## 2022-12-05 DIAGNOSIS — L82.1 SEBORRHEIC KERATOSIS: ICD-10-CM

## 2022-12-05 DIAGNOSIS — D18.01 ANGIOMA OF SKIN: ICD-10-CM

## 2022-12-05 LAB — TSH SERPL DL<=0.005 MIU/L-ACNC: 2.71 UIU/ML (ref 0.3–4.2)

## 2022-12-05 PROCEDURE — 36415 COLL VENOUS BLD VENIPUNCTURE: CPT | Performed by: PHYSICIAN ASSISTANT

## 2022-12-05 PROCEDURE — 17110 DESTRUCTION B9 LES UP TO 14: CPT | Performed by: PHYSICIAN ASSISTANT

## 2022-12-05 PROCEDURE — 17000 DESTRUCT PREMALG LESION: CPT | Mod: 59 | Performed by: PHYSICIAN ASSISTANT

## 2022-12-05 PROCEDURE — 84443 ASSAY THYROID STIM HORMONE: CPT | Performed by: PHYSICIAN ASSISTANT

## 2022-12-05 PROCEDURE — 99214 OFFICE O/P EST MOD 30 MIN: CPT | Mod: 25 | Performed by: PHYSICIAN ASSISTANT

## 2022-12-05 RX ORDER — RUXOLITINIB 15 MG/G
1 CREAM TOPICAL 2 TIMES DAILY
Qty: 60 G | Refills: 5 | Status: SHIPPED | OUTPATIENT
Start: 2022-12-05 | End: 2023-09-08

## 2022-12-05 RX ORDER — UREA 40 %
CREAM (GRAM) TOPICAL
Qty: 227 G | Refills: 11 | Status: SHIPPED | OUTPATIENT
Start: 2022-12-05 | End: 2024-04-03

## 2022-12-05 ASSESSMENT — PAIN SCALES - GENERAL: PAINLEVEL: NO PAIN (0)

## 2022-12-05 NOTE — LETTER
12/5/2022         RE: Ruba Farmer  [id number] [facility] [id number]        Dear Colleague,    Thank you for referring your patient, Ruba Farmer, to the [facility]. Please see a copy of my visit note below.    HPI:   Chief complaints: Ruba Farmer is a pleasant 77 year old female who presents for Full skin cancer screening to rule out skin cancer   Last Skin Exam: n/a      1st Baseline: yes  Personal HX of Skin Cancer: no   Personal HX of Malignant Melanoma: no   Family HX of Skin Cancer / Malignant Melanoma: no  Personal HX of Atypical Moles:   no  Risk factors: history of sun exposure and burns  New / Changing lesions:yes scaly spot on the nose. Persistent itching on the back. Vitiligo and dry cracked skin on the feet  Social History:   On review of systems, there are no further skin complaints, patient is feeling otherwise well.   ROS of the following were done and are negative: Constitutional, Eyes, Ears, Nose,   Mouth, Throat, Cardiovascular, Respiratory, GI, Genitourinary, Musculoskeletal,   Psychiatric, Endocrine, Allergic/Immunologic.    PHYSICAL EXAM:   There were no vitals taken for this visit.  Skin exam performed as follows: Type 2 skin. Mood appropriate  Alert and Oriented X 3. Well developed, well nourished in no distress.  General appearance: Normal  Head including face: Normal  Eyes: conjunctiva and lids: Normal  Mouth: Lips, teeth, gums: Normal  Neck: Normal  Chest-breast/axillae: Normal  Back: Normal  Spleen and liver: Normal  Cardiovascular: Exam of peripheral vascular system by observation for swelling, varicosities, edema: Normal  Genitalia: groin, buttocks: Normal  Extremities: digits/nails (clubbing): Normal  Eccrine and Apocrine glands: Normal  Right upper extremity: Normal  Left upper extremity: Normal  Right lower extremity: Normal  Left lower extremity: Normal  Skin: Scalp and body hair: See below    Pt deferred exam of breasts, groin,  buttocks: No    Other physical findings:  1. Multiple pigmented macules on extremities and trunk  2. Multiple pigmented macules on face, trunk and extremities  3. Multiple vascular papules on trunk, arms and legs  4. Multiple scattered keratotic plaques  5. Pink gritty papule on the left nasal side wall x 1  6. Depigmented plaques on the back, arms, legs  7. Thickened skin on the feet   8. Inflamed keratotic papule on the back       Except as noted above, no other signs of skin cancer or melanoma.     ASSESSMENT/PLAN:   Benign Full skin cancer screening today. . Patient with history of none  Advised on monthly self exams and 1 year  Patient Education: Appropriate brochures given.    1. Multiple benign appearing melanocytic nevi on arms, legs and trunk. Discussed ABCDEs of melanoma and sunscreen.   2. Multiple lentigos on arms, legs and trunk. Advised benign, no treatment needed.  3. Multiple scattered angiomas. Advised benign, no treatment needed.   4. Seborrheic keratosis on arms, legs and trunk. Advised benign, no treatment needed.  5. 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 Actinic keratosis on the left nasal side wall x 1. As precancerous, cryosurgery performed. Advised on blistering and post-op care. Advised if not resolved in 1-2 months to return for evaluation  7. Vitiligo - she has had this for the past 5-luis years. Discussed Opezlura and she would like to try this - discussed not to use on more than 10% tbsa. Check TSH today.   8. Hyperkeratosis on bilateral feet - start 40% urea cream at bedtime  9. Inflamed seborrheic keratosis on the right back x 1. As physically tender cryosurgery performed. Advised on post op care.               Follow-up: yearly/PRN sooner    1.) Patient was asked about new and changing moles. YES  2.) Patient received a complete physical skin examination: YES  3.) Patient was counseled to perform a monthly self skin  examination: YES  Scribed By: Evelyne Hooper, MS, PARaulC          Again, thank you for allowing me to participate in the care of your patient.        Sincerely,        Evelyne Hooper PA-C

## 2022-12-05 NOTE — TELEPHONE ENCOUNTER
Prior Authorization Retail Medication Request    Medication/Dose: Urea 40% cream  ICD code (if different than what is on RX):    Previously Tried and Failed:    Rationale:     Insurance Name: Medicare Part B  Insurance ID: [id number]  [phone]      Pharmacy Information (if different than what is on RX)  Name:   Phone:

## 2022-12-05 NOTE — PROGRESS NOTES
HPI:   Chief complaints: Ruba Farmer is a pleasant 77 year old female who presents for Full skin cancer screening to rule out skin cancer   Last Skin Exam: n/a      1st Baseline: yes  Personal HX of Skin Cancer: no   Personal HX of Malignant Melanoma: no   Family HX of Skin Cancer / Malignant Melanoma: no  Personal HX of Atypical Moles:   no  Risk factors: history of sun exposure and burns  New / Changing lesions:yes scaly spot on the nose. Persistent itching on the back. Vitiligo and dry cracked skin on the feet  Social History:   On review of systems, there are no further skin complaints, patient is feeling otherwise well.   ROS of the following were done and are negative: Constitutional, Eyes, Ears, Nose,   Mouth, Throat, Cardiovascular, Respiratory, GI, Genitourinary, Musculoskeletal,   Psychiatric, Endocrine, Allergic/Immunologic.    PHYSICAL EXAM:   There were no vitals taken for this visit.  Skin exam performed as follows: Type 2 skin. Mood appropriate  Alert and Oriented X 3. Well developed, well nourished in no distress.  General appearance: Normal  Head including face: Normal  Eyes: conjunctiva and lids: Normal  Mouth: Lips, teeth, gums: Normal  Neck: Normal  Chest-breast/axillae: Normal  Back: Normal  Spleen and liver: Normal  Cardiovascular: Exam of peripheral vascular system by observation for swelling, varicosities, edema: Normal  Genitalia: groin, buttocks: Normal  Extremities: digits/nails (clubbing): Normal  Eccrine and Apocrine glands: Normal  Right upper extremity: Normal  Left upper extremity: Normal  Right lower extremity: Normal  Left lower extremity: Normal  Skin: Scalp and body hair: See below    Pt deferred exam of breasts, groin, buttocks: No    Other physical findings:  1. Multiple pigmented macules on extremities and trunk  2. Multiple pigmented macules on face, trunk and extremities  3. Multiple vascular papules on trunk, arms and legs  4. Multiple scattered keratotic plaques  5. Pink  gritty papule on the left nasal side wall x 1  6. Depigmented plaques on the back, arms, legs  7. Thickened skin on the feet   8. Inflamed keratotic papule on the back       Except as noted above, no other signs of skin cancer or melanoma.     ASSESSMENT/PLAN:   Benign Full skin cancer screening today. . Patient with history of none  Advised on monthly self exams and 1 year  Patient Education: Appropriate brochures given.    1. Multiple benign appearing melanocytic nevi on arms, legs and trunk. Discussed ABCDEs of melanoma and sunscreen.   2. Multiple lentigos on arms, legs and trunk. Advised benign, no treatment needed.  3. Multiple scattered angiomas. Advised benign, no treatment needed.   4. Seborrheic keratosis on arms, legs and trunk. Advised benign, no treatment needed.  5. 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 Actinic keratosis on the left nasal side wall x 1. As precancerous, cryosurgery performed. Advised on blistering and post-op care. Advised if not resolved in 1-2 months to return for evaluation  7. Vitiligo - she has had this for the past 5-luis years. Discussed Opezlura and she would like to try this - discussed not to use on more than 10% tbsa. Check TSH today.   8. Hyperkeratosis on bilateral feet - start 40% urea cream at bedtime  9. Inflamed seborrheic keratosis on the right back x 1. As physically tender cryosurgery performed. Advised on post op care.               Follow-up: yearly/PRN sooner    1.) Patient was asked about new and changing moles. YES  2.) Patient received a complete physical skin examination: YES  3.) Patient was counseled to perform a monthly self skin examination: YES  Scribed By: Evelyne Hooper, MS, PA-C

## 2022-12-06 NOTE — TELEPHONE ENCOUNTER
PA Initiation    Medication: Urea 40% cream PA INITIATED  Insurance Company: [employer] Part D - Phone [phone] Fax [phone]  Pharmacy Filling the Rx: [facility] [place], [place] - [address] [place] [address]  Filling Pharmacy Phone: [phone]  Filling Pharmacy Fax:    Start Date: 12/6/2022    Central Prior Authorization Team   Phone: [phone]

## 2022-12-07 NOTE — TELEPHONE ENCOUNTER
PRIOR AUTHORIZATION DENIED    Medication: Urea 40% cream PA DENIED    Denial Date: 12/7/2022    Denial Rational:       Appeal Information:

## 2022-12-08 NOTE — TELEPHONE ENCOUNTER
Unable to reach patient. Message left that PA denied by Medicare and per Provider note, had discussed OTC options if not covered with pt when seen. Call if questions.  Autumn Bassett RN

## 2022-12-26 ENCOUNTER — HEALTH MAINTENANCE LETTER (OUTPATIENT)
Age: 77
End: 2022-12-26

## 2023-01-10 DIAGNOSIS — F41.9 ANXIETY: ICD-10-CM

## 2023-01-10 RX ORDER — LORAZEPAM 0.5 MG/1
0.5 TABLET ORAL EVERY 8 HOURS PRN
Qty: 20 TABLET | Refills: 0 | Status: SHIPPED | OUTPATIENT
Start: 2023-01-10 | End: 2023-09-08

## 2023-01-10 NOTE — TELEPHONE ENCOUNTER
Reason for Call:  Ativan  prescription    Detailed comments: requesting Ativan for General Anxiety. Pt was last seen with Dr. Dasilva 3/23/22. Last ordered 10/7/2020.  Pt would like to see if Dr. Dasilva would fill this for her.     Phone Number Patient can be reached at: Home number on file [phone] (home)    Best Time: Any Time      Can we leave a detailed message on this number? YES    Call taken on 1/10/2023 at 9:50 AM by Beatrice Henry

## 2023-01-24 ENCOUNTER — ANCILLARY ORDERS (OUTPATIENT)
Dept: FAMILY MEDICINE | Facility: CLINIC | Age: 78
End: 2023-01-24

## 2023-01-24 ENCOUNTER — ANCILLARY ORDERS (OUTPATIENT)
Dept: MAMMOGRAPHY | Facility: CLINIC | Age: 78
End: 2023-01-24

## 2023-01-24 DIAGNOSIS — Z12.31 VISIT FOR SCREENING MAMMOGRAM: ICD-10-CM

## 2023-02-21 ENCOUNTER — HOSPITAL ENCOUNTER (OUTPATIENT)
Dept: PHYSICAL THERAPY | Facility: CLINIC | Age: 78
Setting detail: THERAPIES SERIES
Discharge: HOME | End: 2023-02-21
Attending: FAMILY MEDICINE
Payer: MEDICARE

## 2023-02-21 PROCEDURE — 97110 THERAPEUTIC EXERCISES: CPT | Mod: GP | Performed by: PHYSICAL MEDICINE & REHABILITATION

## 2023-02-21 PROCEDURE — 97161 PT EVAL LOW COMPLEX 20 MIN: CPT | Mod: GP | Performed by: PHYSICAL MEDICINE & REHABILITATION

## 2023-02-21 NOTE — PROGRESS NOTES
"   02/21/23 1000   General Information   Type of Visit Initial OP Ortho PT Evaluation   Start of Care Date 02/21/23   Referring Physician Xiao Dasilva MD   Patient/Family Goals Statement \"stop the itch, get out of a truck easier\"   Orders Evaluate and Treat   Date of Order 02/07/23   Certification Required? Yes   Medical Diagnosis Strain of lumbar region, subsequent encounter  - Primary;     Chronic pain of right knee   Surgical/Medical history reviewed Yes   Precautions/Limitations no known precautions/limitations   General Information Comments PMHx per pt report: asthma, bladder control, shoulder surgery, hysterectomy   Body Part(s)   Body Part(s) Lumbar Spine/SI;Knee   Presentation and Etiology   Pertinent history of current problem (include personal factors and/or comorbidities that impact the POC) Pt arrived to PT today for chronic B knee pain that is worse with sit <> stands, getting out of car, walking downhill. Notes she has been working on HEP from last therapy POC but feel knees want to hyperextend or buckle, dont feel like she has the stability. Pt reports not having much back pain today, more of an itch, but went to dermatagist and told her itching in her back is coming from her neck.   Impairments A. Pain;F. Decreased strength and endurance;E. Decreased flexibility;H. Impaired gait   Functional Limitations perform activities of daily living;perform required work activities;perform desired leisure / sports activities   Symptom Location B knees; back pain   How/Where did it occur From Degenerative Joint Disease   Onset date of current episode/exacerbation 02/07/23  (date pf order, chronic condition)   Chronicity Chronic   Pain rating (0-10 point scale) Best (/10);Worst (/10)   Best (/10) 0   Worst (/10) 6   Pain quality A. Sharp   Frequency of pain/symptoms C. With activity   Pain/symptoms are: Worse during the day   Pain/symptoms exacerbated by B. Walking   Pain/symptoms eased by A. Sitting " "  Progression of symptoms since onset: Worsened   Prior Level of Function   Prior Level of Function-Mobility independent   Prior Level of Function-ADLs independent   Current Level of Function   Current Community Support Family/friend caregiver   Patient role/employment history A. Employed   Employment Comments pt volunteers   Living environment [facility]   Current equipment-Gait/Locomotion None   Fall Risk Screen   Fall screen completed by PT   Have you fallen 2 or more times in the past year? No   Have you fallen and had an injury in the past year? No   Is patient a fall risk? No   Abuse Screen (yes response referral indicated)   Feels Unsafe at Home or Work/School no   Feels Threatened by Someone no   Does Anyone Try to Keep You From Having Contact with Others or Doing Things Outside Your Home? no   Physical Signs of Abuse Present no   System Outcome Measures   Outcome Measures Low Back Pain (see Oswestry and Kehinde)   Knee Objective Findings   Gait/Locomotion increased knee valgus B, increased valgus on stairs   Foot Position In Standing unremarkable   Knee/Hip Strength Comments see lumbar section for MMT   Step Test Height 6\"   Step Test Height Control Comment increased knee valgus and hip drop   Posture rounded shoulders, forward head, increased thoracic kyphosis   Side (if bilateral, select both right and left) Right   Knee Special Test Comments negative special tests   Right Knee Extension AROM 5*   Right Knee Extension PROM 0*   Right Knee Flexion AROM 120*   Right Knee Flexion PROM 120*   Right Hamstring Flexibility limited   Right Hip Flexor Flexibility limited   Lumbar Spine/SI Objective Findings   Piriformis Flexibility limited B   Lumbar/SI Flexibility Comments hypomobility of thoracic spine   Flexion ROM fingertips to toes, with flexed knees   Extension ROM no limitation   Right Side Bending ROM tibiofemoral jt   Left Side Bending ROM tibiofemoral jt   Repeated Extension-Standing ROM neg   Repeated " Flexion-Standing ROM neg   Lumbar ROM Comment no pain   Hip Flexion (L2) Strength 5-/5 B   Hip Abduction Strength seated: 5/5 B   Hip Adduction Strength seated: 5/5 B   Knee Flexion Strength 5/5 B   Knee Extension (L3) Strength 5-/5 B   Ankle Dorsiflexion (L4) Strength 5/5 B   Great Toe Extension (L5) Strength 5/5 B   Ankle Plantar Flexion (S1) Strength 5/5 B   Lumbar/Hip/Knee/Foot Strength Comments s/l hip abd R: 4-/5;  s/l hip abd L: 4/5   SLR neg   Crossover SLR neg   Palpation notes itching along T8-9   Observation increased knee valgus with sit <> stand   Planned Therapy Interventions   Planned Therapy Interventions ADL retraining;gait training;joint mobilization;manual therapy;motor coordination training;neuromuscular re-education;ROM;strengthening;stretching;transfer training   Planned Modality Interventions   Planned Modality Interventions Cryotherapy;Electrical stimulation;Hot packs;TENS;Traction;Ultrasound   Planned Modality Interventions Comments only as needed   Clinical Impression   Criteria for Skilled Therapeutic Interventions Met yes, treatment indicated   PT Diagnosis chronic LBP and knee pain   Influenced by the following impairments decreased ROM, decreased strength, impaired gait, impaired transfers, radicular sx, pain   Functional limitations due to impairments walking, ADLs, stairs   Clinical Presentation Stable/Uncomplicated   Clinical Presentation Rationale PLOF, comorbidities, clinical judgement, JUANY, Kehinde   Clinical Decision Making (Complexity) Low complexity   Therapy Frequency 1 time/week   Predicted Duration of Therapy Intervention (days/wks) 6 weeks   Risk & Benefits of therapy have been explained Yes   Patient, Family & other staff in agreement with plan of care Yes   Clinical Impression Comments Pt is a 77 y.o. female who presented to the PT clnic today with a rehab diagnosis ofchronic LBP and knee pain as evidenced by decreased ROM, decreased strength, impaired gait, impaired  transfers, radicular sx, and pain. More specifically, signs/symptoms consistent with hip weakness and OA. Pt is appropriate in order to decrease difficulty with walking,s tairs, and ADLs.   Education Assessment   Preferred Learning Style Listening;Reading;Demonstration;Pictures/video   Barriers to Learning No barriers   ORTHO GOALS   PT Ortho Eval Goals 1;2;3;4   Ortho Goal 1   Goal Identifier 1   Goal Description Pt will report 50% reduction in itching pain of thoracic spine in order to decrease difficulty with ADLs   Target Date 03/14/23   Ortho Goal 2   Goal Identifier 2   Goal Description Pt will be able to perform sit <> stand with proper body mechanics in order to decrease difficulty with car transfers   Target Date 03/21/23   Ortho Goal 3   Goal Identifier 3   Goal Description Pt will be independent with HEP in order to self manage symptoms.   Target Date 04/07/23   Ortho Goal 4   Goal Identifier 4   Goal Description Pt will be able to demonstrate 5-/5 s/l hip abd in order to decrease difficulty with downhill amb and stair descent.   Target Date 04/07/23   Total Evaluation Time   PT Eval, Low Complexity Minutes (86493) 30   Therapy Certification   Certification date from 02/21/23   Certification date to 04/07/23   Medical Diagnosis Strain of lumbar region, subsequent encounter  - Primary;     Chronic pain of right knee       Please contact me with any questions or concerns.    Thank you for your referral,     Jeannie Billingsley, PT, DPT  Physical Therapist  [place] [id number] [place] [id number] [phone]

## 2023-02-21 NOTE — PROGRESS NOTES
[facility]    OUTPATIENT PHYSICAL THERAPY ORTHOPEDIC EVALUATION  PLAN OF TREATMENT FOR OUTPATIENT REHABILITATION  (COMPLETE FOR INITIAL CLAIMS ONLY)  Patient's Last Name, First Name, M.I.  YOB: 1945  Ruba Farmer    Provider s Name:  [facility]   Medical Record No.  [id number]   Start of Care Date:  02/21/23   Onset Date:  02/07/23 (date pf order, chronic condition)   Type:     _X__PT   ___OT   ___SLP Medical Diagnosis:  Strain of lumbar region, subsequent encounter  - Primary;     Chronic pain of right knee     PT Diagnosis:  chronic LBP and knee pain   Visits from SOC:  1      _________________________________________________________________________________  Plan of Treatment/Functional Goals:  ADL retraining, gait training, joint mobilization, manual therapy, motor coordination training, neuromuscular re-education, ROM, strengthening, stretching, transfer training     Cryotherapy, Electrical stimulation, Hot packs, TENS, Traction, Ultrasound  only as needed  Goals  Goal Identifier: 1  Goal Description: Pt will report 50% reduction in itching pain of thoracic spine in order to decrease difficulty with ADLs  Target Date: 03/14/23    Goal Identifier: 2  Goal Description: Pt will be able to perform sit <> stand with proper body mechanics in order to decrease difficulty with car transfers  Target Date: 03/21/23    Goal Identifier: 3  Goal Description: Pt will be independent with HEP in order to self manage symptoms.  Target Date: 04/07/23    Goal Identifier: 4  Goal Description: Pt will be able to demonstrate 5-/5 s/l hip abd in order to decrease difficulty with downhill amb and stair descent.  Target Date: 04/07/23      Therapy Frequency:  1 time/week  Predicted Duration of Therapy Intervention:  6 weeks    Jeannie Billingsley PT                 I CERTIFY THE NEED FOR  THESE SERVICES FURNISHED UNDER        THIS PLAN OF TREATMENT AND WHILE UNDER MY CARE     (Physician co-signature of this document indicates review and certification of the therapy plan).                       Certification Date From:  02/21/23   Certification Date To:  04/07/23    Referring Provider:  Xiao Dasilva MD    Initial Assessment        See Epic Evaluation Start of Care Date: 02/21/23

## 2023-03-01 ENCOUNTER — OFFICE VISIT (OUTPATIENT)
Dept: FAMILY MEDICINE | Facility: CLINIC | Age: 78
End: 2023-03-01
Payer: MEDICARE

## 2023-03-01 ENCOUNTER — ANCILLARY ORDERS (OUTPATIENT)
Dept: MAMMOGRAPHY | Facility: CLINIC | Age: 78
End: 2023-03-01

## 2023-03-01 ENCOUNTER — ANCILLARY PROCEDURE (OUTPATIENT)
Dept: MAMMOGRAPHY | Facility: CLINIC | Age: 78
End: 2023-03-01
Attending: FAMILY MEDICINE
Payer: MEDICARE

## 2023-03-01 ENCOUNTER — HOSPITAL ENCOUNTER (OUTPATIENT)
Dept: PHYSICAL THERAPY | Facility: CLINIC | Age: 78
Setting detail: THERAPIES SERIES
Discharge: HOME | End: 2023-03-01
Attending: FAMILY MEDICINE
Payer: MEDICARE

## 2023-03-01 VITALS
WEIGHT: 190 LBS | BODY MASS INDEX: 30.53 KG/M2 | OXYGEN SATURATION: 97 % | SYSTOLIC BLOOD PRESSURE: 130 MMHG | TEMPERATURE: 98 F | HEIGHT: 66 IN | RESPIRATION RATE: 12 BRPM | HEART RATE: 77 BPM | DIASTOLIC BLOOD PRESSURE: 80 MMHG

## 2023-03-01 DIAGNOSIS — Z12.31 VISIT FOR SCREENING MAMMOGRAM: ICD-10-CM

## 2023-03-01 DIAGNOSIS — F41.9 ANXIETY: ICD-10-CM

## 2023-03-01 DIAGNOSIS — N39.41 URGE INCONTINENCE OF URINE: ICD-10-CM

## 2023-03-01 DIAGNOSIS — K21.9 GASTROESOPHAGEAL REFLUX DISEASE WITHOUT ESOPHAGITIS: ICD-10-CM

## 2023-03-01 DIAGNOSIS — J01.00 SUBACUTE MAXILLARY SINUSITIS: ICD-10-CM

## 2023-03-01 DIAGNOSIS — Z00.00 ENCOUNTER FOR MEDICARE ANNUAL WELLNESS EXAM: Primary | ICD-10-CM

## 2023-03-01 PROBLEM — J47.1 BRONCHIECTASIS WITH ACUTE EXACERBATION (H): Status: RESOLVED | Noted: 2018-04-23 | Resolved: 2023-03-01

## 2023-03-01 PROCEDURE — 77067 SCR MAMMO BI INCL CAD: CPT | Mod: TC | Performed by: RADIOLOGY

## 2023-03-01 PROCEDURE — 97110 THERAPEUTIC EXERCISES: CPT | Mod: GP | Performed by: PHYSICAL MEDICINE & REHABILITATION

## 2023-03-01 PROCEDURE — 97140 MANUAL THERAPY 1/> REGIONS: CPT | Mod: GP | Performed by: PHYSICAL MEDICINE & REHABILITATION

## 2023-03-01 PROCEDURE — G0439 PPPS, SUBSEQ VISIT: HCPCS | Performed by: FAMILY MEDICINE

## 2023-03-01 PROCEDURE — 99214 OFFICE O/P EST MOD 30 MIN: CPT | Mod: 25 | Performed by: FAMILY MEDICINE

## 2023-03-01 PROCEDURE — 77063 BREAST TOMOSYNTHESIS BI: CPT | Mod: TC | Performed by: RADIOLOGY

## 2023-03-01 RX ORDER — DOXYCYCLINE 100 MG/1
100 CAPSULE ORAL 2 TIMES DAILY
Qty: 20 CAPSULE | Refills: 0 | Status: SHIPPED | OUTPATIENT
Start: 2023-03-01 | End: 2023-06-07

## 2023-03-01 RX ORDER — AZELASTINE 1 MG/ML
1 SPRAY, METERED NASAL 2 TIMES DAILY
Qty: 30 ML | Refills: 1 | Status: SHIPPED | OUTPATIENT
Start: 2023-03-01 | End: 2024-04-03

## 2023-03-01 ASSESSMENT — ASTHMA QUESTIONNAIRES
QUESTION_5 LAST FOUR WEEKS HOW WOULD YOU RATE YOUR ASTHMA CONTROL: COMPLETELY CONTROLLED
QUESTION_1 LAST FOUR WEEKS HOW MUCH OF THE TIME DID YOUR ASTHMA KEEP YOU FROM GETTING AS MUCH DONE AT WORK, SCHOOL OR AT HOME: NONE OF THE TIME
ACT_TOTALSCORE: 24
QUESTION_2 LAST FOUR WEEKS HOW OFTEN HAVE YOU HAD SHORTNESS OF BREATH: NOT AT ALL
QUESTION_3 LAST FOUR WEEKS HOW OFTEN DID YOUR ASTHMA SYMPTOMS (WHEEZING, COUGHING, SHORTNESS OF BREATH, CHEST TIGHTNESS OR PAIN) WAKE YOU UP AT NIGHT OR EARLIER THAN USUAL IN THE MORNING: ONCE OR TWICE
QUESTION_4 LAST FOUR WEEKS HOW OFTEN HAVE YOU USED YOUR RESCUE INHALER OR NEBULIZER MEDICATION (SUCH AS ALBUTEROL): NOT AT ALL
ACT_TOTALSCORE: 24

## 2023-03-01 ASSESSMENT — ENCOUNTER SYMPTOMS
WEAKNESS: 0
PALPITATIONS: 0
NERVOUS/ANXIOUS: 1
ARTHRALGIAS: 1
FREQUENCY: 1
PARESTHESIAS: 0
EYE PAIN: 0
DYSURIA: 0
HEMATOCHEZIA: 0
HEARTBURN: 0
SHORTNESS OF BREATH: 0
MYALGIAS: 1
COUGH: 1
CONSTIPATION: 0
DIZZINESS: 0
HEMATURIA: 0
NAUSEA: 0
DIARRHEA: 0
CHILLS: 0
ABDOMINAL PAIN: 0
HEADACHES: 0
FEVER: 0
JOINT SWELLING: 0
BREAST MASS: 0

## 2023-03-01 ASSESSMENT — ACTIVITIES OF DAILY LIVING (ADL): CURRENT_FUNCTION: NO ASSISTANCE NEEDED

## 2023-03-01 ASSESSMENT — PAIN SCALES - GENERAL: PAINLEVEL: NO PAIN (0)

## 2023-03-01 NOTE — PROGRESS NOTES
"SUBJECTIVE:   Marcy is a 77 year old who presents for Preventive Visit.  Patient has been advised of split billing requirements and indicates understanding: Yes  Are you in the first 12 months of your Medicare coverage?  No    Healthy Habits:     In general, how would you rate your overall health?  Good    Frequency of exercise:  4-5 days/week    Duration of exercise:  45-60 minutes    Do you usually eat at least 4 servings of fruit and vegetables a day, include whole grains    & fiber and avoid regularly eating high fat or \"junk\" foods?  Yes    Taking medications regularly:  Yes    Medication side effects:  None    Ability to successfully perform activities of daily living:  No assistance needed    Home Safety:  No safety concerns identified    Hearing Impairment:  No hearing concerns    In the past 6 months, have you been bothered by leaking of urine? Yes    In general, how would you rate your overall mental or emotional health?  Fair      PHQ-2 Total Score: 1    Additional concerns today:  Yes      Have you ever done Advance Care Planning? (For example, a Health Directive, POLST, or a discussion with a medical provider or your loved ones about your wishes): Yes, patient states has an Advance Care Planning document and will bring a copy to the clinic.       Fall risk  Fallen 2 or more times in the past year?: No  Any fall with injury in the past year?: No    Cognitive Screening pt refused     Do you have sleep apnea, excessive snoring or daytime drowsiness?: yes daytime drowsiness    Reviewed and updated as needed this visit by clinical staff   Tobacco  Allergies  Meds  Problems  Med Hx  Surg Hx  Fam Hx          Reviewed and updated as needed this visit by Provider   Tobacco  Allergies  Meds  Problems  Med Hx  Surg Hx  Fam Hx         Social History     Tobacco Use     Smoking status: Never     Smokeless tobacco: Never   Substance Use Topics     Alcohol use: Yes     Comment: 2 daily (wine) "         Alcohol Use 3/1/2023   Prescreen: >3 drinks/day or >7 drinks/week? No   AUDIT SCORE  -           URINARY TRACT SYMPTOMS      Duration: years     Description  urgency and incontinence    Intensity:  moderate    Accompanying signs and symptoms:  Fever/chills: no   Flank pain no   Nausea and vomiting: no   Vaginal symptoms: none  Abdominal/Pelvic Pain: no     History  History of frequent UTI's: no   History of kidney stones: no   Sexually Active: YES  Possibility of pregnancy: No    Precipitating or alleviating factors: None    Therapies tried and outcome: none       Most urge and stress incont sxs     Anxiety Follow-Up    How are you doing with your anxiety since your last visit? No change    Are you having other symptoms that might be associated with anxiety? No    Have you had a significant life event? No     Are you feeling depressed? No    Do you have any concerns with your use of alcohol or other drugs? No    Social History     Tobacco Use     Smoking status: Never     Smokeless tobacco: Never   Substance Use Topics     Alcohol use: Yes     Comment: 2 daily (wine)     Drug use: No     JARETT-7 SCORE 4/19/2017 5/4/2018 3/19/2020   Total Score - 0 (minimal anxiety) 3 (minimal anxiety)   Total Score 2 0 3     PHQ 4/19/2017 4/19/2017 5/4/2018   PHQ-9 Total Score 5 6 2   Q9: Thoughts of better off dead/self-harm past 2 weeks Not at all Not at all Not at all           Current providers sharing in care for this patient include:   Patient Care Team:  Xiao Dasilva MD as PCP - General (Family Practice)  Xiao Dasilva MD as Assigned PCP  Evelyne Hooper PA-C as Physician Assistant (Dermatology)  Evelyne Hooper PA-C as Assigned Surgical Provider  Evelyne Yen AuD as Audiologist (Audiology)  Parish Velázquez MD as MD (Otolaryngology)    The following health maintenance items are reviewed in Epic and correct as of today:  Health Maintenance   Topic Date Due     ZOSTER IMMUNIZATION (3 of  "3) 11/24/2020     ASTHMA ACTION PLAN  02/24/2021     MEDICARE ANNUAL WELLNESS VISIT  10/26/2022     COLORECTAL CANCER SCREENING  07/11/2023     ASTHMA CONTROL TEST  09/01/2023     ANNUAL REVIEW OF HM ORDERS  03/01/2024     FALL RISK ASSESSMENT  03/01/2024     LIPID  08/10/2026     DTAP/TDAP/TD IMMUNIZATION (4 - Td or Tdap) 11/07/2027     ADVANCE CARE PLANNING  03/01/2028     DEXA  04/04/2032     HEPATITIS C SCREENING  Completed     PHQ-2 (once per calendar year)  Completed     INFLUENZA VACCINE  Completed     Pneumococcal Vaccine: 65+ Years  Completed     COVID-19 Vaccine  Completed     IPV IMMUNIZATION  Aged Out     MENINGITIS IMMUNIZATION  Aged Out     MAMMO SCREENING  Discontinued             Pertinent mammograms are reviewed under the imaging tab.    Review of Systems   Constitutional: Negative for chills and fever.   HENT: Positive for congestion. Negative for ear pain and hearing loss.    Eyes: Negative for pain and visual disturbance.   Respiratory: Positive for cough. Negative for shortness of breath.    Cardiovascular: Negative for chest pain, palpitations and peripheral edema.   Gastrointestinal: Negative for abdominal pain, constipation, diarrhea, heartburn, hematochezia and nausea.   Breasts:  Negative for tenderness, breast mass and discharge.   Genitourinary: Positive for frequency. Negative for dysuria, genital sores, hematuria, pelvic pain, urgency, vaginal bleeding and vaginal discharge.   Musculoskeletal: Positive for arthralgias and myalgias. Negative for joint swelling.   Skin: Negative for rash.   Neurological: Negative for dizziness, weakness, headaches and paresthesias.   Psychiatric/Behavioral: Negative for mood changes. The patient is nervous/anxious.      sinsus sxs are flaring   Yellow green mucous   More in her head     OBJECTIVE:   /80   Pulse 77   Temp 98  F (36.7  C) (Tympanic)   Resp 12   Ht 1.676 m (5' 6\")   Wt 86.2 kg (190 lb)   SpO2 97%   BMI 30.67 kg/m   Estimated " "body mass index is 30.67 kg/m  as calculated from the following:    Height as of this encounter: 1.676 m (5' 6\").    Weight as of this encounter: 86.2 kg (190 lb).  Physical Exam  GENERAL APPEARANCE: healthy, alert and no distress  EYES: Eyes grossly normal to inspection, PERRL and conjunctivae and sclerae normal  HENT: ear canals and TM's normal, nose and mouth without ulcers or lesions, oropharynx clear and oral mucous membranes moist  NECK: no adenopathy, no asymmetry, masses, or scars and thyroid normal to palpation  RESP: lungs clear to auscultation - no rales, rhonchi or wheezes  BREAST: normal without masses, tenderness or nipple discharge and no palpable axillary masses or adenopathy  CV: regular rate and rhythm, normal S1 S2, no S3 or S4, no murmur, click or rub, no peripheral edema and peripheral pulses strong  ABDOMEN: soft, nontender, no hepatosplenomegaly, no masses and bowel sounds normal  MS: no musculoskeletal defects are noted and gait is age appropriate without ataxia  SKIN: no suspicious lesions or rashes  NEURO: Normal strength and tone, sensory exam grossly normal, mentation intact and speech normal  PSYCH: mentation appears normal and affect normal/bright    Diagnostic Test Results:  Labs reviewed in Epic    ASSESSMENT / PLAN:   (Z00.00) Encounter for Medicare annual wellness exam  (primary encounter diagnosis)  Comment:   Plan:     (F41.9) Anxiety  Comment: mild flare due to daughter dx with breast CA   Plan:     (K21.9) Gastroesophageal reflux disease without esophagitis  Comment:   Plan: Stable no change in treatment plan.     (J01.00) Subacute maxillary sinusitis  Comment:   Plan: azelastine (ASTELIN) 0.1 % nasal spray,         doxycycline hyclate (VIBRAMYCIN) 100 MG capsule            (N39.41) Urge incontinence of urine  Comment: will consider pelvic floor PT   Plan: Physical Therapy Referral              Patient has been advised of split billing requirements and indicates understanding: " Yes      COUNSELING:  Reviewed preventive health counseling, as reflected in patient instructions        She reports that she has never smoked. She has never used smokeless tobacco.      Appropriate preventive services were discussed with this patient, including applicable screening as appropriate for cardiovascular disease, diabetes, osteopenia/osteoporosis, and glaucoma.  As appropriate for age/gender, discussed screening for colorectal cancer, prostate cancer, breast cancer, and cervical cancer. Checklist reviewing preventive services available has been given to the patient.    Reviewed patients plan of care and provided an AVS. The Basic Care Plan (routine screening as documented in Health Maintenance) for Ruba meets the Care Plan requirement. This Care Plan has been established and reviewed with the Patient.      Xiao Dasilva MD  [facility]    Identified Health Risks:    Information on urinary incontinence and treatment options given to patient.  The patient was provided with suggestions to help her develop a healthy emotional lifestyle.

## 2023-03-01 NOTE — NURSING NOTE
"Chief Complaint   Patient presents with     Wellness Visit     BP (!) 150/70   Pulse 77   Temp 98  F (36.7  C) (Tympanic)   Resp 12   Ht 1.676 m (5' 6\")   Wt 86.2 kg (190 lb)   SpO2 97%   BMI 30.67 kg/m   Estimated body mass index is 30.67 kg/m  as calculated from the following:    Height as of this encounter: 1.676 m (5' 6\").    Weight as of this encounter: 86.2 kg (190 lb).  Patient presents to the clinic using No DME      Health Maintenance that is potentially due pending provider review:    Health Maintenance Due   Topic Date Due     ANNUAL REVIEW OF HM ORDERS  Never done     ZOSTER IMMUNIZATION (3 of 3) 11/24/2020     ASTHMA ACTION PLAN  02/24/2021     MEDICARE ANNUAL WELLNESS VISIT  10/26/2022        n/a        "

## 2023-03-01 NOTE — PATIENT INSTRUCTIONS
Treat sinuses with an antibiotic as discussed     Astelin nose spray     Set up PT for urinary symptoms   Patient Education   Personalized Prevention Plan  You are due for the preventive services outlined below.  Your care team is available to assist you in scheduling these services.  If you have already completed any of these items, please share that information with your care team to update in your medical record.  Health Maintenance Due   Topic Date Due     Zoster (Shingles) Vaccine (3 of 3) 11/24/2020     Asthma Action Plan - yearly  02/24/2021     Annual Wellness Visit  10/26/2022       Urinary Incontinence, Female (Adult)   Urinary incontinence means loss of bladder control. This problem affects many women, especially as they get older. If you have incontinence, you may be embarrassed to ask for help. But know that this problem can be treated.   Types of Incontinence  There are different types of incontinence. Two of the main types are described here. You can have more than one type.     Stress incontinence. With this type, urine leaks when pressure (stress) is put on the bladder. This may happen when you cough, sneeze, or laugh. Stress incontinence most often occurs because the pelvic floor muscles that support the bladder and urethra are weak. This can happen after pregnancy and vaginal childbirth or a hysterectomy. It can also be due to excess body weight or hormone changes.    Urge incontinence (also called overactive bladder). With this type, a sudden urge to urinate is felt often. This may happen even though there may not be much urine in the bladder. The need to urinate often during the night is common. Urge incontinence most often occurs because of bladder spasms. This may be due to bladder irritation or infection. Damage to bladder nerves or pelvic muscles, constipation, and certain medicines can also lead to urge incontinence.  Treatment depends on the cause. Further evaluation is needed to find the  type you have. This will likely include an exam and certain tests. Based on the results, you and your healthcare provider can then plan treatment. Until a diagnosis is made, the home care tips below can help ease symptoms.   Home care    Do pelvic floor muscle exercises, if they are prescribed. The pelvic floor muscles help support the bladder and urethra. Many women find that their symptoms improve when doing special exercises that strengthen these muscles. To do the exercises, contract the muscles you would use to stop your stream of urine. But do this when you re not urinating. Hold for 10 seconds, then relax. Repeat 10 to 20 times in a row, at least 3 times a day. Your healthcare provider may give you other instructions for how to do the exercises and how often.    Keep a bladder diary. This helps track how often and how much you urinate over a set period of time. Bring this diary with you to your next visit with the provider. The information can help your provider learn more about your bladder problem.    Lose weight, if advised to by your provider. Extra weight puts pressure on the bladder. Your provider can help you create a weight-loss plan that s right for you. This may include exercising more and making certain diet changes.    Don't have foods and drinks that may irritate the bladder. These can include alcohol and caffeinated drinks.    Quit smoking. Smoking and other tobacco use can lead to a long-term (chronic) cough that strains the pelvic floor muscles. Smoking may also damage the bladder and urethra. Talk with your provider about treatments or methods you can use to quit smoking.    If drinking large amounts of fluid makes you have symptoms, you may be advised to limit your fluid intake. You may also be advised to drink most of your fluids during the day and to limit fluids at night.    If you re worried about urine leakage or accidents, you may wear absorbent pads to catch urine. Change the pads  often. This helps reduce discomfort. It may also reduce the risk of skin or bladder infections.    Follow-up care  Follow up with your healthcare provider, or as directed. It may take some to find the right treatment for your problem. But healthy lifestyle changes can be made right away. These include such things as exercising on a regular basis, eating a healthy diet, losing weight (if needed), and quitting smoking. Your treatment plan may include special therapies or medicines. Certain procedures or surgery may also be options. Talk about any questions you have with your provider.   When to seek medical advice  Call the healthcare provider right away if any of these occur:    Fever of 100.4 F (38 C) or higher, or as directed by your provider    Bladder pain or fullness    Belly swelling    Nausea or vomiting    Back pain    Weakness, dizziness, or fainting  [employer] last reviewed this educational content on 1/1/2020 2000-2021 [employer]. All rights reserved. This information is not intended as a substitute for professional medical care. Always follow your healthcare professional's instructions.        Your Health Risk Assessment indicates you feel you are not in good emotional health.    Recreation   Recreation is not limited to sports and team events. It includes any activity that provides relaxation, interest, enjoyment, and exercise. Recreation provides an outlet for physical, mental, and social energy. It can give a sense of worth and achievement. It can help you stay healthy.    Mental Exercise and Social Involvement  Mental and emotional health is as important as physical health. Keep in touch with friends and family. Stay as active as possible. Continue to learn and challenge yourself.   Things you can do to stay mentally active are:    Learn something new, like a foreign language or musical instrument.     Play SCRABBLE or do crossword puzzles. If you cannot find people to play these  games with you at home, you can play them with others on your computer through the Internet.     Join a games club--anything from card games to chess or checkers or lawn bowling.     Start a new hobby.     Go back to school.     Volunteer.     Read.   Keep up with world events.

## 2023-03-08 ENCOUNTER — HOSPITAL ENCOUNTER (OUTPATIENT)
Dept: PHYSICAL THERAPY | Facility: CLINIC | Age: 78
Setting detail: THERAPIES SERIES
Discharge: HOME | End: 2023-03-08
Attending: FAMILY MEDICINE
Payer: MEDICARE

## 2023-03-08 PROCEDURE — 97110 THERAPEUTIC EXERCISES: CPT | Mod: GP | Performed by: PHYSICAL MEDICINE & REHABILITATION

## 2023-03-08 PROCEDURE — 97140 MANUAL THERAPY 1/> REGIONS: CPT | Mod: GP | Performed by: PHYSICAL MEDICINE & REHABILITATION

## 2023-03-10 ENCOUNTER — TELEPHONE (OUTPATIENT)
Dept: FAMILY MEDICINE | Facility: CLINIC | Age: 78
End: 2023-03-10
Payer: MEDICARE

## 2023-03-10 NOTE — TELEPHONE ENCOUNTER
Patient stopped in clinic asking about shingles vaccinations. Her last vaccination was 2020. Does patient need to restart series or can she have 2nd dose to be complete?         Could we send this information to you in [employer] or would you prefer to receive a phone call?:   Patient would prefer a phone call   Okay to leave a detailed message?: Yes at Cell number on file:    Telephone Information:   Mobile [phone]

## 2023-03-10 NOTE — TELEPHONE ENCOUNTER
Spoke with patient informed her she just needs to receive the 2nd injection and series is complete.    Julie Behrendt RN

## 2023-03-22 ENCOUNTER — HOSPITAL ENCOUNTER (OUTPATIENT)
Dept: PHYSICAL THERAPY | Facility: CLINIC | Age: 78
Setting detail: THERAPIES SERIES
Discharge: HOME | End: 2023-03-22
Attending: FAMILY MEDICINE
Payer: MEDICARE

## 2023-03-22 DIAGNOSIS — G89.29 CHRONIC PAIN OF RIGHT KNEE: ICD-10-CM

## 2023-03-22 DIAGNOSIS — S39.012D STRAIN OF LUMBAR REGION, SUBSEQUENT ENCOUNTER: ICD-10-CM

## 2023-03-22 DIAGNOSIS — M25.561 CHRONIC PAIN OF RIGHT KNEE: ICD-10-CM

## 2023-03-22 PROCEDURE — 97110 THERAPEUTIC EXERCISES: CPT | Mod: GP | Performed by: PHYSICAL THERAPIST

## 2023-03-22 PROCEDURE — 97161 PT EVAL LOW COMPLEX 20 MIN: CPT | Mod: GP | Performed by: PHYSICAL THERAPIST

## 2023-03-23 NOTE — PROGRESS NOTES
[facility]    OUTPATIENT PHYSICAL THERAPY ORTHOPEDIC EVALUATION  PLAN OF TREATMENT FOR OUTPATIENT REHABILITATION  (COMPLETE FOR INITIAL CLAIMS ONLY)  Patient's Last Name, First Name, M.I.  YOB: 1945  Ruba Farmer    Provider s Name:  [facility]   Medical Record No.  [id number]   Start of Care Date:  03/22/23   Onset Date:  03/01/23   Type:     _X__PT   ___OT   ___SLP Medical Diagnosis:  Urge incontinence of urine (N39.41)     PT Diagnosis:  PFM weakness and increase tone   Visits from SOC:  1      _________________________________________________________________________________  Plan of Treatment/Functional Goals:  ADL retraining, gait training, joint mobilization, manual therapy, motor coordination training, neuromuscular re-education, ROM, strengthening, stretching, transfer training     Cryotherapy, Electrical stimulation, Hot packs, TENS, Traction, Biofeedback     Goals  Goal Identifier: Water intake  Goal Description: Pt will drink approx 80 ox of fluid per day to help w proper hydration and reduce irritation on bladder  Target Date: 04/20/23    Goal Identifier: 3-4 hours  Goal Description: 1) Pt will improve emptying of bladder thru improved toileting techniques, and report void times of every 3-4 hours consistently  Target Date: 04/20/23    Goal Identifier: night time euresis (midnight to 730 - am)  Goal Description: Pt will relate going to the bathroom 1 time or less during the nigth to demonsrate improved strength  Target Date: 05/18/23    Goal Identifier: dry  Goal Description: Pt will improve PFM strength to report staying dry 12/14 days  Target Date: 05/18/23                 Therapy Frequency:  1 time/week  Predicted Duration of Therapy Intervention:  8 weeks    Ai Jones, PT                 I CERTIFY THE NEED FOR THESE SERVICES FURNISHED UNDER         THIS PLAN OF TREATMENT AND WHILE UNDER MY CARE     (Physician co-signature of this document indicates review and certification of the therapy plan).                     Certification Date From:  03/22/23   Certification Date To:  05/18/23    Referring Provider:  Xiao Dasilva MD    Initial Assessment        See Epic Evaluation Start of Care Date: 03/22/23 03/22/23 1000   General Information   Type of Visit Initial OP Ortho PT Evaluation   Start of Care Date 03/22/23   Referring Physician Xiao Dasilva MD   Patient/Family Goals Statement reduce leaking   Orders Evaluate and Treat   Orders Comment Incontinence - Urinary   Date of Order 03/01/23   Certification Required? Yes   Medical Diagnosis Urge incontinence of urine (N39.41)   Surgical/Medical history reviewed Yes   Precautions/Limitations no known precautions/limitations   General Information Comments PMH: pre cancer, bladder contraol, OA< hysterectomy, shoulder surgery,   Body Part(s)   Body Part(s) Pelvic Floor Dysfunction   Presentation and Etiology   Pertinent history of current problem (include personal factors and/or comorbidities that impact the POC) Pt relates having incontnece for approx past year. Pt relates she would get home and get urge then leak on way into the house. Pt relates in the last months she has started to wear pads. Pt relates hx of UTI and hemroids. Pt is already doing kegels.   Impairments P. Bowel or bladder problems   Functional Limitations perform activities of daily living   Symptom Location PFM   How/Where did it occur From insidious onset   Onset date of current episode/exacerbation 03/01/23   Chronicity Chronic   Pain rating (0-10 point scale) Denies pain   Current Level of Function   Current Community Support Family/friend caregiver   Patient role/employment history A. Employed   Employment Comments pt volunteers   Living environment [place]   Fall Risk Screen   Fall screen completed  "by PT   Have you fallen 2 or more times in the past year? No   Have you fallen and had an injury in the past year? No   Is patient a fall risk? No   Abuse Screen (yes response referral indicated)   Feels Unsafe at Home or Work/School no   Feels Threatened by Someone no   Does Anyone Try to Keep You From Having Contact with Others or Doing Things Outside Your Home? no   Physical Signs of Abuse Present no   Functional Scales   Functional Scales Other   Other Scales  PRAVEENA-6: 6, AUA:12   Specific Questions   Specific Questions Pelvic Floor Dysfunction;Pregnancy;Women's Health   Pelvic Floor Dysfunction Questions   Regular exercise Yes  (Exercises for everything)   Fluid intake-glasses/day (one glass/cup = 8oz 45 oz   Caffeinated beverages-glasses/day 3 cups of decafinated coffee   Alcoholic beverages - glasses/day 1 glass of wine   Recent diet change? No  (Gluten free)   How long can you delay the need to urinate?  not at all   How many times do you wake to urinate at night?   3 times   How often do you urinate during the day?   5 times on average   Can you stop the flow of urine when on the toilet?  Yes   Is the volume of urine passed usually  Small   Do you have the sensation that you need to go to the toilet?  Yes   Do you empty your bladder frequently, before you experience the urge to pass urine?  Yes   Do you have \"triggers\" that make you feel you can't wait to go to the toilet?  Yes  (pulling in driveway)   Number of bladder infections last year?  0   Frequency of bowel movements:  Chronic constipation issues - now taking daily supplement - having BM 2-3 times. Has a hx of hemroids   Consistency of stool?  Soft formed   Do you ignore the urge to defecate?  No   Pelvic Floor Dysfunction Comments Pt relates has to double void, not sure if able to get all out   Pelvic Floor Dysfunction Objective Findings   Protection needed Pad   Pad wearing 1 per day   Planned Therapy Interventions   Planned Therapy Interventions " ADL retraining;gait training;joint mobilization;manual therapy;motor coordination training;neuromuscular re-education;ROM;strengthening;stretching;transfer training   Planned Modality Interventions   Planned Modality Interventions Cryotherapy;Electrical stimulation;Hot packs;TENS;Traction;Biofeedback   Clinical Impression   Criteria for Skilled Therapeutic Interventions Met yes, treatment indicated   PT Diagnosis PFM weakness and increase tone   Influenced by the following impairments weakness, tightness   Functional limitations due to impairments hx of UTI and hemroids, incontinence, freq uringation   Clinical Presentation Stable/Uncomplicated   Clinical Presentation Rationale Pt is 77 yr old female who presents w increased freq, urge incontince and signs and symptoms of increased tone. Pt is motivated to get better and very active   Clinical Decision Making (Complexity) Low complexity   Therapy Frequency 1 time/week   Predicted Duration of Therapy Intervention (days/wks) 8 weeks   Risk & Benefits of therapy have been explained Yes   Patient, Family & other staff in agreement with plan of care Yes   Pelvic Health Informed Consent Statement Discussed with patient/guardian reason for referral regarding pelvic health needs and external/internal pelvic floor muscle examination.  Opportunity provided to ask questions and verbal consent for assessment and intervention was given.   Education Assessment   Preferred Learning Style Listening;Reading;Demonstration;Pictures/video   Barriers to Learning No barriers   ORTHO GOALS   PT Ortho Eval Goals 2;3;4;1;5;6   Ortho Goal 1   Goal Identifier Water intake   Goal Description Pt will drink approx 80 ox of fluid per day to help w proper hydration and reduce irritation on bladder   Target Date 04/20/23   Ortho Goal 2   Goal Identifier 3-4 hours   Goal Description 1) Pt will improve emptying of bladder thru improved toileting techniques, and report void times of every 3-4 hours  consistently   Target Date 04/20/23   Ortho Goal 3   Goal Identifier night time euresis (midnight to 730 - am)   Goal Description Pt will relate going to the bathroom 1 time or less during the nigth to demonsrate improved strength   Target Date 05/18/23   Ortho Goal 4   Goal Identifier dry   Goal Description Pt will improve PFM strength to report staying dry 12/14 days   Target Date 05/18/23   Total Evaluation Time   PT Eval, Low Complexity Minutes (82520) 25   Therapy Certification   Certification date from 03/22/23   Certification date to 05/18/23   Medical Diagnosis Urge incontinence of urine (N39.41)

## 2023-03-29 ENCOUNTER — HOSPITAL ENCOUNTER (OUTPATIENT)
Dept: PHYSICAL THERAPY | Facility: CLINIC | Age: 78
Setting detail: THERAPIES SERIES
Discharge: HOME | End: 2023-03-29
Attending: FAMILY MEDICINE
Payer: MEDICARE

## 2023-03-29 PROCEDURE — 97140 MANUAL THERAPY 1/> REGIONS: CPT | Mod: GP | Performed by: PHYSICAL THERAPIST

## 2023-03-29 PROCEDURE — 97110 THERAPEUTIC EXERCISES: CPT | Mod: GP | Performed by: PHYSICAL MEDICINE & REHABILITATION

## 2023-03-29 PROCEDURE — 97530 THERAPEUTIC ACTIVITIES: CPT | Mod: GP | Performed by: PHYSICAL THERAPIST

## 2023-03-29 PROCEDURE — 97110 THERAPEUTIC EXERCISES: CPT | Mod: GP | Performed by: PHYSICAL THERAPIST

## 2023-04-12 ENCOUNTER — HOSPITAL ENCOUNTER (OUTPATIENT)
Dept: PHYSICAL THERAPY | Facility: CLINIC | Age: 78
Setting detail: THERAPIES SERIES
Discharge: HOME | End: 2023-04-12
Attending: FAMILY MEDICINE
Payer: MEDICARE

## 2023-04-12 PROCEDURE — 97110 THERAPEUTIC EXERCISES: CPT | Mod: GP | Performed by: PHYSICAL THERAPIST

## 2023-04-12 NOTE — PROGRESS NOTES
History of Present Illness - Ruba Farmer is a very pleasant 77 year old female I last saw in 2020 as a follow up from the ER where she was diagonsed with acute sensorineural hearing loss.    Follow-up audiogram was done on 1/31/2020, and thankfully showed completely normal hearing bilaterally.    She returns today due to continued issues with ear symptoms.  She has not had any sudden changes in hearing.  But her ears feel moist and wet at times.    Past Medical History -   Patient Active Problem List   Diagnosis     Anxiety     CARDIOVASCULAR SCREENING; LDL GOAL LESS THAN 160     GERD (gastroesophageal reflux disease)     Advanced directives, counseling/discussion     Polyp of colon, adenomatous     Senile nuclear sclerosis     Recurrent UTI     Nonallergic rhinitis     Diagnostic skin and sensitization tests (aka ALLERGENS)     Mild persistent asthma without complication     Strain of lumbar region     Chronic congestion of paranasal sinus     Chronic pain of right knee     Chronic upper back pain       Current Medications -   Current Outpatient Medications:      albuterol (PROAIR HFA/PROVENTIL HFA/VENTOLIN HFA) 108 (90 Base) MCG/ACT inhaler, Inhale 2 puffs into the lungs every 6 hours as needed for shortness of breath / dyspnea, Disp: 1 Inhaler, Rfl: 1     Ascorbic Acid (VITAMIN C) 500 MG CAPS, Take 500 mg by mouth daily , Disp: , Rfl:      azelastine (ASTELIN) 0.1 % nasal spray, Spray 1 spray into both nostrils 2 times daily, Disp: 30 mL, Rfl: 1     azithromycin (ZITHROMAX) 250 MG tablet, TAKE 2 TABLETS BY MOUTH ON DAY 1, THEN 1 TABLET DAILY ON DAYS 2 THROUGH 5., Disp: 6 tablet, Rfl: 10     Cholecalciferol (VITAMIN D) 2000 UNITS CAPS, Take 2,000 Units by mouth daily , Disp: , Rfl:      Coenzyme Q10 (COQ10 PO), Take 1 capsule by mouth daily, Disp: , Rfl:      dextromethorphan-guaiFENesin (MUCINEX DM)  MG per 12 hr tablet, Take 1 tablet by mouth every 12 hours, Disp: , Rfl:      doxycycline hyclate  (VIBRAMYCIN) 100 MG capsule, Take 1 capsule (100 mg) by mouth 2 times daily, Disp: 20 capsule, Rfl: 0     Fexofenadine HCl (ALLEGRA ALLERGY PO), Take by mouth daily, Disp: , Rfl:      fluticasone (FLONASE) 50 MCG/ACT spray, Spray 1-2 sprays into both nostrils daily, Disp: 16 g, Rfl: 0     ibuprofen (ADVIL/MOTRIN) 200 MG tablet, 1-3 tablets as needed with food, Disp: 120 tablet, Rfl: 0     LORazepam (ATIVAN) 0.5 MG tablet, Take 1 tablet (0.5 mg) by mouth every 8 hours as needed for anxiety, Disp: 20 tablet, Rfl: 0     Magnesium Oxide 250 MG TABS, , Disp: , Rfl:      neomycin-polymyxin-dexamethasone (MAXITROL) 3.5-91219-6.1 ophthalmic ointment, as needed, Disp: , Rfl:      OVER-THE-COUNTER, D-monos takes after SIC to prevent UTIs, Disp: , Rfl:      POTASSIUM CITRATE PO, Take 1 tablet by mouth daily, Disp: , Rfl:      pseudoePHEDrine (SUDAFED) 30 MG tablet, Take 30 mg by mouth every 4 hours as needed for congestion, Disp: , Rfl:      Ruxolitinib Phosphate (OPZELURA) 1.5 % CREA, Externally apply 1 Application topically 2 times daily Apply to AA BID x 6 months do not put on more than 10% tbsa, Disp: 60 g, Rfl: 5     sulfamethoxazole-trimethoprim (BACTRIM DS) 800-160 MG tablet, 1 tab after intercourse (Patient taking differently: 1 tab after intercourse (PRN)), Disp: 30 tablet, Rfl: 1     Urea 40 % CREA, Apply to feet QHS, Disp: 227 g, Rfl: 11    Allergies -   Allergies   Allergen Reactions     Macrobid [Nitrofuran Derivatives] Shortness Of Breath and Other (See Comments)     High fever     Codeine Other (See Comments) and Nausea and Vomiting     headache       Social History -   Social History     Socioeconomic History     Marital status:    Tobacco Use     Smoking status: Never     Smokeless tobacco: Never   Substance and Sexual Activity     Alcohol use: Yes     Comment: 2 daily (wine)     Drug use: No     Sexual activity: Yes     Partners: Male     Birth control/protection: Post-menopausal, Female Surgical      Comment: hyst   Other Topics Concern     Parent/sibling w/ CABG, MI or angioplasty before 65F 55M? No       Family History -   Family History   Problem Relation Age of Onset     Cancer Mother         endometrail cancer     Heart Disease Mother      Neurologic Disorder Mother         fibromyalgia     Other Cancer Mother      Diabetes Father      Cancer Father         pancreatic cancer     Colon Cancer Father      Heart Disease Maternal Grandmother      Prostate Cancer Maternal Grandfather      Cancer Paternal Grandfather         lung     Genitourinary Problems Paternal Grandfather         dialysis     Other Cancer Paternal Grandfather      Diabetes Brother      Depression Brother      Anxiety Disorder Brother      Mental Illness Brother      Other Cancer Paternal Grandmother      Cancer Son      Other Cancer Son        Review of Systems - As per HPI and PMHx, otherwise 10+ system review of the head and neck, and general constitution is negative.    Physical Exam  Pulse 86   SpO2 98%     General - The patient is well nourished and well developed, and appears to have good nutritional status.  Alert and oriented to person and place, answers questions and cooperates with examination appropriately.   Head and Face - Normocephalic and atraumatic, with no gross asymmetry noted of the contour of the facial features.  The facial nerve is intact, with strong symmetric movements.  Voice and Breathing - The patient was breathing comfortably without the use of accessory muscles. There was no wheezing, stridor, or stertor.  The patients voice was clear and strong, and had appropriate pitch and quality.  Ears - The tympanic membranes are normal in appearance, bony landmarks are intact.  No retraction, perforation, or masses.  No fluid or purulence was seen in the external canal or the middle ear. No evidence of infection of the middle ear or external canal, cerumen was normal in appearance.  Eyes - Extraocular movements intact,  and the pupils were reactive to light.  Sclera were not icteric or injected, conjunctiva were pink and moist.  Mouth - Examination of the oral cavity showed pink, healthy oral mucosa. No lesions or ulcerations noted.  The tongue was mobile and midline, and the dentition were in good condition.    Throat - The walls of the oropharynx were smooth, pink, moist, symmetric, and had no lesions or ulcerations.  The tonsillar pillars and soft palate were symmetric.  The uvula was midline on elevation.    Neck - Normal midline excursion of the laryngotracheal complex during swallowing.  Full range of motion on passive movement.  Palpation of the occipital, submental, submandibular, internal jugular chain, and supraclavicular nodes did not demonstrate any abnormal lymph nodes or masses.  The carotid pulse was palpable bilaterally.  Palpation of the thyroid was soft and smooth, with no nodules or goiter appreciated.  The trachea was mobile and midline.  Nose - External contour is symmetric, no gross deflection or scars.  Nasal mucosa is pink and moist with no abnormal mucus.  The septum was midline and non-obstructive, turbinates of normal size and position.  No polyps, masses, or purulence noted on examination.    Audiologic Studies - An audiogram and tympanogram were performed today as part of the evaluation and personally reviewed, as well as compared to the most recent test done in January 2020. The tympanogram shows a normal Type A curve, with normal canal volume and middle ear pressure.  There is no sign of eustachian tube dysfunction or middle ear effusion.  The audiogram was unchanged, with a borderline mild sensorineural hearing loss in both ears, up to 2000Hz, where there is a very small down sloping sensorineural hearing loss in both ears.  No conductive hearing loss, normal Word Recognition      A/P - Ruba Farmer is a 77 year old female  (H90.3) SNHL (sensory-neural hearing loss), asymmetrical  (primary  encounter diagnosis)    The hearing has objectively remained stable.  Get another one in 2 years    I have also sent in Dermotic for her eczematous ear canals

## 2023-04-13 ENCOUNTER — HOSPITAL ENCOUNTER (OUTPATIENT)
Dept: PHYSICAL THERAPY | Facility: CLINIC | Age: 78
Setting detail: THERAPIES SERIES
Discharge: HOME | End: 2023-04-13
Attending: FAMILY MEDICINE
Payer: MEDICARE

## 2023-04-13 PROCEDURE — 97110 THERAPEUTIC EXERCISES: CPT | Mod: GP | Performed by: PHYSICAL MEDICINE & REHABILITATION

## 2023-04-13 PROCEDURE — 97140 MANUAL THERAPY 1/> REGIONS: CPT | Mod: GP | Performed by: PHYSICAL MEDICINE & REHABILITATION

## 2023-04-14 NOTE — PROGRESS NOTES
[facility]    OUTPATIENT PHYSICAL THERAPY  PLAN OF TREATMENT FOR OUTPATIENT REHABILITATION AND PROGRESS NOTE           Patient's Last Name, First Name, Ruba Potter Date of Birth  1945   Provider's Name  [facility] Medical Record No.  [id number]    Onset Date  2/7/23 Start of Care Date  2/21/23   Type:     _X_PT   ___OT   ___SLP Medical Diagnosis  Strain of lumbar region, subsequent encounter  - Primary;     Chronic pain of right knee   PT Diagnosis  chronic LBP and knee pain Plan of Treatment  Frequency/Duration: 1x/week for 2 weeks  Certification date from 3/29/23 to 4/14/23     Goals:  Goal Identifier 1   Goal Description Pt will report 50% reduction in itching pain of thoracic spine in order to decrease difficulty with ADLs   Target Date 4/14/23   Date Met      Progress (detail required for progress note): reports better, but still present     Goal Identifier 2   Goal Description Pt will be able to perform sit <> stand with proper body mechanics in order to decrease difficulty with car transfers   Target Date 4/14/23   Date Met      Progress (detail required for progress note): notes still feels stiff getting out of the car     Goal Identifier 3   Goal Description Pt will be independent with HEP in order to self manage symptoms.   Target Date 4/14/23   Date Met      Progress (detail required for progress note): LTG, I with current HEP     Goal Identifier 4   Goal Description Pt will be able to demonstrate 5-/5 s/l hip abd in order to decrease difficulty with downhill amb and stair descent.   Target Date 04/14/23   Date Met      Progress (detail required for progress note):         Beginning/End Dates of Progress Note Reporting Period:  2/21/23 to 3/29/23    Progress Toward Goals:   Progress this reporting period: Pt has attended 4 PT sessions over  the last month, and has met 0/4 short term and long term goals. Pt reports radicular sx have improved but are still present. Pt is appropriate for continued skilled PT to address residual deficits, in order to decrease difficulty with ADLs.      Client Self (Subjective) Report for Progress Note Reporting Period: Reports back still itching, notes maybe less    Objective Measurements:   Objective Measure: lumbar and knee ROM     Objective Measure: LE strength     Objective Measure: thoracic mobility  Details: hypomobility of T5-7 on R with corresponding ribs          I CERTIFY THE NEED FOR THESE SERVICES FURNISHED UNDER        THIS PLAN OF TREATMENT AND WHILE UNDER MY CARE     (Physician co-signature of this document indicates review and certification of the therapy plan).                Referring Provider: Xiao Dasilva MD Josie Anderson, PT

## 2023-04-14 NOTE — PROGRESS NOTES
[facility]    Outpatient Physical Therapy Discharge Note  Patient: Ruba Farmer  : 1945    Beginning/End Dates of Reporting Period:  23 to 23    Referring Provider: Xiao Dasilva MD     Therapy Diagnosis: chronic LBP and knee pain     Client Self Report: Reports back still itching, notes maybe less intense and frequent but still present. Still having sx of soreness in B thighs with prolonged sitting. Having some difficulty with HEP compliance due to helping daughter who is in the hospital.    Objective Measurements:  Objective Measure: thoracic mobility  Details: hypomobility of T5-7 on R with corresponding ribs    Goals:  Goal Identifier 1   Goal Description Pt will report 50% reduction in itching pain of thoracic spine in order to decrease difficulty with ADLs   Target Date 23   Date Met      Progress (detail required for progress note): reports better, but still present     Goal Identifier 2   Goal Description Pt will be able to perform sit <> stand with proper body mechanics in order to decrease difficulty with car transfers   Target Date 23   Date Met      Progress (detail required for progress note): able to perform proper sit <> stand body mechanics, but notes still feels stiff getting out of the car     Goal Identifier 3   Goal Description Pt will be independent with HEP in order to self manage symptoms.   Target Date 23   Date Met  23   Progress (detail required for progress note): I with current HEP     Goal Identifier 4   Goal Description Pt will be able to demonstrate 5-/5 s/l hip abd in order to decrease difficulty with downhill amb and stair descent.   Target Date 23   Date Met      Progress (detail required for progress note): notes still challenging     Progress towards goals: Pt has attended 5 PT sessions, and has met 1/4 short term and  long term goals. Pt continues to report sx with prolonged sitting and consistent sx of itching in mid back. Despite ROM, stretching, strengthening, postural restoration, and manual therapy, pt continues to see little to no impovement, sx consistently return. PT and pt discussed and agreed upon return to referring provider to discuss further pain management options at this time. It is possible with continued HEP compliance, pt can continued to improve sx, and progress towards remaining goals.    Plan:  Discharge from therapy.    Discharge:    Reason for Discharge: No further expectation of progress.    Equipment Issued: [employer]    Discharge Plan: Patient to continue home program.      Please contact me with any questions or concerns.    Thank you for your referral,     Jeannie Billingsley, PT, DPT  Physical Therapist  [address] [address] [phone]

## 2023-04-21 ENCOUNTER — OFFICE VISIT (OUTPATIENT)
Dept: OTOLARYNGOLOGY | Facility: CLINIC | Age: 78
End: 2023-04-21
Payer: MEDICARE

## 2023-04-21 ENCOUNTER — OFFICE VISIT (OUTPATIENT)
Dept: AUDIOLOGY | Facility: CLINIC | Age: 78
End: 2023-04-21
Payer: MEDICARE

## 2023-04-21 VITALS — OXYGEN SATURATION: 98 % | HEART RATE: 86 BPM

## 2023-04-21 DIAGNOSIS — H90.3 SNHL (SENSORY-NEURAL HEARING LOSS), ASYMMETRICAL: ICD-10-CM

## 2023-04-21 DIAGNOSIS — H90.3 SENSORINEURAL HEARING LOSS, BILATERAL: Primary | ICD-10-CM

## 2023-04-21 DIAGNOSIS — H90.3 SNHL (SENSORY-NEURAL HEARING LOSS), ASYMMETRICAL: Primary | ICD-10-CM

## 2023-04-21 DIAGNOSIS — H60.8X3 CHRONIC ECZEMATOUS OTITIS EXTERNA OF BOTH EARS: ICD-10-CM

## 2023-04-21 PROCEDURE — 92557 COMPREHENSIVE HEARING TEST: CPT

## 2023-04-21 PROCEDURE — 99204 OFFICE O/P NEW MOD 45 MIN: CPT | Performed by: OTOLARYNGOLOGY

## 2023-04-21 PROCEDURE — 92550 TYMPANOMETRY & REFLEX THRESH: CPT

## 2023-04-21 RX ORDER — PREDNISONE 20 MG/1
20 TABLET ORAL 2 TIMES DAILY
Qty: 14 TABLET | Refills: 0 | Status: SHIPPED | OUTPATIENT
Start: 2023-04-21 | End: 2023-04-28

## 2023-04-21 RX ORDER — FLUOCINOLONE ACETONIDE 0.11 MG/ML
5 OIL AURICULAR (OTIC) 2 TIMES DAILY
Qty: 3.5 ML | Refills: 11 | Status: SHIPPED | OUTPATIENT
Start: 2023-04-21

## 2023-04-21 ASSESSMENT — PAIN SCALES - GENERAL: PAINLEVEL: NO PAIN (0)

## 2023-04-21 NOTE — LETTER
4/21/2023         RE: Ruba Farmer  [id number] [facility] [id number]        Dear Colleague,    Thank you for referring your patient, Ruba Farmer, to the [facility]. Please see a copy of my visit note below.    History of Present Illness - Ruba Farmer is a very pleasant 77 year old female I last saw in 2020 as a follow up from the ER where she was diagonsed with acute sensorineural hearing loss.    Follow-up audiogram was done on 1/31/2020, and thankfully showed completely normal hearing bilaterally.    She returns today due to continued issues with ear symptoms.  She has not had any sudden changes in hearing.  But her ears feel moist and wet at times.    Past Medical History -   Patient Active Problem List   Diagnosis     Anxiety     CARDIOVASCULAR SCREENING; LDL GOAL LESS THAN 160     GERD (gastroesophageal reflux disease)     Advanced directives, counseling/discussion     Polyp of colon, adenomatous     Senile nuclear sclerosis     Recurrent UTI     Nonallergic rhinitis     Diagnostic skin and sensitization tests (aka ALLERGENS)     Mild persistent asthma without complication     Strain of lumbar region     Chronic congestion of paranasal sinus     Chronic pain of right knee     Chronic upper back pain       Current Medications -   Current Outpatient Medications:      albuterol (PROAIR HFA/PROVENTIL HFA/VENTOLIN HFA) 108 (90 Base) MCG/ACT inhaler, Inhale 2 puffs into the lungs every 6 hours as needed for shortness of breath / dyspnea, Disp: 1 Inhaler, Rfl: 1     Ascorbic Acid (VITAMIN C) 500 MG CAPS, Take 500 mg by mouth daily , Disp: , Rfl:      azelastine (ASTELIN) 0.1 % nasal spray, Spray 1 spray into both nostrils 2 times daily, Disp: 30 mL, Rfl: 1     azithromycin (ZITHROMAX) 250 MG tablet, TAKE 2 TABLETS BY MOUTH ON DAY 1, THEN 1 TABLET DAILY ON DAYS 2 THROUGH 5., Disp: 6 tablet, Rfl: 10     Cholecalciferol (VITAMIN D) 2000 UNITS CAPS, Take 2,000 Units by mouth  daily , Disp: , Rfl:      Coenzyme Q10 (COQ10 PO), Take 1 capsule by mouth daily, Disp: , Rfl:      dextromethorphan-guaiFENesin (MUCINEX DM)  MG per 12 hr tablet, Take 1 tablet by mouth every 12 hours, Disp: , Rfl:      doxycycline hyclate (VIBRAMYCIN) 100 MG capsule, Take 1 capsule (100 mg) by mouth 2 times daily, Disp: 20 capsule, Rfl: 0     Fexofenadine HCl (ALLEGRA ALLERGY PO), Take by mouth daily, Disp: , Rfl:      fluticasone (FLONASE) 50 MCG/ACT spray, Spray 1-2 sprays into both nostrils daily, Disp: 16 g, Rfl: 0     ibuprofen (ADVIL/MOTRIN) 200 MG tablet, 1-3 tablets as needed with food, Disp: 120 tablet, Rfl: 0     LORazepam (ATIVAN) 0.5 MG tablet, Take 1 tablet (0.5 mg) by mouth every 8 hours as needed for anxiety, Disp: 20 tablet, Rfl: 0     Magnesium Oxide 250 MG TABS, , Disp: , Rfl:      neomycin-polymyxin-dexamethasone (MAXITROL) 3.5-52594-4.1 ophthalmic ointment, as needed, Disp: , Rfl:      OVER-THE-COUNTER, D-monos takes after SIC to prevent UTIs, Disp: , Rfl:      POTASSIUM CITRATE PO, Take 1 tablet by mouth daily, Disp: , Rfl:      pseudoePHEDrine (SUDAFED) 30 MG tablet, Take 30 mg by mouth every 4 hours as needed for congestion, Disp: , Rfl:      Ruxolitinib Phosphate (OPZELURA) 1.5 % CREA, Externally apply 1 Application topically 2 times daily Apply to AA BID x 6 months do not put on more than 10% tbsa, Disp: 60 g, Rfl: 5     sulfamethoxazole-trimethoprim (BACTRIM DS) 800-160 MG tablet, 1 tab after intercourse (Patient taking differently: 1 tab after intercourse (PRN)), Disp: 30 tablet, Rfl: 1     Urea 40 % CREA, Apply to feet QHS, Disp: 227 g, Rfl: 11    Allergies -   Allergies   Allergen Reactions     Macrobid [Nitrofuran Derivatives] Shortness Of Breath and Other (See Comments)     High fever     Codeine Other (See Comments) and Nausea and Vomiting     headache       Social History -   Social History     Socioeconomic History     Marital status:    Tobacco Use     Smoking  status: Never     Smokeless tobacco: Never   Substance and Sexual Activity     Alcohol use: Yes     Comment: 2 daily (wine)     Drug use: No     Sexual activity: Yes     Partners: Male     Birth control/protection: Post-menopausal, Female Surgical     Comment: hyst   Other Topics Concern     Parent/sibling w/ CABG, MI or angioplasty before 65F 55M? No       Family History -   Family History   Problem Relation Age of Onset     Cancer Mother         endometrail cancer     Heart Disease Mother      Neurologic Disorder Mother         fibromyalgia     Other Cancer Mother      Diabetes Father      Cancer Father         pancreatic cancer     Colon Cancer Father      Heart Disease Maternal Grandmother      Prostate Cancer Maternal Grandfather      Cancer Paternal Grandfather         lung     Genitourinary Problems Paternal Grandfather         dialysis     Other Cancer Paternal Grandfather      Diabetes Brother      Depression Brother      Anxiety Disorder Brother      Mental Illness Brother      Other Cancer Paternal Grandmother      Cancer Son      Other Cancer Son        Review of Systems - As per HPI and PMHx, otherwise 10+ system review of the head and neck, and general constitution is negative.    Physical Exam  Pulse 86   SpO2 98%     General - The patient is well nourished and well developed, and appears to have good nutritional status.  Alert and oriented to person and place, answers questions and cooperates with examination appropriately.   Head and Face - Normocephalic and atraumatic, with no gross asymmetry noted of the contour of the facial features.  The facial nerve is intact, with strong symmetric movements.  Voice and Breathing - The patient was breathing comfortably without the use of accessory muscles. There was no wheezing, stridor, or stertor.  The patients voice was clear and strong, and had appropriate pitch and quality.  Ears - The tympanic membranes are normal in appearance, bony landmarks are  intact.  No retraction, perforation, or masses.  No fluid or purulence was seen in the external canal or the middle ear. No evidence of infection of the middle ear or external canal, cerumen was normal in appearance.  Eyes - Extraocular movements intact, and the pupils were reactive to light.  Sclera were not icteric or injected, conjunctiva were pink and moist.  Mouth - Examination of the oral cavity showed pink, healthy oral mucosa. No lesions or ulcerations noted.  The tongue was mobile and midline, and the dentition were in good condition.    Throat - The walls of the oropharynx were smooth, pink, moist, symmetric, and had no lesions or ulcerations.  The tonsillar pillars and soft palate were symmetric.  The uvula was midline on elevation.    Neck - Normal midline excursion of the laryngotracheal complex during swallowing.  Full range of motion on passive movement.  Palpation of the occipital, submental, submandibular, internal jugular chain, and supraclavicular nodes did not demonstrate any abnormal lymph nodes or masses.  The carotid pulse was palpable bilaterally.  Palpation of the thyroid was soft and smooth, with no nodules or goiter appreciated.  The trachea was mobile and midline.  Nose - External contour is symmetric, no gross deflection or scars.  Nasal mucosa is pink and moist with no abnormal mucus.  The septum was midline and non-obstructive, turbinates of normal size and position.  No polyps, masses, or purulence noted on examination.    Audiologic Studies - An audiogram and tympanogram were performed today as part of the evaluation and personally reviewed, as well as compared to the most recent test done in January 2020. The tympanogram shows a normal Type A curve, with normal canal volume and middle ear pressure.  There is no sign of eustachian tube dysfunction or middle ear effusion.  The audiogram was unchanged, with a borderline mild sensorineural hearing loss in both ears, up to 2000Hz, where  there is a very small down sloping sensorineural hearing loss in both ears.  No conductive hearing loss, normal Word Recognition      A/P - Ruba Farmer is a 77 year old female  (H90.3) SNHL (sensory-neural hearing loss), asymmetrical  (primary encounter diagnosis)    The hearing has objectively remained stable.  Get another one in 2 years    I have also sent in Dermotic for her eczematous ear canals        Again, thank you for allowing me to participate in the care of your patient.        Sincerely,        Parish Velázquez MD

## 2023-04-21 NOTE — PROGRESS NOTES
AUDIOLOGY REPORT:    Patient was referred to [facility] from ENT by Dr. Velázquez for a hearing examination. Patient is here today to ensure stability of hearing. She had a left sudden sensorineural hearing loss in 2020 and after taking steroids, her hearing returned. Today she denies any significant changes to her hearing, pain, pressure, drainage, dizziness and tinnitus.  She was unaccompanied to today's appointment    Testing:    Otoscopy:   Otoscopic exam indicates ears are clear of cerumen bilaterally     Tympanograms:    RIGHT: normal eardrum mobility     LEFT:   normal eardrum mobility    Reflexes (reported by stimulus ear): 1000 Hz  RIGHT: Ipsilateral is present at normal levels  RIGHT: Contralateral is absent at frequencies tested  LEFT:   Ipsilateral is present at normal levels  LEFT:   Contralateral is present at normal levels    Thresholds:   Pure Tone Thresholds assessed using conventional audiometry with good  reliability from 250-8000 Hz bilaterally using insert earphones and circumaural headphones     RIGHT:  normal sloping to moderate sensorineural hearing loss    LEFT:    normal sloping to moderately-severe sensorineural hearing loss    Speech Reception Threshold:    RIGHT: 20 dB HL    LEFT:   15 dB HL  Speech Reception Thresholds are in good agreement with pure tone thresholds    Word Recognition Score:     RIGHT: 100% at 60 dB HL using NU-6 recorded word list.    LEFT:   100% at 55 dB HL using NU-6 recorded word list.    Discussed results with the patient. When compared to previous audiogram on 1/31/2020, hearing has remained stable with the exception of a 15 dB decline at 8000 Hz in her right ear.     Patient was returned to ENT for follow up.     Spenser Wen, CCC-A  Licensed Audiologist  [place] #[id number]    04/21/23

## 2023-05-17 ENCOUNTER — THERAPY VISIT (OUTPATIENT)
Dept: PHYSICAL THERAPY | Facility: CLINIC | Age: 78
End: 2023-05-17
Attending: FAMILY MEDICINE
Payer: MEDICARE

## 2023-05-17 DIAGNOSIS — N39.41 URGE INCONTINENCE OF URINE: ICD-10-CM

## 2023-05-17 PROCEDURE — 97110 THERAPEUTIC EXERCISES: CPT | Mod: GP | Performed by: PHYSICAL THERAPIST

## 2023-05-17 NOTE — PROGRESS NOTES
05/17/23 0500   Appointment Info   Signing clinician's name / credentials Ai Jones, PT, DPT   Visits Used 4/10 [facility] - PFM   Medical Diagnosis Urge incontinence of urine (N39.41)   PT Tx Diagnosis PFM weakness and increase tone   Quick Adds Certification   Progress Note/Certification   Start of Care Date 03/22/23   Onset of illness/injury or Date of Surgery 03/01/23   Therapy Frequency 1x/week   Predicted Duration 6 weeks   Certification date from 05/18/23   Certification date to 06/28/23   Progress Note Due Date 06/28/23   Progress Note Completed Date 05/17/23   GOALS   PT Goals 2;3;4;5;6   PT Goal 1   Goal Identifier Water Intake   Goal Description Goal Description: Pt will drink approx 80 ox of fluid per day to help w proper hydration and reduce irritation on bladder   Goal Progress 3-4 cups of H20, 1 cup of coffee   Target Date 04/20/23   PT Goal 2   Goal Identifier 3-4 hours   Goal Description Pt will improve emptying of bladder thru improved toileting techniques, and report void times of every 3-4 hours consistently   Goal Progress pt relates can hold for longer, possibly 1-2 hours. Going approx 6 times a day   Target Date 04/20/23   PT Goal 3   Goal Identifier night time euresis (midnight to 730 - am)   Goal Description Pt will relate going to the bathroom 1 time or less during the nigth to demonsrate improved strength   Goal Progress 3 times a night  on average. wakes ups with a stinging feeling. goes to bed at midnight, 2am, 4am, 6am   Target Date 05/18/23   PT Goal 4   Goal Identifier Dry   Goal Description Pt will improve PFM strength to report staying dry 12/14 days   Goal Progress 2/7 for the last week, more leaks when needing to have a BM (going multiple times per day)   Target Date 05/18/23   Subjective Report   Subjective Report Pt has not been doing HEP as her daughter has in the hospital for the past the month. Pt relates kegels have helped her.   Objective Measures   Objective Measures  Objective Measure 2;Objective Measure 1;Objective Measure 3;Objective Measure 4;Objective Measure 5   Objective Measure 1   Objective Measure PERF   Details NT   Objective Measure 2   Objective Measure Hip ROM   Details SLR: 60 deg - remaining hip ROM WFL   Objective Measure 3   Objective Measure MMT   Details hip IR: 5/5 hip ER:4/5  hip abd:3/5   Objective Measure 4   Objective Measure Pelvis   Details standing: R innominate higher than L - stork: - negative,  supine:R posterior innominate rotation   Objective Measure 5   Objective Measure Lumbar ROM   Details flex: to floor ext: 100%   Treatment Interventions (PT)   Interventions Therapeutic Procedure/Exercise;Therapeutic Activity   Therapeutic Procedure/Exercise   Therapeutic Procedures: strength, endurance, ROM, flexibillity minutes (59821) 45   PTRx Ther Proc 1 clams   PTRx Ther Proc 1 - Details x 20   PTRx Ther Proc 2 butterfly stretch   PTRx Ther Proc 2 - Details x 2   PTRx Ther Proc 3 hamstring stretch   PTRx Ther Proc 3 - Details x2   PTRx Ther Proc 4 relaxation techniques - diaphragmatic breathing, submaimxal squeeze, deep squats   PTRx Ther Proc 5 extened time for for objective measures as noted above   PT Developmental Testing   Assessments Completed Pt has been seen for 4 visits over this POC however has not been to therapy in the past month due to daughter being hospitalized. Pt overall relates improvement however continues to describe symptoms of increased tone and not fully empyting. Pt is also having urinary incontinence approx 5/7 days. Pt would benefit from skilled physical therapy services to continue to work on relaxation and reduce incontinence.   Education   Learner/Method Patient   Plan   Homework PTRx   Home program [email or web address]   Plan for next session pelvic exam, thielese pending tightness, biofeedback   Total Session Time   Timed Code Treatment Minutes 45   Total Treatment Time (sum of timed and untimed services) 45   Medicare Claim  Information   Medical Diagnosis Urge incontinence of urine (N39.41)   PT Diagnosis PFM weakness and increase tone   Start of Care Date 03/22/23   Onset date of current episode/exacerbation 03/01/23   Certification date from 03/22/23   Ortho Goal 1   Goal Identifier Water intake   Goal Description Pt will drink approx 80 ox of fluid per day to help w proper hydration and reduce irritation on bladder   Goal Progress has not been good   Target Date 04/20/23   Ortho Goal 2   Goal Identifier 3-4 hours   Goal Description 1) Pt will improve emptying of bladder thru improved toileting techniques, and report void times of every 3-4 hours consistently   Goal Progress able to go 3-4   Target Date 04/20/23   Ortho Goal 3   Goal Identifier night time euresis (midnight to 730 - am)   Goal Description Pt will relate going to the bathroom 1 time or less during the nigth to demonsrate improved strength   Goal Progress 3 times   Target Date 05/18/23   Ortho Goal 4   Goal Identifier dry   Goal Description Pt will improve PFM strength to report staying dry 12/14 days   Target Date 05/18/23   PT Outcome Measures   Outcome Measures Other PT Outcome Measure         [facility]                                                                                   OUTPATIENT PHYSICAL THERAPY    PLAN OF TREATMENT FOR OUTPATIENT REHABILITATION   Patient's Last Name, First Name, ANGELIKARuba Vasquez YOB: 1945   Provider's Name   [facility]   Medical Record No.  [id number]     Onset Date: 03/01/23  Start of Care Date: 03/22/23     Medical Diagnosis:  Urge incontinence of urine (N39.41)      PT Treatment Diagnosis:  PFM weakness and increase tone Plan of Treatment  Frequency/Duration: 1x/week/ 6 weeks    Certification date from 05/18/23 to 06/28/23         See note for plan of treatment details and functional goals     Ai Jones, PT                         I CERTIFY THE  NEED FOR THESE SERVICES FURNISHED UNDER        THIS PLAN OF TREATMENT AND WHILE UNDER MY CARE     (Physician attestation of this document indicates review and certification of the therapy plan).                Referring Provider:  Xiao Dasilva      Initial Assessment  See Epic Evaluation- Start of Care Date: 03/22/23            PLAN  Continue therapy per current plan of care.    Beginning/End Dates of Progress Note Reporting Period:  Eval to 05/17/2023    Referring Provider:  Xiao Dasilva

## 2023-05-24 ENCOUNTER — THERAPY VISIT (OUTPATIENT)
Dept: PHYSICAL THERAPY | Facility: CLINIC | Age: 78
End: 2023-05-24
Attending: FAMILY MEDICINE
Payer: MEDICARE

## 2023-05-24 DIAGNOSIS — N39.41 URGE INCONTINENCE OF URINE: Primary | ICD-10-CM

## 2023-05-24 PROCEDURE — 90912 BFB TRAINING 1ST 15 MIN: CPT | Mod: GP | Performed by: PHYSICAL THERAPIST

## 2023-05-24 PROCEDURE — 97110 THERAPEUTIC EXERCISES: CPT | Mod: GP | Performed by: PHYSICAL THERAPIST

## 2023-05-24 PROCEDURE — 90913 BFB TRAINING EA ADDL 15 MIN: CPT | Mod: GP | Performed by: PHYSICAL THERAPIST

## 2023-06-06 ENCOUNTER — NURSE TRIAGE (OUTPATIENT)
Dept: FAMILY MEDICINE | Facility: CLINIC | Age: 78
End: 2023-06-06
Payer: MEDICARE

## 2023-06-06 NOTE — TELEPHONE ENCOUNTER
"S-(situation): Pt found a tick on her right leg last night at about 10:30 pm, thinks it was a deer tick.    B-(background): Pt reports history of Lyme disease. Unsure how long the tick was attached, but doesn't think it was on for extended time period. Tick wasn't engorged.     A-(assessment): See triage assessment below for more information.     R-(recommendations): Per triage protocol recommendation, pt was advised to be seen by provider within 24 hours. Appt scheduled for tomorrow, and advised that she be seen sooner in UC/ED with any new or worsening symptoms; she verbalized understanding.     Reason for Disposition    [1] Red or very tender (to touch) area AND [2] started over 24 hours after the bite    Additional Information    Negative: [1] 2 to 14 days following tick bite AND [2] severe headache with fever occurs    Negative: [1] 2 to 14 days following tick bite AND [2] widespread rash with fever occurs    Negative: Patient sounds very sick or weak to the triager    Negative: [1] Fever AND [2] spreading red area or streak    Negative: [1] Fever AND [2] area is very tender to touch    Negative: [1] Red streak or red line AND [2] length > 2 inches (5 cm)    Negative: Can't remove live tick (after trying Care Advice)    Negative: Sounds like a life-threatening emergency to the triager    Negative: Not a tick bite    Negative: [1] 2 to 14 days following tick bite AND [2] fever AND [3] no rash or headache    Negative: [1] 2 to 14 days following tick bite AND [2] widespread rash or headache AND [3] no fever    Answer Assessment - Initial Assessment Questions  1. TYPE of TICK: \"Is it a wood tick or a deer tick?\" (e.g., deer tick, wood tick; unsure)      Pt thinks it was a deer tick, says it was small and brown  2. SIZE of TICK: \"How big is the tick?\" (e.g., size of poppy seed, apple seed, watermelon seed; unsure) Note: Deer ticks can be the size of a poppy seed (nymph) or an apple seed (adult).        A little " "smaller than apple seed  3. ENGORGED: \"Did the tick look flat or engorged (full, swollen)?\" (e.g., flat, engorged; unsure)      Flat   4. LOCATION: \"Where is the tick bite located?\"       Right lower leg, just below knee  5. ONSET: \"How long do you think the tick was attached before you removed it?\" (e.g., 5 hours, 2 days)       Pt unsure, but doesn't think it was on there long. Says that she found it at about 10:30pm last night 6/5/23  6. APPEARANCE of BITE or RASH: \"What does the site look like?\"      Denies bulls-eye rash. Says that the area is reddened, about half-dollar size, and hard around the bite. She says that it has gotten bigger since she found it yesterday.   7. PREGNANCY: \"Is there any chance you are pregnant?\" \"When was your last menstrual period?\"      N/A    Protocols used: TICK BITE-CRISTINA-    Arin Cheek RN  [facility]  "

## 2023-06-07 ENCOUNTER — OFFICE VISIT (OUTPATIENT)
Dept: FAMILY MEDICINE | Facility: CLINIC | Age: 78
End: 2023-06-07
Payer: MEDICARE

## 2023-06-07 VITALS
HEART RATE: 78 BPM | SYSTOLIC BLOOD PRESSURE: 126 MMHG | RESPIRATION RATE: 14 BRPM | TEMPERATURE: 98.5 F | DIASTOLIC BLOOD PRESSURE: 68 MMHG | OXYGEN SATURATION: 98 % | BODY MASS INDEX: 30.05 KG/M2 | HEIGHT: 66 IN | WEIGHT: 187 LBS

## 2023-06-07 DIAGNOSIS — S80.862A TICK BITE OF LEFT LOWER LEG, INITIAL ENCOUNTER: Primary | ICD-10-CM
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
## 2023-06-07 PROCEDURE — 99213 OFFICE O/P EST LOW 20 MIN: CPT | Performed by: PHYSICIAN ASSISTANT

## 2023-06-07 RX ORDER — AZITHROMYCIN 250 MG/1
TABLET, FILM COATED ORAL
Qty: 6 TABLET | Refills: 10 | COMMUNITY
Start: 2023-06-07 | End: 2023-08-09

## 2023-06-07 RX ORDER — DOXYCYCLINE 100 MG/1
200 CAPSULE ORAL ONCE
Qty: 2 CAPSULE | Refills: 0 | Status: SHIPPED | OUTPATIENT
Start: 2023-06-07 | End: 2023-06-07

## 2023-06-07 RX ORDER — SULFAMETHOXAZOLE/TRIMETHOPRIM 800-160 MG
TABLET ORAL
Qty: 30 TABLET | Refills: 1 | Status: SHIPPED | OUTPATIENT
Start: 2023-06-07 | End: 2023-09-08

## 2023-06-07 ASSESSMENT — PAIN SCALES - GENERAL: PAINLEVEL: NO PAIN (0)

## 2023-06-07 NOTE — PROGRESS NOTES
"  Assessment & Plan   Tick bite of left lower leg, initial encounter  Patient reports tick bite from 2 days ago after walking on her land. She is unsure how long it was attached. The tick was not deeply burrowed or engorged. Shortly after she removed the tick, there was an area of redness surrounding the bite which has grown in size (~3x3 cm). She endorses pruritus. No erythema migrans seen on exam, no warmth. She denies any fevers, chills, nausea, or vomiting. Will treat with prophylactic dose of Doxycycline x1. Pt will follow-up on MyChart if she develops symptoms further and we can treat for a longer duration.   - doxycycline hyclate (VIBRAMYCIN) 100 MG capsule; Take 2 capsules (200 mg) by mouth once for 1 dose     BMI:   Estimated body mass index is 30.18 kg/m  as calculated from the following:    Height as of this encounter: 1.676 m (5' 6\").    Weight as of this encounter: 84.8 kg (187 lb).     MARCO Taylor-S2    Cj Mercer PA-C  [facility]    Dhaval Vidal is a 77 year old, presenting for the following health issues:  Insect Bites        6/7/2023     3:22 PM   Additional Questions   Roomed by SMA Johan     History of Present Illness       Reason for visit:  Tick bite  Symptom onset:  1-3 days ago  Symptoms include:  Red itch  Symptom intensity:  Moderate  Symptom progression:  Staying the same  Had these symptoms before:  Yes  Has tried/received treatment for these symptoms:  Yes  Previous treatment was successful:  Yes  Prior treatment description:  Doxy    She eats 2-3 servings of fruits and vegetables daily.She consumes 1 sweetened beverage(s) daily.She exercises with enough effort to increase her heart rate 30 to 60 minutes per day.  She exercises with enough effort to increase her heart rate 6 days per week.   She is taking medications regularly.       Rash/Insect Bite (tick)  Onset/Duration: Monday evening  Description  Location: Lower interior left " "leg  Character: round, raised, red  Itching: moderate  Intensity:  moderate  Progression of Symptoms:  worsening  Accompanying signs and symptoms:   Fever: No  Body aches or joint pain: No  Sore throat symptoms: No  Recent cold symptoms: No  History:           Previous episodes of similar rash: Yes, has had tick bites before  New exposures:  Yes, Monday evening (tick bite)    Review of Systems   See HPI.       Objective    /68 (BP Location: Right arm, Patient Position: Sitting, Cuff Size: Adult Large)   Pulse 78   Temp 98.5  F (36.9  C) (Tympanic)   Resp 14   Ht 1.676 m (5' 6\")   Wt 84.8 kg (187 lb)   SpO2 98%   BMI 30.18 kg/m    Body mass index is 30.18 kg/m .  Physical Exam   GENERAL APPEARANCE: healthy, alert and no distress  SKIN: 3x3 cm area of erythema on left medial leg with obvious bite site    Physician Attestation   I, Cj Mercer PA-C, was present with the medical/MADELYN student who participated in the service and in the documentation of the note.  I have verified the history and personally performed the physical exam and medical decision making.  I agree with the assessment and plan of care as documented in the note.      Cj Mercer PA-C            "

## 2023-06-13 ENCOUNTER — THERAPY VISIT (OUTPATIENT)
Dept: PHYSICAL THERAPY | Facility: CLINIC | Age: 78
End: 2023-06-13
Attending: FAMILY MEDICINE
Payer: MEDICARE

## 2023-06-13 DIAGNOSIS — N39.41 URGE INCONTINENCE OF URINE: Primary | ICD-10-CM

## 2023-06-13 PROCEDURE — 97530 THERAPEUTIC ACTIVITIES: CPT | Mod: GP | Performed by: PHYSICAL THERAPIST

## 2023-06-13 PROCEDURE — 97110 THERAPEUTIC EXERCISES: CPT | Mod: GP | Performed by: PHYSICAL THERAPIST

## 2023-06-13 NOTE — PROGRESS NOTES
[facility]     Outpatient Physical Therapy Discharge Note         06/13/23 0500   Appointment Info   Signing clinician's name / credentials Ai Jones, PT, DPT   Visits Used 6/10 [facility] - PFM   Medical Diagnosis Urge incontinence of urine (N39.41)   PT Tx Diagnosis PFM weakness and increase tone   Quick Adds Certification   Progress Note/Certification   Start of Care Date 03/22/23   Onset of illness/injury or Date of Surgery 03/01/23   Therapy Frequency 1x/week   Predicted Duration 6 weeks   Certification date from 05/18/23   Certification date to 06/28/23   Progress Note Due Date 06/28/23   Progress Note Completed Date 05/17/23   GOALS   PT Goals 2;3;4;5;6   PT Goal 1   Goal Identifier Water Intake   Goal Description Goal Description: Pt will drink approx 80 ox of fluid per day to help w proper hydration and reduce irritation on bladder   Goal Progress improving   Target Date 04/20/23   PT Goal 2   Goal Identifier 3-4 hours   Goal Description Pt will improve emptying of bladder thru improved toileting techniques, and report void times of every 3-4 hours consistently   Goal Progress pt relates can hold for longer, possibly 1-2 hours. Going approx 6 times a day   Target Date 04/20/23   PT Goal 3   Goal Identifier night time euresis (midnight to 730 - am)   Goal Description Pt will relate going to the bathroom 1 time or less during the nigth to demonsrate improved strength   Goal Progress irregular schedule sometimes 2x/sometimes every hour   Target Date 05/18/23   PT Goal 4   Goal Identifier Dry   Goal Description Pt will improve PFM strength to report staying dry 12/14 days   Goal Progress 4/7 for the last week, more leaks when needing to have a BM (going multiple times per day)   Target Date 05/18/23   Subjective Report   Subjective Report Pt relates unable to come to therapy due to daughter in hospital. Pt didnt  have a tone time for HEP with that. Pt relates trying to more.   Objective Measures   Objective Measures Objective Measure 2;Objective Measure 1;Objective Measure 3;Objective Measure 4;Objective Measure 5   Objective Measure 1   Objective Measure PERF   Details NT   Objective Measure 2   Objective Measure Hip ROM   Objective Measure 3   Objective Measure MMT   Details hip IR: 5/5 hip ER:4/5  hip abd:3/5   Objective Measure 4   Objective Measure Pelvis   Details AUA: 10   Objective Measure 5   Objective Measure Lumbar ROM   Details flex: to floor ext: 100%   PT Modalities   PT Modalities Biofeedback PFM   Treatment Interventions (PT)   Interventions Therapeutic Procedure/Exercise;Therapeutic Activity   Therapeutic Procedure/Exercise   Therapeutic Procedures: strength, endurance, ROM, flexibillity minutes (54130) 10   Ther Proc 1 bird dog w kegel   PTRx Ther Proc 1 wall squat w kege   PTRx Ther Proc 1 - Details x 10   PTRx Ther Proc 2 Reviewed stretches/HEP to continue at home   Ther Proc 1 - Details x 10   Therapeutic Activity   Therapeutic Activities: dynamic activities to improve functional performance minutes (29649) 16   Treatment Detail education   Progress relates understanding   Ther Act 1 Education/reveiwed strategies to control the urge, retraining. edu on night time toileting and disuccsed ways to fully empty   Education   Learner/Method Patient   Plan   Homework PTRx   Home program [id number]   Plan for next session thielese pending tightness, biofeedback   Total Session Time   Timed Code Treatment Minutes 26   Total Treatment Time (sum of timed and untimed services) 26   Medicare Claim Information   Medical Diagnosis Urge incontinence of urine (N39.41)   PT Diagnosis PFM weakness and increase tone   Start of Care Date 03/22/23   Onset date of current episode/exacerbation 03/01/23   Certification date from 03/22/23   Ortho Goal 1   Goal Identifier Water intake   Goal Description Pt will drink approx 80 ox  of fluid per day to help w proper hydration and reduce irritation on bladder   Goal Progress has not been good   Target Date 04/20/23   Ortho Goal 2   Goal Identifier 3-4 hours   Goal Description 1) Pt will improve emptying of bladder thru improved toileting techniques, and report void times of every 3-4 hours consistently   Goal Progress able to go 3-4   Target Date 04/20/23   Ortho Goal 3   Goal Identifier night time euresis (midnight to 730 - am)   Goal Description Pt will relate going to the bathroom 1 time or less during the nigth to demonsrate improved strength   Goal Progress 3 times   Target Date 05/18/23   Ortho Goal 4   Goal Identifier dry   Goal Description Pt will improve PFM strength to report staying dry 12/14 days   Target Date 05/18/23   PT Outcome Measures   Outcome Measures Other PT Outcome Measure         DISCHARGE  Reason for Discharge: Able to continue HEP at home    Equipment Issued: NA    Discharge Plan: Patient to continue home program.    Referring Provider:  Xiao Dasilva

## 2023-06-27 ENCOUNTER — VIRTUAL VISIT (OUTPATIENT)
Dept: FAMILY MEDICINE | Facility: CLINIC | Age: 78
End: 2023-06-27
Payer: MEDICARE

## 2023-06-27 DIAGNOSIS — R10.30 LOWER ABDOMINAL PAIN: ICD-10-CM

## 2023-06-27 DIAGNOSIS — R19.7 DIARRHEA, UNSPECIFIED TYPE: Primary | ICD-10-CM

## 2023-06-27 PROCEDURE — 99213 OFFICE O/P EST LOW 20 MIN: CPT | Mod: VID | Performed by: NURSE PRACTITIONER

## 2023-06-27 ASSESSMENT — ENCOUNTER SYMPTOMS: DIARRHEA: 1

## 2023-06-27 NOTE — PATIENT INSTRUCTIONS
Diarrhea, unspecified type  Lower abdominal pain  Patient has had diarrhea for approximately 2 weeks, more watery and pasty.  Denies any blood in stool and intermittent lower abdominal cramping.  Patient has recently been on antibiotics, spent several days in the hospital with her daughter and daughter currently has C. difficile.  Patient denies any nausea or vomiting.  Patient also reports there was a recall of frozen fruit that she had had recently as well.  Recommend stool studies including C. difficile.  Encourage patient to increase fluid intake, rest and return stool studies as soon as possible for testing.  Advised against any Imodium until testing is complete.  If symptoms significantly worsen advised patient to be seen in clinic.  - C. difficile Toxin B PCR with reflex to C. difficile Antigen and Toxins A/B EIA; Future  - Enteric Bacteria and Virus Panel by [facility] Stool; Future  - Ova and Parasite Exam Routine; Future

## 2023-06-27 NOTE — PROGRESS NOTES
"Marcy is a 78 year old who is being evaluated via a billable video visit.      How would you like to obtain your AVS? MyChart  If the video visit is dropped, the invitation should be resent by: Text to cell phone: [phone]  Will anyone else be joining your video visit? No          Assessment & Plan     Diarrhea, unspecified type  Lower abdominal pain  Patient has had diarrhea for approximately 2 weeks, more watery and pasty.  Denies any blood in stool and intermittent lower abdominal cramping.  Patient has recently been on antibiotics, spent several days in the hospital with her daughter and daughter currently has C. difficile.  Patient denies any nausea or vomiting.  Patient also reports there was a recall of frozen fruit that she had had recently as well.  Recommend stool studies including C. difficile.  Encourage patient to increase fluid intake, rest and return stool studies as soon as possible for testing.  Advised against any Imodium until testing is complete.  If symptoms significantly worsen advised patient to be seen in clinic.  - C. difficile Toxin B PCR with reflex to C. difficile Antigen and Toxins A/B EIA; Future  - Enteric Bacteria and Virus Panel by [facility] Stool; Future  - Ova and Parasite Exam Routine; Future             BMI:   Estimated body mass index is 30.18 kg/m  as calculated from the following:    Height as of 6/7/23: 1.676 m (5' 6\").    Weight as of 6/7/23: 84.8 kg (187 lb).   Weight management plan: Patient was referred to their PCP to discuss a diet and exercise plan.    See Patient Instructions    Susi Jackson, ANDREW, APRN-CNP   [facility]    Subjective   Marcy is a 78 year old, presenting for the following health issues:  Diarrhea (/)        6/27/2023     1:38 PM   Additional Questions   Roomed by Ivelisse SWAN     Diarrhea  Onset/Duration: about 2 weeks   Description:       Consistency of stool: watery and pasty       Blood in stool: No       Number of " loose stools past 24 hours: 3  Progression of Symptoms: same  Accompanying signs and symptoms:       Fever: No       Nausea/Vomiting: No       Abdominal pain: No - few little cramps here or there - lower abdomen        Weight loss: No       Episodes of constipation: No  History   Ill contacts: YES- daughter who had c-diff  Recent use of antibiotics: About a month ago she had been on zpak. Shortly after that had 2 doses of doxycycline due to a tick bite   Recent travels: No  Recent medication-new or changes(Rx or OTC): No  Precipitating or alleviating factors: None  Therapies tried and outcome: none     [employer] also had a recall for a frozen fruit that she had    Review of Systems   Constitutional, HEENT, cardiovascular, pulmonary, gi and gu systems are negative, except as otherwise noted.      Objective    Vitals - Patient Reported  Pain Score: No Pain (0)        Physical Exam   GENERAL: Healthy, alert and no distress  EYES: Eyes grossly normal to inspection.  No discharge or erythema, or obvious scleral/conjunctival abnormalities.  RESP: No audible wheeze, cough, or visible cyanosis.  No visible retractions or increased work of breathing.    SKIN: Visible skin clear. No significant rash, abnormal pigmentation or lesions.  NEURO: Cranial nerves grossly intact.  Mentation and speech appropriate for age.  PSYCH: Mentation appears normal, affect normal/bright, judgement and insight intact, normal speech and appearance well-groomed.    Diagnostic Test Results:  Pending            Video-Visit Details    Type of service:  Video Visit   Video Start Time: 2:16 PM  Video End Time:2:27 PM    Originating Location (pt. Location): Home    Distant Location (provider location):  Off-site  Platform used for Video Visit: Cell[employer]    Chart documentation with Dragon Voice recognition Software. Although reviewed after completion, some words and grammatical errors may remain.

## 2023-06-28 LAB
C COLI+JEJUNI+LARI FUSA STL QL NAA+PROBE: NOT DETECTED
C DIFF TOX B STL QL: NEGATIVE
EC STX1 GENE STL QL NAA+PROBE: NOT DETECTED
EC STX2 GENE STL QL NAA+PROBE: NOT DETECTED
NOROV GI+II ORF1-ORF2 JNC STL QL NAA+PR: NOT DETECTED
RVA NSP5 STL QL NAA+PROBE: NOT DETECTED
SALMONELLA SP RPOD STL QL NAA+PROBE: NOT DETECTED
SHIGELLA SP+EIEC IPAH STL QL NAA+PROBE: NOT DETECTED
V CHOL+PARA RFBL+TRKH+TNAA STL QL NAA+PR: NOT DETECTED
Y ENTERO RECN STL QL NAA+PROBE: NOT DETECTED

## 2023-06-28 PROCEDURE — 87177 OVA AND PARASITES SMEARS: CPT | Performed by: NURSE PRACTITIONER

## 2023-06-28 PROCEDURE — 87209 SMEAR COMPLEX STAIN: CPT | Performed by: NURSE PRACTITIONER

## 2023-06-28 PROCEDURE — 87506 IADNA-DNA/RNA PROBE TQ 6-11: CPT | Performed by: NURSE PRACTITIONER

## 2023-06-28 PROCEDURE — 87493 C DIFF AMPLIFIED PROBE: CPT | Mod: 59 | Performed by: NURSE PRACTITIONER

## 2023-06-29 LAB — O+P STL MICRO: NEGATIVE

## 2023-08-09 ENCOUNTER — OFFICE VISIT (OUTPATIENT)
Dept: FAMILY MEDICINE | Facility: CLINIC | Age: 78
End: 2023-08-09
Payer: MEDICARE

## 2023-08-09 VITALS
HEART RATE: 69 BPM | SYSTOLIC BLOOD PRESSURE: 130 MMHG | WEIGHT: 195 LBS | RESPIRATION RATE: 16 BRPM | BODY MASS INDEX: 28.88 KG/M2 | TEMPERATURE: 98.1 F | HEIGHT: 69 IN | DIASTOLIC BLOOD PRESSURE: 82 MMHG

## 2023-08-09 DIAGNOSIS — M79.671 BILATERAL FOOT PAIN: Primary | ICD-10-CM

## 2023-08-09 DIAGNOSIS — M79.672 BILATERAL FOOT PAIN: Primary | ICD-10-CM

## 2023-08-09 DIAGNOSIS — Z12.11 SCREEN FOR COLON CANCER: ICD-10-CM

## 2023-08-09 PROCEDURE — 99214 OFFICE O/P EST MOD 30 MIN: CPT | Performed by: NURSE PRACTITIONER

## 2023-08-09 RX ORDER — TOPIRAMATE 25 MG/1
25 TABLET, FILM COATED ORAL DAILY
Qty: 30 TABLET | Refills: 0 | Status: SHIPPED | OUTPATIENT
Start: 2023-08-09 | End: 2023-10-30

## 2023-08-09 RX ORDER — PHENTERMINE HYDROCHLORIDE 15 MG/1
15 CAPSULE ORAL EVERY MORNING
Qty: 30 CAPSULE | Refills: 0 | Status: SHIPPED | OUTPATIENT
Start: 2023-08-09 | End: 2023-09-08

## 2023-08-09 ASSESSMENT — PAIN SCALES - GENERAL: PAINLEVEL: SEVERE PAIN (6)

## 2023-08-09 NOTE — PROGRESS NOTES
Assessment & Plan     Bilateral foot pain  With new symptoms recommend monitoring at this time.  Rest, ice, elevated and take antiinflammatories as needed.  Symptomatic care and follow up discussed.    BMI 28.0-28.9,adult  Struggling to lose weight which is negatively impacting joint pain, interested in a trial of phentermine for symptoms.  Risks and benefits of medication discussed. Marcy verbalized understanding, has follow up with PCP scheduled in a month.  - phentermine (ADIPEX-P) 15 MG capsule; Take 1 capsule (15 mg) by mouth every morning  - topiramate (TOPAMAX) 25 MG tablet; Take 1 tablet (25 mg) by mouth daily    Screen for colon cancer    - Colonoscopy Screening  Referral; Future      SANJUANITA James CNP  [facility]    Subjective   Marcy is a 78 year old, presenting for the following health issues:  Musculoskeletal Problem and Wart        8/9/2023     8:23 AM   Additional Questions   Roomed by Cecile       History of Present Illness       Reason for visit:  Painful ankle/foot - bilateral  Symptom onset:  1-2 weeks ago  Symptoms include:  Pain- front of foot , swelling,  Symptom intensity:  Moderate  Symptom progression:  Staying the same  Had these symptoms before:  Yes (sometimes after wearing different shoes. Did do some different exercises recently)  Has tried/received treatment for these symptoms:  No  What makes it worse:  Standing walking after sitting  What makes it better:  Ibu ice    She eats 2-3 servings of fruits and vegetables daily.She consumes 1 sweetened beverage(s) daily.She exercises with enough effort to increase her heart rate 20 to 29 minutes per day.  She exercises with enough effort to increase her heart rate 4 days per week.   She is taking medications regularly.     Wart - Right pointer finger. No prior treatment         Review of Systems   Constitutional, HEENT, cardiovascular, pulmonary, gi and gu systems are negative, except as otherwise  "noted.      Objective    /82 (Cuff Size: Adult Large)   Pulse 69   Temp 98.1  F (36.7  C) (Tympanic)   Resp 16   Ht 1.753 m (5' 9\")   Wt 88.5 kg (195 lb)   PF 96 L/min   BMI 28.80 kg/m    Body mass index is 28.8 kg/m .  Physical Exam   GENERAL: healthy, alert and no distress  RESP: lungs clear to auscultation - no rales, rhonchi or wheezes  CV: regular rate and rhythm, normal S1 S2, no S3 or S4, no murmur, click or rub, no peripheral edema and peripheral pulses strong  MS: no gross musculoskeletal defects noted, no edema, tenderness to palpation dorsal left foot  PSYCH: mentation appears normal, affect normal/bright                "

## 2023-08-09 NOTE — PATIENT INSTRUCTIONS
Start the phentermine and Topiramate in the morning    Recheck in 1 month, sooner with any problems    Rest, ice, elevated and take antiinflammatories for the feet

## 2023-08-10 ENCOUNTER — OFFICE VISIT (OUTPATIENT)
Dept: DERMATOLOGY | Facility: CLINIC | Age: 78
End: 2023-08-10
Payer: MEDICARE

## 2023-08-10 DIAGNOSIS — L81.4 LENTIGO: ICD-10-CM

## 2023-08-10 DIAGNOSIS — L82.1 SEBORRHEIC KERATOSIS: ICD-10-CM

## 2023-08-10 DIAGNOSIS — R20.2 NOTALGIA PARESTHETICA: Primary | ICD-10-CM

## 2023-08-10 DIAGNOSIS — L82.0 INFLAMED SEBORRHEIC KERATOSIS: ICD-10-CM

## 2023-08-10 DIAGNOSIS — L57.0 ACTINIC KERATOSIS: ICD-10-CM

## 2023-08-10 DIAGNOSIS — D18.01 ANGIOMA OF SKIN: ICD-10-CM

## 2023-08-10 DIAGNOSIS — D22.9 NEVUS: ICD-10-CM

## 2023-08-10 DIAGNOSIS — B07.9 VIRAL WARTS, UNSPECIFIED TYPE: ICD-10-CM

## 2023-08-10 PROCEDURE — 17110 DESTRUCTION B9 LES UP TO 14: CPT | Performed by: PHYSICIAN ASSISTANT

## 2023-08-10 PROCEDURE — 99213 OFFICE O/P EST LOW 20 MIN: CPT | Mod: 25 | Performed by: PHYSICIAN ASSISTANT

## 2023-08-10 PROCEDURE — 17000 DESTRUCT PREMALG LESION: CPT | Mod: 59 | Performed by: PHYSICIAN ASSISTANT

## 2023-08-10 NOTE — PROGRESS NOTES
HPI:   Chief complaints: Ruba Farmer is a pleasant 78 year old female who presents for Full skin cancer screening to rule out skin cancer   Last Skin Exam: 9 mo ago      1st Baseline: yes  Personal HX of Skin Cancer: no   Personal HX of Malignant Melanoma: no   Family HX of Skin Cancer / Malignant Melanoma: no  Personal HX of Atypical Moles:   no  Risk factors: history of sun exposure and burns  New / Changing lesions: yes scaly spot on the left nose beneath previously treated spot  Social History: Daughter having chemo for breast CA currently; has Li Fraumeni syndrome. Both her  and 10 yo son passed from cancer.   On review of systems, there are no further skin complaints, patient is feeling otherwise well.   ROS of the following were done and are negative: Constitutional, Eyes, Ears, Nose,   Mouth, Throat, Cardiovascular, Respiratory, GI, Genitourinary, Musculoskeletal,   Psychiatric, Endocrine, Allergic/Immunologic.    PHYSICAL EXAM:   There were no vitals taken for this visit.  Skin exam performed as follows: Type 2 skin. Mood appropriate  Alert and Oriented X 3. Well developed, well nourished in no distress.  General appearance: Normal  Head including face: Normal  Eyes: conjunctiva and lids: Normal  Mouth: Lips, teeth, gums: Normal  Neck: Normal  Chest-breast/axillae: Normal  Back: Normal  Spleen and liver: Normal  Cardiovascular: Exam of peripheral vascular system by observation for swelling, varicosities, edema: Normal  Genitalia: groin, buttocks: Normal  Extremities: digits/nails (clubbing): Normal  Eccrine and Apocrine glands: Normal  Right upper extremity: Normal  Left upper extremity: Normal  Right lower extremity: Normal  Left lower extremity: Normal  Skin: Scalp and body hair: See below    Pt deferred exam of breasts, groin, buttocks: No    Other physical findings:  1. Multiple pigmented macules on extremities and trunk  2. Multiple pigmented macules on face, trunk and extremities  3.  Multiple vascular papules on trunk, arms and legs  4. Multiple scattered keratotic plaques  5. Pink gritty papule on the left nasal side wall x 1  6. Depigmented plaques on the back, arms, legs  7. Inflamed keratotic papules on the right inframammary abdomen x 6  8. Verrucous papule on the left 2nd digit x 1         Except as noted above, no other signs of skin cancer or melanoma.     ASSESSMENT/PLAN:   Benign Full skin cancer screening today. . Patient with history of none  Advised on monthly self exams and 1 year  Patient Education: Appropriate brochures given.    Multiple benign appearing melanocytic nevi on arms, legs and trunk. Discussed ABCDEs of melanoma and sunscreen.   Multiple lentigos on arms, legs and trunk. Advised benign, no treatment needed.  Multiple scattered angiomas. Advised benign, no treatment needed.   Seborrheic keratosis on arms, legs and trunk. Advised benign, no treatment needed.  Actinic keratosis on the left nasal side wall x 1. As precancerous, cryosurgery performed. Advised on blistering and post-op care. Advised if not resolved in 1-2 months to return for evaluation  Vitiligo - not bothersome to her; no treatment needed  Inflamed seborrheic keratosis on the right inframammary abdomen x 6. As physically tender cryosurgery performed. Advised on post op care.   Wart on the left 2nd digit x 1. Irritated per patient so cryosurgery performed. Advised on blistering and post op care.   Notalgia paresthetica  --Start gabapentin/amitriptyline/lidocaine compound BID TID PRN                Follow-up: yearly/PRN sooner    1.) Patient was asked about new and changing moles. YES  2.) Patient received a complete physical skin examination: YES  3.) Patient was counseled to perform a monthly self skin examination: YES  Scribed By: Evelyne Hooper, MS, PAFERNANDO

## 2023-08-10 NOTE — LETTER
8/10/2023         RE: Ruba Farmer  [address] [facility] [id number]        Dear Colleague,    Thank you for referring your patient, Ruba Farmer, to the [facility]. Please see a copy of my visit note below.    HPI:   Chief complaints: Ruba Farmer is a pleasant 78 year old female who presents for Full skin cancer screening to rule out skin cancer   Last Skin Exam: 9 mo ago      1st Baseline: yes  Personal HX of Skin Cancer: no   Personal HX of Malignant Melanoma: no   Family HX of Skin Cancer / Malignant Melanoma: no  Personal HX of Atypical Moles:   no  Risk factors: history of sun exposure and burns  New / Changing lesions: yes scaly spot on the left nose beneath previously treated spot  Social History: Daughter having chemo for breast CA currently; has Li Fraumeni syndrome. Both her  and 12 yo son passed from cancer.   On review of systems, there are no further skin complaints, patient is feeling otherwise well.   ROS of the following were done and are negative: Constitutional, Eyes, Ears, Nose,   Mouth, Throat, Cardiovascular, Respiratory, GI, Genitourinary, Musculoskeletal,   Psychiatric, Endocrine, Allergic/Immunologic.    PHYSICAL EXAM:   There were no vitals taken for this visit.  Skin exam performed as follows: Type 2 skin. Mood appropriate  Alert and Oriented X 3. Well developed, well nourished in no distress.  General appearance: Normal  Head including face: Normal  Eyes: conjunctiva and lids: Normal  Mouth: Lips, teeth, gums: Normal  Neck: Normal  Chest-breast/axillae: Normal  Back: Normal  Spleen and liver: Normal  Cardiovascular: Exam of peripheral vascular system by observation for swelling, varicosities, edema: Normal  Genitalia: groin, buttocks: Normal  Extremities: digits/nails (clubbing): Normal  Eccrine and Apocrine glands: Normal  Right upper extremity: Normal  Left upper extremity: Normal  Right lower extremity: Normal  Left lower extremity:  Normal  Skin: Scalp and body hair: See below    Pt deferred exam of breasts, groin, buttocks: No    Other physical findings:  1. Multiple pigmented macules on extremities and trunk  2. Multiple pigmented macules on face, trunk and extremities  3. Multiple vascular papules on trunk, arms and legs  4. Multiple scattered keratotic plaques  5. Pink gritty papule on the left nasal side wall x 1  6. Depigmented plaques on the back, arms, legs  7. Inflamed keratotic papules on the right inframammary abdomen x 6  8. Verrucous papule on the left 2nd digit x 1         Except as noted above, no other signs of skin cancer or melanoma.     ASSESSMENT/PLAN:   Benign Full skin cancer screening today. . Patient with history of none  Advised on monthly self exams and 1 year  Patient Education: Appropriate brochures given.    Multiple benign appearing melanocytic nevi on arms, legs and trunk. Discussed ABCDEs of melanoma and sunscreen.   Multiple lentigos on arms, legs and trunk. Advised benign, no treatment needed.  Multiple scattered angiomas. Advised benign, no treatment needed.   Seborrheic keratosis on arms, legs and trunk. Advised benign, no treatment needed.  Actinic keratosis on the left nasal side wall x 1. As precancerous, cryosurgery performed. Advised on blistering and post-op care. Advised if not resolved in 1-2 months to return for evaluation  Vitiligo - not bothersome to her; no treatment needed  Inflamed seborrheic keratosis on the right inframammary abdomen x 6. As physically tender cryosurgery performed. Advised on post op care.   Wart on the left 2nd digit x 1. Irritated per patient so cryosurgery performed. Advised on blistering and post op care.   Notalgia paresthetica  --Start gabapentin/amitriptyline/lidocaine compound BID TID PRN                Follow-up: yearly/PRN sooner    1.) Patient was asked about new and changing moles. YES  2.) Patient received a complete physical skin examination: YES  3.) Patient was  counseled to perform a monthly self skin examination: YES  Scribed By: Evelyne Hooper, MS, PARaulC      Again, thank you for allowing me to participate in the care of your patient.        Sincerely,        LEO TavarezC

## 2023-09-08 ENCOUNTER — OFFICE VISIT (OUTPATIENT)
Dept: FAMILY MEDICINE | Facility: CLINIC | Age: 78
End: 2023-09-08
Payer: MEDICARE

## 2023-09-08 VITALS
BODY MASS INDEX: 30.37 KG/M2 | TEMPERATURE: 98.4 F | OXYGEN SATURATION: 98 % | DIASTOLIC BLOOD PRESSURE: 72 MMHG | RESPIRATION RATE: 16 BRPM | WEIGHT: 189 LBS | HEIGHT: 66 IN | HEART RATE: 74 BPM | SYSTOLIC BLOOD PRESSURE: 112 MMHG

## 2023-09-08 DIAGNOSIS — F41.9 ANXIETY: ICD-10-CM

## 2023-09-08 DIAGNOSIS — E66.3 OVERWEIGHT: ICD-10-CM

## 2023-09-08 DIAGNOSIS — R05.3 CHRONIC COUGH: Primary | ICD-10-CM

## 2023-09-08 DIAGNOSIS — Z12.11 SCREEN FOR COLON CANCER: ICD-10-CM

## 2023-09-08 PROCEDURE — G0008 ADMIN INFLUENZA VIRUS VAC: HCPCS | Performed by: FAMILY MEDICINE

## 2023-09-08 PROCEDURE — 90662 IIV NO PRSV INCREASED AG IM: CPT | Performed by: FAMILY MEDICINE

## 2023-09-08 PROCEDURE — 99214 OFFICE O/P EST MOD 30 MIN: CPT | Mod: 25 | Performed by: FAMILY MEDICINE

## 2023-09-08 RX ORDER — LORAZEPAM 0.5 MG/1
0.5 TABLET ORAL EVERY 8 HOURS PRN
Qty: 20 TABLET | Refills: 0 | Status: SHIPPED | OUTPATIENT
Start: 2023-09-08 | End: 2024-02-23

## 2023-09-08 RX ORDER — AZITHROMYCIN 250 MG/1
TABLET, FILM COATED ORAL
Qty: 6 TABLET | Refills: 1 | Status: SHIPPED | OUTPATIENT
Start: 2023-09-08 | End: 2023-10-30

## 2023-09-08 RX ORDER — PHENTERMINE HYDROCHLORIDE 15 MG/1
15 CAPSULE ORAL EVERY MORNING
Qty: 30 CAPSULE | Refills: 5 | Status: SHIPPED | OUTPATIENT
Start: 2023-09-08 | End: 2024-04-03

## 2023-09-08 RX ORDER — TOPIRAMATE 25 MG/1
25 TABLET, FILM COATED ORAL DAILY
Qty: 30 TABLET | Refills: 0 | Status: CANCELLED | OUTPATIENT
Start: 2023-09-08

## 2023-09-08 ASSESSMENT — ASTHMA QUESTIONNAIRES: ACT_TOTALSCORE: 23

## 2023-09-08 ASSESSMENT — PAIN SCALES - GENERAL: PAINLEVEL: MILD PAIN (2)

## 2023-09-08 NOTE — PROGRESS NOTES
Assessment & Plan     Chronic cough  Recurrent bronchitis   - azithromycin (ZITHROMAX) 250 MG tablet; 2 TABLETS TODAY, THEN 1 TABLET DAILY x next 4 days    Overweight  Has had some success with phentermine not able tolerate topomax   - phentermine (ADIPEX-P) 15 MG capsule; Take 1 capsule (15 mg) by mouth every morning    Anxiety  Stable no change in treatment plan. Uses this prn only   - LORazepam (ATIVAN) 0.5 MG tablet; Take 1 tablet (0.5 mg) by mouth every 8 hours as needed for anxiety    Screen for colon cancer    - Colonoscopy Screening  Referral; Future                 Xiao Dasilva MD  [facility]    Dhaval Vidal is a 78 year old, presenting for the following health issues:  Weight Problem        9/8/2023     1:12 PM   Additional Questions   Roomed by Cecile       History of Present Illness       Reason for visit:  Several including prescription renewal, Diet pills decision, shin splints,  misc.    She eats 2-3 servings of fruits and vegetables daily.She consumes 1 sweetened beverage(s) daily.She exercises with enough effort to increase her heart rate 30 to 60 minutes per day.  She exercises with enough effort to increase her heart rate 5 days per week.   She is taking medications regularly.       Medication Followup of Phentermine and Topamax  Taking Medication as prescribed: NO-only took Topamax for 1 week   Side Effects:  anxious and restless - stopped after discontinue of Topamax   Medication Helping Symptoms:  yes- phentermine helping but not as much with taking Topamax with Phentermine.     Wt Readings from Last 3 Encounters:   09/08/23 85.7 kg (189 lb)   08/09/23 88.5 kg (195 lb)   06/07/23 84.8 kg (187 lb)         Anxiety Follow-Up  How are you doing with your anxiety since your last visit? No change  Are you having other symptoms that might be associated with anxiety? No  Have you had a significant life event? OTHER: daughter with cancer    Are you  "feeling depressed? No  Do you have any concerns with your use of alcohol or other drugs? No    Social History     Tobacco Use    Smoking status: Never    Smokeless tobacco: Never   Vaping Use    Vaping Use: Never used   Substance Use Topics    Alcohol use: Yes     Comment: 2 daily (wine)    Drug use: No         5/4/2018    11:03 AM 5/4/2018    11:04 AM 3/19/2020     4:23 PM   JARETT-7 SCORE   Total Score 0 (minimal anxiety)  3 (minimal anxiety)   Total Score  0 3         4/19/2017     1:51 PM 4/19/2017     1:57 PM 5/4/2018    11:02 AM   PHQ   PHQ-9 Total Score 5 6 2   Q9: Thoughts of better off dead/self-harm past 2 weeks Not at all Not at all Not at all           Review of Systems   Constitutional, HEENT, cardiovascular, pulmonary, gi and gu systems are negative, except as otherwise noted.      Objective    /72 (Cuff Size: Adult Large)   Pulse 74   Temp 98.4  F (36.9  C) (Tympanic)   Resp 16   Ht 1.683 m (5' 6.25\")   Wt 85.7 kg (189 lb)   SpO2 98%   BMI 30.28 kg/m    Body mass index is 30.28 kg/m .  Physical Exam   GENERAL APPEARANCE: healthy, alert, and no distress  RESP: lungs clear to auscultation - no rales, rhonchi or wheezes  CV: regular rates and rhythm, normal S1 S2, no S3 or S4, and no murmur, click or rub  PSYCH: mentation appears normal and affect normal/bright                      "

## 2023-10-23 ENCOUNTER — TELEPHONE (OUTPATIENT)
Dept: FAMILY MEDICINE | Facility: CLINIC | Age: 78
End: 2023-10-23
Payer: MEDICARE

## 2023-10-23 NOTE — TELEPHONE ENCOUNTER
Has had lifelong constipation but worse now since starting phentermine. She takes a daily stool softener but missed a couple days and now feels constipated. She has had a small, hard BM this afternoon and feels like things are starting to move, advised a fleet enema or dulcolax suppository if she does not have a BM by later today, she will call back with further concerns

## 2023-10-23 NOTE — TELEPHONE ENCOUNTER
"  General Call      Reason for Call:      What are your questions or concerns:  Pt calling because she believes she has an \"impacted anus\". Pt stated she has always had an issue with constipation and takes medication for it, but missed dose one day. Has been having issues for a couple days now. Wondering what she should do. Would like to speak with a nurse.      Could we send this information to you in [place] or would you prefer to receive a phone call?:   Patient would prefer a phone call   Okay to leave a detailed message?: Yes at Cell number on file:    Telephone Information:   Mobile [phone]     "

## 2023-10-30 ENCOUNTER — ANCILLARY PROCEDURE (OUTPATIENT)
Dept: GENERAL RADIOLOGY | Facility: CLINIC | Age: 78
End: 2023-10-30
Attending: NURSE PRACTITIONER
Payer: MEDICARE

## 2023-10-30 ENCOUNTER — OFFICE VISIT (OUTPATIENT)
Dept: FAMILY MEDICINE | Facility: CLINIC | Age: 78
End: 2023-10-30
Payer: MEDICARE

## 2023-10-30 VITALS
BODY MASS INDEX: 29.73 KG/M2 | SYSTOLIC BLOOD PRESSURE: 136 MMHG | HEIGHT: 66 IN | WEIGHT: 185 LBS | RESPIRATION RATE: 16 BRPM | OXYGEN SATURATION: 100 % | HEART RATE: 74 BPM | TEMPERATURE: 98.1 F | DIASTOLIC BLOOD PRESSURE: 82 MMHG

## 2023-10-30 DIAGNOSIS — R05.1 ACUTE COUGH: ICD-10-CM

## 2023-10-30 DIAGNOSIS — J01.90 ACUTE SINUSITIS WITH SYMPTOMS > 10 DAYS: Primary | ICD-10-CM

## 2023-10-30 DIAGNOSIS — M25.512 CHRONIC LEFT SHOULDER PAIN: ICD-10-CM

## 2023-10-30 DIAGNOSIS — G89.29 CHRONIC LEFT SHOULDER PAIN: ICD-10-CM

## 2023-10-30 PROCEDURE — 99214 OFFICE O/P EST MOD 30 MIN: CPT | Performed by: NURSE PRACTITIONER

## 2023-10-30 PROCEDURE — 73030 X-RAY EXAM OF SHOULDER: CPT | Mod: TC | Performed by: RADIOLOGY

## 2023-10-30 RX ORDER — BENZONATATE 100 MG/1
100 CAPSULE ORAL 3 TIMES DAILY PRN
Qty: 20 CAPSULE | Refills: 1 | Status: SHIPPED | OUTPATIENT
Start: 2023-10-30 | End: 2024-04-03

## 2023-10-30 RX ORDER — AZITHROMYCIN 250 MG/1
TABLET, FILM COATED ORAL
Qty: 6 TABLET | Refills: 0 | Status: SHIPPED | OUTPATIENT
Start: 2023-10-30 | End: 2024-04-03

## 2023-10-30 ASSESSMENT — PAIN SCALES - GENERAL: PAINLEVEL: MILD PAIN (2)

## 2023-10-30 NOTE — PATIENT INSTRUCTIONS
Augmentin twice daily for 10 days    Tessalon as needed for cough     Physical therapy referral placed for shoulder     Follow up if symptoms do not improve or worsen.

## 2023-10-30 NOTE — PROGRESS NOTES
Assessment & Plan     Acute sinusitis with symptoms > 10 days  No acute distress. With ongoing symptoms plan to start Augmentin. Symptomatic care and follow up discussed  - amoxicillin-clavulanate (AUGMENTIN) 875-125 MG tablet; Take 1 tablet by mouth 2 times daily for 10 days    Acute cough  Tessalon as needed for cough   - benzonatate (TESSALON) 100 MG capsule; Take 1 capsule (100 mg) by mouth 3 times daily as needed for cough    Chronic left shoulder pain  No acute distress. X ray completed with chronic symptoms which showed small area calcific tendinitis. Discussion on injection vs physical therapy. Will start with physical and consider injection/ortho referral if not improving. Symptomatic care and follow up discussed  - XR Shoulder Left 2 Views; Future  - Physical Therapy Referral; Future      SANJUANITA James CNP  [facility]    Dhaval Vidal is a 78 year old, presenting for the following health issues:  Sinus Problem and Musculoskeletal Problem        10/30/2023    10:13 AM   Additional Questions   Roomed by KHUSHBOO Huber       History of Present Illness       Reason for visit:  Rotator cuff. Constipation issues sinus drug review erc    She eats 2-3 servings of fruits and vegetables daily.She consumes 0 sweetened beverage(s) daily.She exercises with enough effort to increase her heart rate 60 or more minutes per day.  She exercises with enough effort to increase her heart rate 5 days per week.   She is taking medications regularly.     Musculoskeletal problem/pain    Duration: 6 months   Description  Location: left shoulder   Intensity:  moderate  Accompanying signs and symptoms: poor ROM, pain with ROM   History  Previous similar problem: YES  Previous evaluation:  none  Precipitating or alleviating factors:  Trauma or overuse: no   Aggravating factors include: ROM  Therapies tried and outcome: Ibuprofen       Acute Illness  Acute illness concerns: Sinus   Onset/Duration: 6  "weeks or more   Symptoms:  Fever: No  Chills/Sweats: No  Headache (location?): YES  Sinus Pressure: YES  Conjunctivitis:  No  Ear Pain: no  Rhinorrhea: YES  Congestion: YES  Sore Throat: No  Cough: YES  Wheeze: No  Decreased Appetite: No  Nausea: No  Vomiting: No  Diarrhea: No  Dysuria/Freq.: No  Dysuria or Hematuria: No  Fatigue/Achiness: YES  Sick/Strep Exposure: No  Therapies tried and outcome: would like tessalon for cough       Review of Systems   Constitutional, HEENT, cardiovascular, pulmonary, gi and gu systems are negative, except as otherwise noted.      Objective    /82 (Cuff Size: Adult Large)   Pulse 74   Temp 98.1  F (36.7  C) (Tympanic)   Resp 16   Ht 1.683 m (5' 6.25\")   Wt 83.9 kg (185 lb)   SpO2 100%   BMI 29.63 kg/m    Body mass index is 29.63 kg/m .  Physical Exam   GENERAL: healthy, alert and no distress  HENT: normal cephalic/atraumatic, ear canals and TM's normal, nose and mouth without ulcers or lesions, oropharynx clear, oral mucous membranes moist, and sinuses: maxillary tenderness on bilateral  NECK: no adenopathy, no asymmetry, masses, or scars and thyroid normal to palpation  RESP: lungs clear to auscultation - no rales, rhonchi or wheezes  CV: regular rate and rhythm, normal S1 S2, no S3 or S4, no murmur, click or rub, no peripheral edema and peripheral pulses strong  MS: left shoulder with full flexion and extension, internal rotation to posterior hip, limited abduction due to discomfort  PSYCH: mentation appears normal, affect normal/bright    Results for orders placed or performed in visit on 10/30/23   XR Shoulder Left 2 Views     Status: None    Narrative    XR SHOULDER LEFT 2 VIEWS 10/30/2023 11:14 AM     HISTORY: Chronic left shoulder pain; Chronic left shoulder pain    COMPARISON: None.      Impression    IMPRESSION: No fractures are evident. Normal glenohumeral alignment.  The acromioclavicular joint is unremarkable. Small calcification  adjacent to greater " tuberosity consistent with calcific tendinopathy.     LINETTE MACEDO MD         SYSTEM ID:  [id number]

## 2023-11-15 ENCOUNTER — THERAPY VISIT (OUTPATIENT)
Dept: PHYSICAL THERAPY | Facility: CLINIC | Age: 78
End: 2023-11-15
Attending: NURSE PRACTITIONER
Payer: MEDICARE

## 2023-11-15 DIAGNOSIS — G89.29 CHRONIC LEFT SHOULDER PAIN: ICD-10-CM

## 2023-11-15 DIAGNOSIS — M25.512 CHRONIC LEFT SHOULDER PAIN: ICD-10-CM

## 2023-11-15 PROCEDURE — 97110 THERAPEUTIC EXERCISES: CPT | Mod: GP | Performed by: PHYSICAL MEDICINE & REHABILITATION

## 2023-11-15 PROCEDURE — 97140 MANUAL THERAPY 1/> REGIONS: CPT | Mod: GP | Performed by: PHYSICAL MEDICINE & REHABILITATION

## 2023-11-15 PROCEDURE — 97161 PT EVAL LOW COMPLEX 20 MIN: CPT | Mod: GP | Performed by: PHYSICAL MEDICINE & REHABILITATION

## 2023-11-15 NOTE — PROGRESS NOTES
"PHYSICAL THERAPY EVALUATION  Type of Visit: Evaluation    See electronic medical record for Abuse and Falls Screening details.    Subjective   Pt arrived to PT today for L shoulder pain that started 3 months ago. Notes pain located along posterior aspect of L shoulder. Notes pain does radiate into upper arm, feels like a numb, dull pain with tingling. Notes pain sleeping on L. No radiation of sx into neck. Pt reports no KIAH, just came on.   Notes pain, trouble with putting weight on it, stiffness, loss of strength, numbness, tingling.  Date of onset: 10/25/23 (\"started 3 weeks ago\")   Relevant medical history per pt report:   asthma, cancer, bladder control  Dates & types of surgery:  R RTC    Prior diagnostic imaging/testing results:  xray-see epic  Prior therapy history for the same diagnosis, illness or injury:   notes similar episode for R shoulder previously    Prior Level of Function  Transfers: Independent  Ambulation: Independent  ADL: Independent    Living Environment  Social support:   family/friends    Employment:   business owner    Patient goals for therapy:  improve reaching overhead, improve dressing (being able to put L shoulder in second)    Pain assessment: Pt reports pain better with changing positions, OTC medications. Pain worse with certain positions and reaching overhead. Pain feels dull and aching. Pain at worst is 10/10, unable to quantify at best.      Objective   SHOULDER EVALUATION  INTEGUMENTARY (edema, incisions): no palpable edema  POSTURE: rounded shoulders, forward head  GAIT:   Weightbearing Status: WBAT  Assistive Device(s): None  Gait Deviations: WNL  AROM: flexion: 98*, abd: 92*, IR: L PSIS, ER: T3/4. Pain with all motions at end range  PROM: flexion: 130*, abd: 90*, ER at 90* abd: 0*, IR at 90* abd: 60*. Pain with all motions at end range  STRENGTH: flexion: 5-/5, ext: 5/5, abd: 5-/5, ER: 5-/5, IR: 5-/5. Elbow flexion and ext: 5/5  FLEXIBILITY: limited pecs and upper " traps  SPECIAL TESTS: neg obriens, neg glasgow pollo, neg neers, neg ACJ, neg cross over and neg lift off  PALPATION: notes pain over upper traps and supraspinatus  JOINT MOBILITY: hypomobility of L GHJ, ACJ, SCJ  CERVICAL SCREEN: WFL    Assessment & Plan   CLINICAL IMPRESSIONS  Medical Diagnosis: Chronic left shoulder pain    Treatment Diagnosis: chronic L shoulder pain   Impression/Assessment: Patient is a 78 year old female with L shoulder pain complaints.  The following significant findings have been identified: Pain, Decreased ROM/flexibility, Decreased joint mobility, Decreased strength, Impaired muscle performance, Decreased activity tolerance, and Impaired posture. These impairments interfere with their ability to perform self care tasks, work tasks, recreational activities, household chores, driving , household mobility, and community mobility as compared to previous level of function.     Clinical Decision Making (Complexity):  Clinical Presentation: Stable/Uncomplicated  Clinical Presentation Rationale: based on medical and personal factors listed in PT evaluation  Clinical Decision Making (Complexity): Low complexity    PLAN OF CARE  Treatment Interventions:  Modalities: Cryotherapy, E-stim, Hot Pack, Mechanical Traction, Ultrasound, TENS  Interventions: Gait Training, Manual Therapy, Neuromuscular Re-education, Therapeutic Activity, Therapeutic Exercise, Self-Care/Home Management    Long Term Goals   PT Goal 1  Goal Identifier: 1  Goal Description: Pt will be able to flex L shoulder 150* in order to decrease difficulty with reaching overhead  Target Date: 12/13/23  PT Goal 2  Goal Identifier: 2  Goal Description: Pt will be able to abd L shoulder 150* in order to decrease difficulty with reaching overhead  Target Date: 12/27/23  PT Goal 3  Goal Identifier: 3  Goal Description: Pt will be independent with HEP in order to self manage symptoms  Target Date: 01/10/24  PT Goal 4  Goal Identifier: 4  Goal  "Description: Pt will demonstrate 5/5 L shoulder strength in order to decrease difficulty with lifting and carrying  Target Date: 01/10/24    Frequency of Treatment: 1x/week  Duration of Treatment: 8 weeks    Recommended Referrals to Other Professionals:  none  Education Assessment:   Learner/Method: Patient;Listening;Reading;Demonstration;Pictures/Video  Education Comments: pt demonstrated and verbalized good understanding    Risks and benefits of evaluation/treatment have been explained.   Patient/Family/caregiver agrees with Plan of Care.     Evaluation Time:   PT Eval, Low Complexity Minutes (31952): 17   Present: Not applicable     Signing Clinician: Jeannie Billingsley PT      [facility]                                                                                   OUTPATIENT PHYSICAL THERAPY    PLAN OF TREATMENT FOR OUTPATIENT REHABILITATION   Patient's Last Name, First Name, Ruba Potter YOB: 1945   Provider's Name   [facility]   Medical Record No.  [id number]     Onset Date: 10/25/23 (\"started 3 weeks ago\")  Start of Care Date: 11/15/23     Medical Diagnosis:  Chronic left shoulder pain      PT Treatment Diagnosis:  chronic L shoulder pain Plan of Treatment  Frequency/Duration: 1x/week/ 8 weeks    Certification date from 11/15/23 to 01/10/24         See note for plan of treatment details and functional goals     Jeannie Billingsley PT                         I CERTIFY THE NEED FOR THESE SERVICES FURNISHED UNDER        THIS PLAN OF TREATMENT AND WHILE UNDER MY CARE     (Physician attestation of this document indicates review and certification of the therapy plan).              Referring Provider:  Jeanna Lane    Initial Assessment  See Epic Evaluation- Start of Care Date: 11/15/23                "

## 2023-11-28 ENCOUNTER — THERAPY VISIT (OUTPATIENT)
Dept: PHYSICAL THERAPY | Facility: CLINIC | Age: 78
End: 2023-11-28
Attending: NURSE PRACTITIONER
Payer: MEDICARE

## 2023-11-28 DIAGNOSIS — G89.29 CHRONIC LEFT SHOULDER PAIN: Primary | ICD-10-CM

## 2023-11-28 DIAGNOSIS — M25.512 CHRONIC LEFT SHOULDER PAIN: Primary | ICD-10-CM

## 2023-11-28 PROCEDURE — 97110 THERAPEUTIC EXERCISES: CPT | Mod: GP | Performed by: PHYSICAL MEDICINE & REHABILITATION

## 2023-11-28 PROCEDURE — 97140 MANUAL THERAPY 1/> REGIONS: CPT | Mod: GP | Performed by: PHYSICAL MEDICINE & REHABILITATION

## 2023-11-30 ENCOUNTER — MYC MEDICAL ADVICE (OUTPATIENT)
Dept: FAMILY MEDICINE | Facility: CLINIC | Age: 78
End: 2023-11-30
Payer: MEDICARE

## 2023-11-30 DIAGNOSIS — B37.31 YEAST INFECTION OF THE VAGINA: Primary | ICD-10-CM

## 2023-12-01 RX ORDER — FLUCONAZOLE 150 MG/1
150 TABLET ORAL ONCE
Qty: 1 TABLET | Refills: 0 | Status: SHIPPED | OUTPATIENT
Start: 2023-12-01 | End: 2023-12-01

## 2023-12-01 NOTE — TELEPHONE ENCOUNTER
Please see [employer] message.     RX pended and message routed to Dr. Dasilva for consideration.     Julie Behrendt RN

## 2023-12-01 NOTE — TELEPHONE ENCOUNTER
10-30-23 OV, Acute sinusitis, Augmentin prescribed.   Dr. Dasilva- please see [employer] message re vag yeast inf sx, request for Rx.  PRANEETH Jones RN

## 2023-12-06 ENCOUNTER — THERAPY VISIT (OUTPATIENT)
Dept: PHYSICAL THERAPY | Facility: CLINIC | Age: 78
End: 2023-12-06
Attending: NURSE PRACTITIONER
Payer: MEDICARE

## 2023-12-06 DIAGNOSIS — G89.29 CHRONIC LEFT SHOULDER PAIN: Primary | ICD-10-CM

## 2023-12-06 DIAGNOSIS — M25.512 CHRONIC LEFT SHOULDER PAIN: Primary | ICD-10-CM

## 2023-12-06 PROCEDURE — 97110 THERAPEUTIC EXERCISES: CPT | Mod: GP | Performed by: PHYSICAL MEDICINE & REHABILITATION

## 2023-12-19 ENCOUNTER — THERAPY VISIT (OUTPATIENT)
Dept: PHYSICAL THERAPY | Facility: CLINIC | Age: 78
End: 2023-12-19
Attending: NURSE PRACTITIONER
Payer: MEDICARE

## 2023-12-19 DIAGNOSIS — M25.512 CHRONIC LEFT SHOULDER PAIN: Primary | ICD-10-CM

## 2023-12-19 DIAGNOSIS — G89.29 CHRONIC LEFT SHOULDER PAIN: Primary | ICD-10-CM

## 2023-12-19 PROCEDURE — 97110 THERAPEUTIC EXERCISES: CPT | Mod: GP | Performed by: PHYSICAL THERAPIST

## 2024-01-03 RX ORDER — SOD SULF/POT CHLORIDE/MAG SULF 1.479 G
12 TABLET ORAL 2 TIMES DAILY
Qty: 24 TABLET | Refills: 0 | Status: SHIPPED | OUTPATIENT
Start: 2024-01-03 | End: 2024-04-03

## 2024-01-09 ENCOUNTER — ANESTHESIA EVENT (OUTPATIENT)
Dept: GASTROENTEROLOGY | Facility: CLINIC | Age: 79
End: 2024-01-09
Payer: MEDICARE

## 2024-01-09 NOTE — ANESTHESIA PREPROCEDURE EVALUATION
Anesthesia Pre-Procedure Evaluation    Patient: Ruba Farmer   MRN: [id number] : 1945        Procedure : Procedure(s):  Colonoscopy          Past Medical History:   Diagnosis Date    Bronchitis     pt reports yearly bronchitis    Diagnostic skin and sensitization tests (aka ALLERGENS) 7/22/15 IgE tests all NEGATIVE for environmental allergens.     Nonallergic rhinitis     7/22/15 IgE tests all NEGATIVE for environmental allergens.     Pneumonia     hx of several episodes of pneumonia    Recurrent UTI     Uncomplicated asthma       Past Surgical History:   Procedure Laterality Date    ARTHROSCOPY SHOULDER DECOMPRESSION Right 3/2/2017    Procedure: ARTHROSCOPY SHOULDER DECOMPRESSION;  Surgeon: Dallas Rene MD;  Location: [facility]    BIOPSY    ?    COLONOSCOPY  2007    HYSTERECTOMY, VAGINAL  1998    PHACOEMULSIFICATION WITH STANDARD INTRAOCULAR LENS IMPLANT  2013    Procedure: PHACOEMULSIFICATION WITH STANDARD INTRAOCULAR LENS IMPLANT;  Left Kelman phacoemulsification with intraocular lens implant;  Surgeon: Alverto Tijerina MD;  Location: [facility]      Allergies   Allergen Reactions    Macrobid [Nitrofuran Derivatives] Shortness Of Breath and Other (See Comments)     High fever    Morphine And Related Other (See Comments) and Nausea and Vomiting     headache      Social History     Tobacco Use    Smoking status: Never    Smokeless tobacco: Never   Substance Use Topics    Alcohol use: Yes     Comment: 2 daily (wine)      Wt Readings from Last 1 Encounters:   10/30/23 83.9 kg (185 lb)        Anesthesia Evaluation   Pt has had prior anesthetic. Type: MAC and General.    No history of anesthetic complications       ROS/MED HX  ENT/Pulmonary:     (+)           allergic rhinitis,          Mild Persistent, asthma                  Neurologic:       Cardiovascular:     (+)  - -   -  - -                                 Previous cardiac testing   Echo: Date:  Results:  Procedure  Stress Echo  Complete. Contrast Optison.  _____________________________________________________________________________  __        Interpretation Summary  This was a normal stress echocardiogram with no evidence of stress-induced  ischemia.  The Duke treadmill score was intermediate risk ( -11< Garza score <5).  _____________________________________________________________________________  __     Stress  The patient exercised 6:35.  There was a normal BP response to exercise.  Exercise was stopped due to fatigue.  The patient exhibited no chest pain during exercise.  A treadmill exercise test according to the Jonathon protocol was performed.  Target Heart Rate was achieved.  The Duke treadmill score was intermediate risk ( -11< Garza score <5).  There was 1.0 mm horizontal ST depression in leads II,II and aVF and leads V4-  V6 with resolution in later recovery period.  Left ventricular cavity size decreases with exercise.  Global LV systolic function augments with exercise.  The visual ejection fraction is estimated at >70%.  Normal left ventricular function and wall motion at rest and post-stress.  This was a normal stress echocardiogram with no evidence of stress-induced  ischemia.     Baseline  The patient is in normal sinus rhythm.  No arrhythmia noted.  Normal left ventricular function and wall motion at rest.  The visual ejection fraction is estimated at 55-60%.     Stress Results         Protocol:  Jonathon Protocol        Maximum Predicted HR:   147 bpm         Target HR: 125 bpm               % Maximum Predicted HR: 102 %        Stage  DurationHeart Rate  BP                    Comment            (mm:ss)   (bpm)    Stage 1   3:00     121    142/72    Stage 2   3:00     142    164/72     Peak     0:35     150      /   Term-fatigue; Duke(1); FAC-above; [id number]   RecoveryR  6:00      89    138/68               Stress Duration:   6:35 mm:ss *        Recovery Time: 6:00 mm:ss         Maximum Stress HR: 150 bpm *           METS:      "     7     Mitral Valve  The mitral valve leaflets appear normal. There is no evidence of stenosis,  fluttering, or prolapse. There is mild mitral annular calcification. There is  trace mitral regurgitation.        Tricuspid Valve  There is mild (1+) tricuspid regurgitation. The right ventricular systolic  pressure is approximated at 26.1 mmHg plus the right atrial pressure.  ________________________________________________________________    Stress Test:  Date: Results:    ECG Reviewed:  Date: 12/18 Results:  Sinus  Rhythm   WITHIN NORMAL LIMITS  Cath:  Date: Results:      METS/Exercise Tolerance: >4 METS    Hematologic:       Musculoskeletal:       GI/Hepatic:       Renal/Genitourinary:       Endo:       Psychiatric/Substance Use:     (+) psychiatric history anxiety       Infectious Disease:       Malignancy:       Other:            Physical Exam    Airway  airway exam normal      Mallampati: II   TM distance: > 3 FB   Neck ROM: full   Mouth opening: > 3 cm    Respiratory Devices and Support         Dental  no notable dental history     (+) Minor Abnormalities - some fillings, tiny chips      Cardiovascular   cardiovascular exam normal          Pulmonary   pulmonary exam normal                OUTSIDE LABS:  CBC:   Lab Results   Component Value Date    WBC 3.8 (L) 02/20/2017    WBC 4.4 01/04/2016    HGB 11.8 02/20/2017    HGB 12.1 01/04/2016    HCT 34.8 (L) 02/20/2017    HCT 37.2 01/04/2016     02/20/2017     01/04/2016     BMP:   Lab Results   Component Value Date     08/10/2021     07/27/2017    POTASSIUM 4.0 08/10/2021    POTASSIUM 4.3 07/27/2017    CHLORIDE 104 08/10/2021    CHLORIDE 105 07/27/2017    CO2 29 08/10/2021    CO2 30 07/27/2017    BUN 11 08/10/2021    BUN 10 07/27/2017    CR 0.75 08/10/2021    CR 0.66 07/27/2017    GLC 86 08/10/2021    GLC 94 07/27/2017     COAGS: No results found for: \"PTT\", \"INR\", \"FIBR\"  POC: No results found for: \"BGM\", \"HCG\", \"HCGS\"  HEPATIC:   Lab " Results   Component Value Date    ALBUMIN 3.8 08/10/2021    PROTTOTAL 7.1 08/10/2021    ALT 32 08/10/2021    AST 20 08/10/2021    ALKPHOS 55 08/10/2021    BILITOTAL 0.6 08/10/2021     OTHER:   Lab Results   Component Value Date    SAPPHIRE 9.2 08/10/2021    TSH 2.71 12/05/2022    CRP <5.0 06/26/2013       Anesthesia Plan    ASA Status:  2    NPO Status:  NPO Appropriate    Anesthesia Type: General.   Induction: Propofol, Intravenous.   Maintenance: TIVA.        Consents    Anesthesia Plan(s) and associated risks, benefits, and realistic alternatives discussed. Questions answered and patient/representative(s) expressed understanding.     - Discussed: Risks, Benefits and Alternatives for BOTH SEDATION and the PROCEDURE were discussed     - Discussed with:  Patient            Postoperative Care    Pain management: Multi-modal analgesia, IV analgesics, Oral pain medications.   PONV prophylaxis: Ondansetron (or other 5HT-3), Dexamethasone or Solumedrol, Background Propofol Infusion     Comments:               SANJUANITA Hassan CRNA    I have reviewed the pertinent notes and labs in the chart from the past 30 days and (re)examined the patient.  Any updates or changes from those notes are reflected in this note.

## 2024-01-10 ENCOUNTER — ANESTHESIA (OUTPATIENT)
Dept: GASTROENTEROLOGY | Facility: CLINIC | Age: 79
End: 2024-01-10
Payer: MEDICARE

## 2024-01-10 ENCOUNTER — HOSPITAL ENCOUNTER (OUTPATIENT)
Facility: CLINIC | Age: 79
Discharge: HOME | End: 2024-01-10
Attending: SURGERY | Admitting: SURGERY
Payer: MEDICARE

## 2024-01-10 VITALS
OXYGEN SATURATION: 99 % | HEART RATE: 74 BPM | WEIGHT: 177 LBS | RESPIRATION RATE: 17 BRPM | BODY MASS INDEX: 28.45 KG/M2 | DIASTOLIC BLOOD PRESSURE: 61 MMHG | HEIGHT: 66 IN | TEMPERATURE: 98.5 F | SYSTOLIC BLOOD PRESSURE: 118 MMHG

## 2024-01-10 DIAGNOSIS — Z12.11 SPECIAL SCREENING FOR MALIGNANT NEOPLASMS, COLON: Primary | ICD-10-CM

## 2024-01-10 LAB — COLONOSCOPY: NORMAL

## 2024-01-10 PROCEDURE — 370N000017 HC ANESTHESIA TECHNICAL FEE, PER MIN: Performed by: SURGERY

## 2024-01-10 PROCEDURE — 88305 TISSUE EXAM BY PATHOLOGIST: CPT | Mod: TC | Performed by: SURGERY

## 2024-01-10 PROCEDURE — 45380 COLONOSCOPY AND BIOPSY: CPT | Performed by: SURGERY

## 2024-01-10 PROCEDURE — 258N000003 HC RX IP 258 OP 636: Performed by: SURGERY

## 2024-01-10 PROCEDURE — 250N000009 HC RX 250: Performed by: NURSE ANESTHETIST, CERTIFIED REGISTERED

## 2024-01-10 PROCEDURE — 88305 TISSUE EXAM BY PATHOLOGIST: CPT | Mod: 26 | Performed by: PATHOLOGY

## 2024-01-10 PROCEDURE — 45385 COLONOSCOPY W/LESION REMOVAL: CPT | Performed by: SURGERY

## 2024-01-10 PROCEDURE — 250N000011 HC RX IP 250 OP 636: Performed by: NURSE ANESTHETIST, CERTIFIED REGISTERED

## 2024-01-10 PROCEDURE — 45382 COLONOSCOPY W/CONTROL BLEED: CPT | Performed by: SURGERY

## 2024-01-10 RX ORDER — FLUMAZENIL 0.1 MG/ML
0.2 INJECTION, SOLUTION INTRAVENOUS
Status: CANCELLED | OUTPATIENT
Start: 2024-01-10 | End: 2024-01-10

## 2024-01-10 RX ORDER — OXYCODONE HYDROCHLORIDE 5 MG/1
10 TABLET ORAL
Status: DISCONTINUED | OUTPATIENT
Start: 2024-01-10 | End: 2024-01-10 | Stop reason: HOSPADM

## 2024-01-10 RX ORDER — NALOXONE HYDROCHLORIDE 0.4 MG/ML
0.2 INJECTION, SOLUTION INTRAMUSCULAR; INTRAVENOUS; SUBCUTANEOUS
Status: CANCELLED | OUTPATIENT
Start: 2024-01-10

## 2024-01-10 RX ORDER — LIDOCAINE 40 MG/G
CREAM TOPICAL
Status: DISCONTINUED | OUTPATIENT
Start: 2024-01-10 | End: 2024-01-10 | Stop reason: HOSPADM

## 2024-01-10 RX ORDER — ONDANSETRON 4 MG/1
4 TABLET, ORALLY DISINTEGRATING ORAL EVERY 30 MIN PRN
Status: DISCONTINUED | OUTPATIENT
Start: 2024-01-10 | End: 2024-01-10 | Stop reason: HOSPADM

## 2024-01-10 RX ORDER — LIDOCAINE HYDROCHLORIDE 20 MG/ML
INJECTION, SOLUTION INFILTRATION; PERINEURAL PRN
Status: DISCONTINUED | OUTPATIENT
Start: 2024-01-10 | End: 2024-01-10

## 2024-01-10 RX ORDER — NALOXONE HYDROCHLORIDE 0.4 MG/ML
0.4 INJECTION, SOLUTION INTRAMUSCULAR; INTRAVENOUS; SUBCUTANEOUS
Status: CANCELLED | OUTPATIENT
Start: 2024-01-10

## 2024-01-10 RX ORDER — ONDANSETRON 2 MG/ML
4 INJECTION INTRAMUSCULAR; INTRAVENOUS EVERY 30 MIN PRN
Status: DISCONTINUED | OUTPATIENT
Start: 2024-01-10 | End: 2024-01-10 | Stop reason: HOSPADM

## 2024-01-10 RX ORDER — OXYCODONE HYDROCHLORIDE 5 MG/1
5 TABLET ORAL
Status: DISCONTINUED | OUTPATIENT
Start: 2024-01-10 | End: 2024-01-10 | Stop reason: HOSPADM

## 2024-01-10 RX ORDER — SODIUM CHLORIDE, SODIUM LACTATE, POTASSIUM CHLORIDE, CALCIUM CHLORIDE 600; 310; 30; 20 MG/100ML; MG/100ML; MG/100ML; MG/100ML
INJECTION, SOLUTION INTRAVENOUS CONTINUOUS
Status: DISCONTINUED | OUTPATIENT
Start: 2024-01-10 | End: 2024-01-10 | Stop reason: HOSPADM

## 2024-01-10 RX ORDER — PROPOFOL 10 MG/ML
INJECTION, EMULSION INTRAVENOUS CONTINUOUS PRN
Status: DISCONTINUED | OUTPATIENT
Start: 2024-01-10 | End: 2024-01-10

## 2024-01-10 RX ORDER — ONDANSETRON 2 MG/ML
4 INJECTION INTRAMUSCULAR; INTRAVENOUS
Status: DISCONTINUED | OUTPATIENT
Start: 2024-01-10 | End: 2024-01-10 | Stop reason: HOSPADM

## 2024-01-10 RX ORDER — PROPOFOL 10 MG/ML
INJECTION, EMULSION INTRAVENOUS PRN
Status: DISCONTINUED | OUTPATIENT
Start: 2024-01-10 | End: 2024-01-10

## 2024-01-10 RX ADMIN — PROPOFOL 150 MCG/KG/MIN: 10 INJECTION, EMULSION INTRAVENOUS at 11:03

## 2024-01-10 RX ADMIN — LIDOCAINE HYDROCHLORIDE 30 MG: 20 INJECTION, SOLUTION INFILTRATION; PERINEURAL at 11:03

## 2024-01-10 RX ADMIN — SODIUM CHLORIDE, POTASSIUM CHLORIDE, SODIUM LACTATE AND CALCIUM CHLORIDE: 600; 310; 30; 20 INJECTION, SOLUTION INTRAVENOUS at 10:47

## 2024-01-10 RX ADMIN — LIDOCAINE HYDROCHLORIDE 0.1 ML: 10 INJECTION, SOLUTION EPIDURAL; INFILTRATION; INTRACAUDAL; PERINEURAL at 10:47

## 2024-01-10 RX ADMIN — PROPOFOL 70 MG: 10 INJECTION, EMULSION INTRAVENOUS at 11:03

## 2024-01-10 ASSESSMENT — ACTIVITIES OF DAILY LIVING (ADL): ADLS_ACUITY_SCORE: 35

## 2024-01-10 NOTE — H&P
[facility]    Pre-Endoscopy History and Physical     Ruba Farmer MRN# [id number]   YOB: 1945 Age: 78 year old     Date of Procedure: 1/10/2024  Primary care provider: Xiao Dasilva  Type of Endoscopy: Colonoscopy with possible biopsy, possible polypectomy  Reason for Procedure: Surveillance, Family history  Type of Anesthesia Anticipated: Conscious Sedation    HPI:    Ruba is a 78 year old female who will be undergoing the above procedure.      Father had colon cancer  Last colonoscopy in 2018, polyps removed.   Denies blood in stool or change in stool size    A history and physical has been performed. The patient's medications and allergies have been reviewed. The risks and benefits of the procedure and the sedation options and risks were discussed with the patient.  All questions were answered and informed consent was obtained.      She denies a personal or family history of anesthesia complications or bleeding disorders.     Patient Active Problem List   Diagnosis    Anxiety    CARDIOVASCULAR SCREENING; LDL GOAL LESS THAN 160    Advanced directives, counseling/discussion    Polyp of colon, adenomatous    Senile nuclear sclerosis    Recurrent UTI    Nonallergic rhinitis    Diagnostic skin and sensitization tests (aka ALLERGENS)    Mild persistent asthma without complication    Strain of lumbar region    Chronic congestion of paranasal sinus    Chronic pain of right knee    Chronic upper back pain    Urge incontinence of urine    Overweight    Chronic left shoulder pain        Past Medical History:   Diagnosis Date    Bronchitis     pt reports yearly bronchitis    Diagnostic skin and sensitization tests (aka ALLERGENS) 7/22/15 IgE tests all NEGATIVE for environmental allergens.     Nonallergic rhinitis     7/22/15 IgE tests all NEGATIVE for environmental allergens.     Pneumonia     hx of several episodes of pneumonia    Recurrent UTI     Uncomplicated asthma          Past Surgical History:   Procedure Laterality Date    ARTHROSCOPY SHOULDER DECOMPRESSION Right 3/2/2017    Procedure: ARTHROSCOPY SHOULDER DECOMPRESSION;  Surgeon: Dallas Rene MD;  Location: [facility]    BIOPSY  2013  ?    COLONOSCOPY  4/6/2007    HYSTERECTOMY, VAGINAL  1998    PHACOEMULSIFICATION WITH STANDARD INTRAOCULAR LENS IMPLANT  6/17/2013    Procedure: PHACOEMULSIFICATION WITH STANDARD INTRAOCULAR LENS IMPLANT;  Left Kelman phacoemulsification with intraocular lens implant;  Surgeon: Alverto Tijerina MD;  Location: [facility] OR       Social History     Tobacco Use    Smoking status: Never    Smokeless tobacco: Never   Substance Use Topics    Alcohol use: Yes     Comment: 2 daily (wine)       Family History   Problem Relation Age of Onset    Cancer Mother         endometrail cancer    Heart Disease Mother     Neurologic Disorder Mother         fibromyalgia    Other Cancer Mother     Diabetes Father     Cancer Father         pancreatic cancer    Colon Cancer Father     Heart Disease Maternal Grandmother     Prostate Cancer Maternal Grandfather     Cancer Paternal Grandfather         lung    Genitourinary Problems Paternal Grandfather         dialysis    Other Cancer Paternal Grandfather     Diabetes Brother     Depression Brother     Anxiety Disorder Brother     Mental Illness Brother     Other Cancer Paternal Grandmother     Cancer Son     Other Cancer Son        Prior to Admission medications    Medication Sig Start Date End Date Taking? Authorizing Provider   albuterol (PROAIR HFA/PROVENTIL HFA/VENTOLIN HFA) 108 (90 Base) MCG/ACT inhaler Inhale 2 puffs into the lungs every 6 hours as needed for shortness of breath / dyspnea 3/15/20  Yes Xiao Dasilva MD   Ascorbic Acid (VITAMIN C) 500 MG CAPS Take 500 mg by mouth daily    Yes Reported, Patient   azithromycin (ZITHROMAX) 250 MG tablet 2 TABLETS TODAY, THEN 1 TABLET DAILY x next 4 days 10/30/23  Yes Jeanna Lane APRN CNP   benzonatate  (TESSALON) 100 MG capsule Take 1 capsule (100 mg) by mouth 3 times daily as needed for cough 10/30/23  Yes Jeanna Lane APRN CNP   Cholecalciferol (VITAMIN D) 2000 UNITS CAPS Take 2,000 Units by mouth daily    Yes Reported, Patient   dextromethorphan-guaiFENesin (MUCINEX DM)  MG per 12 hr tablet Take 1 tablet by mouth every 12 hours   Yes Reported, Patient   Fexofenadine HCl (ALLEGRA ALLERGY PO) Take by mouth daily   Yes Reported, Patient   fluocinolone acetonide oil 0.01 % ear drops Place 0.25 mLs (5 drops) into both ears 2 times daily 4/21/23  Yes Parish Velázquez MD   fluticasone (FLONASE) 50 MCG/ACT spray Spray 1-2 sprays into both nostrils daily 5/18/17  Yes Xiao Dasilva MD   ibuprofen (ADVIL/MOTRIN) 200 MG tablet 1-3 tablets as needed with food 1/15/19  Yes Xiao Dasilva MD   LORazepam (ATIVAN) 0.5 MG tablet Take 1 tablet (0.5 mg) by mouth every 8 hours as needed for anxiety 9/8/23  Yes Xiao Dasilva MD   phentermine (ADIPEX-P) 15 MG capsule Take 1 capsule (15 mg) by mouth every morning 9/8/23  Yes Xiao Dasilva MD   POTASSIUM CITRATE PO Take 1 tablet by mouth daily   Yes Reported, Patient   pseudoePHEDrine (SUDAFED) 30 MG tablet Take 30 mg by mouth every 4 hours as needed for congestion   Yes Reported, Patient   Sodium Sulfate-Mag Sulfate-KCl (SUTAB) 2234-105-044 MG TABS Take 12 tablets by mouth 2 times daily Take as directed. Step 1: At 4 pm, open one bottle of 12 tablets Step 2: Fill the provided container with 16 ounces of water (up to the fill line). Swallow 1 tablet every 1 to 2 minutes, you should finish the 12 tablets and the entire 16 ounces of water within 20 minutes.  Step 3: Approximately 1 hour after the last tablet is ingested, fill the provided container again with 16 ounces of water (up to the fill line), and drink the entire amount over 30 minutes. Step 4: Approximately 30 minutes after finishing the second container of water, fill the  "provided container with 16 ounces of water (up to the fill ine, and drink the entire amount over 30 minutes. If you arrive for your procedure before 11 AM - At 8 PM, open the second bottle of 12 tablets.  Repeat step 1 to step 4 again. If you arrive for your procedure after 11 AM -At 6 AM on the day of the exam open the second bottle of 12 tablets.  Repeat step 1 to step 4 again. 1/3/24  Yes Cj Naik,    azelastine (ASTELIN) 0.1 % nasal spray Spray 1 spray into both nostrils 2 times daily 3/1/23   Xiao Dasilva MD   neomycin-polymyxin-dexamethasone (MAXITROL) 3.5-57339-8.1 ophthalmic ointment as needed 2/14/22   Reported, Patient   Urea 40 % CREA Apply to feet QHS  Patient not taking: Reported on 10/30/2023 12/5/22   Evelyne Hooper, KELLEN       Allergies   Allergen Reactions    Macrobid [Nitrofuran Derivatives] Shortness Of Breath and Other (See Comments)     High fever    Morphine And Related Other (See Comments) and Nausea and Vomiting     headache        REVIEW OF SYSTEMS:   5 point ROS negative except as noted above in HPI, including Gen., Resp., CV, GI &  system review.    PHYSICAL EXAM:   BP (!) 159/75 (BP Location: Right arm)   Pulse 74   Temp 98.5  F (36.9  C) (Oral)   Ht 1.683 m (5' 6.25\")   Wt 80.3 kg (177 lb)   SpO2 99%   BMI 28.35 kg/m   Estimated body mass index is 28.35 kg/m  as calculated from the following:    Height as of this encounter: 1.683 m (5' 6.25\").    Weight as of this encounter: 80.3 kg (177 lb).   Constitutional: Awake, alert, no acute distress.  Eyes: No scleral icterus.  Conjunctiva are without injection.  ENMT: Mucous membranes moist, dentition and gums are intact.   Neck: Soft, supple, trachea midline.    Endocrine: n/a   Lymphatic: There is no cervical, submandibularadenopathy.  Respiratory: normal effortgs   Cardiovascular: S1, S2  Abdomen: Non-distended, non-tender,  No masses,  Musculoskeletal: No spinal or CVA tenderness. Full range of motion " in the upper and lower extremities.    Skin: No skin rashes or lesions to inspection.  No petechia.    Neurologic: alerted and oriented 3x  Psychiatric: The patient's affect is not blunted and mood is appropriate.  DIAGNOSTICS:    Not indicated    IMPRESSION   ASA Class 2 - Mild systemic disease    PLAN:   Plan for Colonoscopy with possible biopsy, possible polypectomy. We discussed the risks, benefits and alternatives and the patient wished to proceed.  Patient is cleared for the above procedure.    The above has been forwarded to the consulting provider.    Cj Naik DO  [facility]

## 2024-01-10 NOTE — PROGRESS NOTES
[facility] DISCHARGE NOTE    Patient discharged to home at 11:56 AM via ambulation. Accompanied by other:friend and staff. Discharge instructions reviewed with patient, opportunity offered to ask questions. Prescriptions - None ordered for discharge. All belongings sent with patient.  Verbalized understanding of discharge instructions  Ewa Marie RN

## 2024-01-10 NOTE — LETTER
Ruba ISHA Farmer  [address] [facility] [address]      January 16, 2024    Dear Ruba,  This letter is written to inform you of the results of your recent colonoscopy.  Your examination showed polyp(s) in your cecum and transverse colon. All polyps were removed in their entirety and sent for review by a pathologist. As you will see on the pathology report below, the tissue(s) were tubular adenomatous polyps and normal polyps. Your examination also showed melanosis coli and diverticuli.    Diverticulosis can be described as small outpouchings (pockets) in your colon wall. This is an entirely benign (non-cancerous) finding.  Adenomatous polyps are entirely benign (non-cancerous); however, patients who have developed these polyps are at an increased risk for developing additional polyps in the future. If these are not eventually removed, there is a risk of developing colon cancer. We will advise more frequent examinations with you because of the risk associated with this type of polyp.  Normal polyps are polyps that test negative for any pathologic (abnormal) changes.    Final Diagnosis   A. Colon, cecum, polypectomy:  -- Hyperplastic polyp.  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     B. Colon, transverse, polypectomy:  -- Tubular adenoma.  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       Given these findings, your personal history of polyps, I recommend that you undergo a repeat colonoscopy in 5 year(s) for surveillance. We will enter you into a recall system so you receive a reminder closer to the time that you are due for repeat examination.     Please remember that this recommendation is made with the understanding that you are not experiencing persistent changes in bowel function, bleeding per rectum, and/or significant abdominal pain. If you experience these symptoms, please contact your primary care provider for a further evaluation.     If you have any  questions or concerns about the results of your colonoscopy or the appropriate follow-up, please contact my assistant at [phone]    Sincerely,      Cj Naik,    [facility]  ___

## 2024-01-10 NOTE — ANESTHESIA CARE TRANSFER NOTE
Patient: Ruba Farmer    Procedure: Procedure(s):  COLONOSCOPY, WITH HEMORRHAGE CONTROL  COLONOSCOPY, FLEXIBLE, WITH LESION REMOVAL USING SNARE  COLONOSCOPY, WITH POLYPECTOMY AND BIOPSY       Diagnosis: Screen for colon cancer [Z12.11]  Diagnosis Additional Information: No value filed.    Anesthesia Type:   General     Note:    Oropharynx: oropharynx clear of all foreign objects and spontaneously breathing  Level of Consciousness: awake  Oxygen Supplementation: room air    Independent Airway: airway patency satisfactory and stable  Dentition: dentition unchanged  Vital Signs Stable: post-procedure vital signs reviewed and stable  Report to RN Given: handoff report given  Patient transferred to: Phase II    Handoff Report: Identifed the Patient, Identified the Reponsible Provider, Reviewed the pertinent medical history, Discussed the surgical course, Reviewed Intra-OP anesthesia mangement and issues during anesthesia, Set expectations for post-procedure period and Allowed opportunity for questions and acknowledgement of understanding      Vitals:  Vitals Value Taken Time   /48 01/10/24 1129   Temp     Pulse 70 01/10/24 1129   Resp 17 01/10/24 1129   SpO2 99 % 01/10/24 1130   Vitals shown include unfiled device data.    Electronically Signed By: SANJUANITA Sue CRNA  January 10, 2024  11:32 AM

## 2024-01-11 LAB
PATH REPORT.COMMENTS IMP SPEC: NORMAL
PATH REPORT.COMMENTS IMP SPEC: NORMAL
PATH REPORT.FINAL DX SPEC: NORMAL
PATH REPORT.GROSS SPEC: NORMAL
PATH REPORT.MICROSCOPIC SPEC OTHER STN: NORMAL
PATH REPORT.RELEVANT HX SPEC: NORMAL
PHOTO IMAGE: NORMAL

## 2024-01-23 ENCOUNTER — MYC MEDICAL ADVICE (OUTPATIENT)
Dept: FAMILY MEDICINE | Facility: CLINIC | Age: 79
End: 2024-01-23
Payer: MEDICARE

## 2024-02-23 DIAGNOSIS — F41.9 ANXIETY: ICD-10-CM

## 2024-02-23 RX ORDER — LORAZEPAM 0.5 MG/1
0.5 TABLET ORAL EVERY 8 HOURS PRN
Qty: 20 TABLET | Refills: 0 | Status: SHIPPED | OUTPATIENT
Start: 2024-02-23 | End: 2024-05-15

## 2024-03-08 NOTE — PROGRESS NOTES
Outpatient Physical Therapy Discharge Note     Patient: Ruba Farmer  : 1945    Beginning/End Dates of Reporting Period:  11/15/2023 to 2023    Referring Provider: SANJUANITA James CNP    Therapy Diagnosis: chronic L shoulder pain      Patient did not return for follow up treatments.  Goal status and current objective information is therefore unknown.  Discharge from PT services at this time for this episode of treatment. Please see attached documentation under this episode of care for further information including dates of service, start of care date, referring physician, Dx, treatment plan, treatments, etc.    Please contact me with any questions or concerns.    Thank you for your referral.    Jeannie Billingsley, PT, DPT  Physical Therapist  [address] [address] [phone]

## 2024-03-26 ENCOUNTER — MYC MEDICAL ADVICE (OUTPATIENT)
Dept: FAMILY MEDICINE | Facility: CLINIC | Age: 79
End: 2024-03-26
Payer: MEDICARE

## 2024-03-26 DIAGNOSIS — Z13.6 CARDIOVASCULAR SCREENING; LDL GOAL LESS THAN 160: ICD-10-CM

## 2024-03-26 DIAGNOSIS — J47.1 BRONCHIECTASIS WITH ACUTE EXACERBATION (H): Primary | ICD-10-CM

## 2024-03-27 RX ORDER — AZITHROMYCIN 250 MG/1
TABLET, FILM COATED ORAL
Qty: 6 TABLET | Refills: 0 | Status: SHIPPED | OUTPATIENT
Start: 2024-03-27 | End: 2024-07-26

## 2024-04-03 ENCOUNTER — OFFICE VISIT (OUTPATIENT)
Dept: FAMILY MEDICINE | Facility: CLINIC | Age: 79
End: 2024-04-03
Attending: FAMILY MEDICINE
Payer: MEDICARE

## 2024-04-03 VITALS
TEMPERATURE: 98.1 F | WEIGHT: 181 LBS | HEIGHT: 66 IN | OXYGEN SATURATION: 97 % | HEART RATE: 72 BPM | SYSTOLIC BLOOD PRESSURE: 126 MMHG | DIASTOLIC BLOOD PRESSURE: 70 MMHG | RESPIRATION RATE: 16 BRPM | BODY MASS INDEX: 29.09 KG/M2

## 2024-04-03 DIAGNOSIS — R39.15 URINARY URGENCY: ICD-10-CM

## 2024-04-03 DIAGNOSIS — Z00.00 ENCOUNTER FOR MEDICARE ANNUAL WELLNESS EXAM: Primary | ICD-10-CM

## 2024-04-03 DIAGNOSIS — Z23 HIGH PRIORITY FOR 2019-NCOV VACCINE: ICD-10-CM

## 2024-04-03 DIAGNOSIS — E66.3 OVERWEIGHT: ICD-10-CM

## 2024-04-03 PROCEDURE — 91320 SARSCV2 VAC 30MCG TRS-SUC IM: CPT | Performed by: FAMILY MEDICINE

## 2024-04-03 PROCEDURE — G0439 PPPS, SUBSEQ VISIT: HCPCS | Performed by: FAMILY MEDICINE

## 2024-04-03 PROCEDURE — 99214 OFFICE O/P EST MOD 30 MIN: CPT | Mod: 25 | Performed by: FAMILY MEDICINE

## 2024-04-03 PROCEDURE — 90480 ADMN SARSCOV2 VAC 1/ONLY CMP: CPT | Performed by: FAMILY MEDICINE

## 2024-04-03 RX ORDER — TOLTERODINE 2 MG/1
2 CAPSULE, EXTENDED RELEASE ORAL DAILY
Qty: 90 CAPSULE | Refills: 3 | Status: SHIPPED | OUTPATIENT
Start: 2024-04-03

## 2024-04-03 RX ORDER — LANOLIN ALCOHOL/MO/W.PET/CERES
3 CREAM (GRAM) TOPICAL
COMMUNITY

## 2024-04-03 RX ORDER — PHENTERMINE HYDROCHLORIDE 15 MG/1
15 CAPSULE ORAL EVERY MORNING
Qty: 30 CAPSULE | Refills: 5 | Status: SHIPPED | OUTPATIENT
Start: 2024-04-03 | End: 2024-08-07

## 2024-04-03 RX ORDER — BACLOFEN 20 MG
TABLET ORAL
COMMUNITY

## 2024-04-03 SDOH — HEALTH STABILITY: PHYSICAL HEALTH: ON AVERAGE, HOW MANY DAYS PER WEEK DO YOU ENGAGE IN MODERATE TO STRENUOUS EXERCISE (LIKE A BRISK WALK)?: 5 DAYS

## 2024-04-03 ASSESSMENT — ASTHMA QUESTIONNAIRES
QUESTION_2 LAST FOUR WEEKS HOW OFTEN HAVE YOU HAD SHORTNESS OF BREATH: NOT AT ALL
ACT_TOTALSCORE: 22
QUESTION_1 LAST FOUR WEEKS HOW MUCH OF THE TIME DID YOUR ASTHMA KEEP YOU FROM GETTING AS MUCH DONE AT WORK, SCHOOL OR AT HOME: NONE OF THE TIME
QUESTION_4 LAST FOUR WEEKS HOW OFTEN HAVE YOU USED YOUR RESCUE INHALER OR NEBULIZER MEDICATION (SUCH AS ALBUTEROL): NOT AT ALL
ACT_TOTALSCORE: 22
QUESTION_3 LAST FOUR WEEKS HOW OFTEN DID YOUR ASTHMA SYMPTOMS (WHEEZING, COUGHING, SHORTNESS OF BREATH, CHEST TIGHTNESS OR PAIN) WAKE YOU UP AT NIGHT OR EARLIER THAN USUAL IN THE MORNING: ONCE A WEEK
QUESTION_5 LAST FOUR WEEKS HOW WOULD YOU RATE YOUR ASTHMA CONTROL: WELL CONTROLLED

## 2024-04-03 ASSESSMENT — PAIN SCALES - GENERAL: PAINLEVEL: NO PAIN (0)

## 2024-04-03 ASSESSMENT — SOCIAL DETERMINANTS OF HEALTH (SDOH): HOW OFTEN DO YOU GET TOGETHER WITH FRIENDS OR RELATIVES?: TWICE A WEEK

## 2024-04-03 NOTE — NURSING NOTE
"Chief Complaint   Patient presents with    Wellness Visit     /70   Pulse 72   Temp 98.1  F (36.7  C) (Tympanic)   Resp 16   Ht 1.67 m (5' 5.75\")   Wt 82.1 kg (181 lb)   SpO2 97%   BMI 29.44 kg/m   Estimated body mass index is 29.44 kg/m  as calculated from the following:    Height as of this encounter: 1.67 m (5' 5.75\").    Weight as of this encounter: 82.1 kg (181 lb).  Patient presents to the clinic using No DME      Health Maintenance that is potentially due pending provider review:    Health Maintenance Due   Topic Date Due    ASTHMA ACTION PLAN  02/24/2021    MEDICARE ANNUAL WELLNESS VISIT  03/01/2024    ANNUAL REVIEW OF HM ORDERS  03/01/2024                  "

## 2024-04-03 NOTE — PROGRESS NOTES
"Preventive Care Visit  [facility]  Xiao Dasilva MD, Family Medicine  Apr 3, 2024      Assessment & Plan     Encounter for Medicare annual wellness exam      Urinary urgency  Nocturia   Up 5-6 times   Trial of the below   - tolterodine ER (DETROL LA) 2 MG 24 hr capsule; Take 1 capsule (2 mg) by mouth daily    Overweight  This is helping   - phentermine 15 MG capsule; Take 1 capsule (15 mg) by mouth every morning    High priority for 2019-nCoV vaccine      Patient has been advised of split billing requirements and indicates understanding: Yes          BMI  Estimated body mass index is 29.44 kg/m  as calculated from the following:    Height as of this encounter: 1.67 m (5' 5.75\").    Weight as of this encounter: 82.1 kg (181 lb).   Weight management plan: Discussed healthy diet and exercise guidelines    Counseling  Appropriate preventive services were discussed with this patient, including applicable screening as appropriate for fall prevention, nutrition, physical activity, Tobacco-use cessation, weight loss and cognition.  Checklist reviewing preventive services available has been given to the patient.  Reviewed patient's diet, addressing concerns and/or questions.   Discussed possible causes of fatigue. Information on urinary incontinence and treatment options given to patient.           Subjective   Marcy is a 78 year old, presenting for the following:  Wellness Visit and Imm/Inj (COVID-19 VACCINE)        4/3/2024     2:35 PM   Additional Questions   Roomed by Yanira CARNEY   Accompanied by          4/3/2024     2:35 PM   Patient Reported Additional Medications   Patient reports taking the following new medications .         Health Care Directive  Patient does not have a Health Care Directive or Living Will: Discussed advance care planning with patient; information given to patient to review.    HPI      Genitourinary - Female  Onset/Duration: years   Description:   Painful " urination (Dysuria): No           Frequency: YES- night time only   Blood in urine (Hematuria): No  Delay in urine (Hesitency): No  Intensity: moderate 5-6 times at night disrupting sleep   Progression of Symptoms:  worsening  Accompanying Signs & Symptoms:  Fever/chills: No  Flank pain: No  Nausea and vomiting: No  Vaginal symptoms: none  Abdominal/Pelvic Pain: No  History:   History of frequent UTI s: No  History of kidney stones: No  Sexually Active: N/A  Possibility of pregnancy: No  Precipitating or alleviating factors: None  Therapies tried and outcome:  none          4/3/2024   General Health   How would you rate your overall physical health? Good   Feel stress (tense, anxious, or unable to sleep) To some extent   (!) STRESS CONCERN      4/3/2024   Nutrition   Diet: Gluten-free/reduced         4/3/2024   Exercise   Days per week of moderate/strenous exercise 5 days         4/3/2024   Social Factors   Frequency of gathering with friends or relatives Twice a week   Worry food won't last until get money to buy more No   Food not last or not have enough money for food? No   Do you have housing?  Yes   Are you worried about losing your housing? No   Lack of transportation? No   Unable to get utilities (heat,electricity)? No         4/3/2024   Activities of Daily Living- Home Safety   Needs help with the following daily activites None of the above   Safety concerns in the home None of the above         4/3/2024   Dental   Dentist two times every year? Yes         4/3/2024   Hearing Screening   Hearing concerns? None of the above         4/3/2024   Driving Risk Screening   Patient/family members have concerns about driving No         4/3/2024   General Alertness/Fatigue Screening   Have you been more tired than usual lately? (!) YES         4/3/2024   Urinary Incontinence Screening   Bothered by leaking urine in past 6 months Yes         4/3/2024   TB Screening   Were you born outside of the [place]? No         Today's  PHQ-2 Score:       4/3/2024     2:18 PM   PHQ-2 ( 1999 [employer])   Q1: Little interest or pleasure in doing things 0   Q2: Feeling down, depressed or hopeless 1   PHQ-2 Score 1   Q1: Little interest or pleasure in doing things Not at all   Q2: Feeling down, depressed or hopeless Several days   PHQ-2 Score 1           4/3/2024   Substance Use   Alcohol more than 3/day or more than 7/wk No   Do you have a current opioid prescription? No   How severe/bad is pain from 1 to 10? 5/10   Do you use any other substances recreationally? No     Social History     Tobacco Use    Smoking status: Never    Smokeless tobacco: Never   Vaping Use    Vaping Use: Never used   Substance Use Topics    Alcohol use: Yes     Comment: 2 daily (wine)    Drug use: No           3/1/2023   LAST FHS-7 RESULTS   1st degree relative breast or ovarian cancer Yes   Any relative bilateral breast cancer No   Any male have breast cancer No   Any ONE woman have BOTH breast AND ovarian cancer No   Any woman with breast cancer before 50yrs Yes   2 or more relatives with breast AND/OR ovarian cancer No   2 or more relatives with breast AND/OR bowel cancer No        Mammogram Screening - After age 74- determine frequency with patient based on health status, life expectancy and patient goals    ASCVD Risk   The 10-year ASCVD risk score (Gissell DK, et al., 2019) is: 22.5%    Values used to calculate the score:      Age: 78 years      Sex: Female      Is Non- : No      Diabetic: No      Tobacco smoker: No      Systolic Blood Pressure: 126 mmHg      Is BP treated: No      HDL Cholesterol: 86 mg/dL      Total Cholesterol: 187 mg/dL            Reviewed and updated as needed this visit by Provider   Tobacco  Allergies  Meds  Problems  Med Hx  Surg Hx  Fam Hx              Current providers sharing in care for this patient include:  Patient Care Team:  Xiao Dasilva MD as PCP - General (Family Practice)  Xiao Dasilva  "MD Bret as Assigned PCP  Evelyne Hooper PA-C as Physician Assistant (Dermatology)  Evelyne Milton AuD as Audiologist (Audiology)  Parish Velázquez MD as MD (Otolaryngology)  Evelyne Hooper PA-C as Assigned Surgical Provider    The following health maintenance items are reviewed in Epic and correct as of today:  Health Maintenance   Topic Date Due    ASTHMA ACTION PLAN  02/24/2021    GLUCOSE  08/10/2024    ASTHMA CONTROL TEST  10/03/2024    MEDICARE ANNUAL WELLNESS VISIT  04/03/2025    ANNUAL REVIEW OF HM ORDERS  04/03/2025    FALL RISK ASSESSMENT  04/03/2025    LIPID  08/10/2026    DTAP/TDAP/TD IMMUNIZATION (4 - Td or Tdap) 11/07/2027    ADVANCE CARE PLANNING  03/03/2028    COLORECTAL CANCER SCREENING  01/10/2029    DEXA  04/04/2032    HEPATITIS C SCREENING  Completed    PHQ-2 (once per calendar year)  Completed    INFLUENZA VACCINE  Completed    Pneumococcal Vaccine: 65+ Years  Completed    ZOSTER IMMUNIZATION  Completed    RSV VACCINE (Pregnancy & 60+)  Completed    COVID-19 Vaccine  Completed    IPV IMMUNIZATION  Aged Out    HPV IMMUNIZATION  Aged Out    MENINGITIS IMMUNIZATION  Aged Out    RSV MONOCLONAL ANTIBODY  Aged Out    MAMMO SCREENING  Discontinued         Review of Systems  Constitutional, HEENT, cardiovascular, pulmonary, gi and gu systems are negative, except as otherwise noted.     Objective    Exam  /70   Pulse 72   Temp 98.1  F (36.7  C) (Tympanic)   Resp 16   Ht 1.67 m (5' 5.75\")   Wt 82.1 kg (181 lb)   SpO2 97%   BMI 29.44 kg/m     Estimated body mass index is 29.44 kg/m  as calculated from the following:    Height as of this encounter: 1.67 m (5' 5.75\").    Weight as of this encounter: 82.1 kg (181 lb).    Physical Exam  GENERAL: alert and no distress  EYES: Eyes grossly normal to inspection, PERRL and conjunctivae and sclerae normal  HENT: ear canals and TM's normal, nose and mouth without ulcers or lesions  NECK: no adenopathy, no asymmetry, masses, or scars  RESP: " lungs clear to auscultation - no rales, rhonchi or wheezes  CV: regular rate and rhythm, normal S1 S2, no S3 or S4, no murmur, click or rub, no peripheral edema  ABDOMEN: soft, nontender, no hepatosplenomegaly, no masses and bowel sounds normal  MS: no gross musculoskeletal defects noted, no edema  SKIN: no suspicious lesions or rashes  NEURO: Normal strength and tone, mentation intact and speech normal  PSYCH: mentation appears normal, affect normal/bright        4/3/2024   Mini Cog   Mini-Cog Not Completed (choose reason) Patient declines       Patient declines, there are NO concerns for cognitive deficits.           Signed Electronically by: Xiao Dasilva MD

## 2024-04-05 NOTE — PATIENT INSTRUCTIONS
Preventive Care Advice   This is general advice given by our system to help you stay healthy. However, your care team may have specific advice just for you. Please talk to your care team about your preventive care needs.  Nutrition  Eat 5 or more servings of fruits and vegetables each day.  Try wheat bread, brown rice and whole grain pasta (instead of white bread, rice, and pasta).  Get enough calcium and vitamin D. Check the label on foods and aim for 100% of the RDA (recommended daily allowance).  Lifestyle  Exercise at least 150 minutes each week   (30 minutes a day, 5 days a week).  Do muscle strengthening activities 2 days a week. These help control your weight and prevent disease.  No smoking.  Wear sunscreen to prevent skin cancer.  Have a dental exam and cleaning every 6 months.  Yearly exams  See your health care team every year to talk about:  Any changes in your health.  Any medicines your care team has prescribed.  Preventive care, family planning, and ways to prevent chronic diseases.  Shots (vaccines)   HPV shots (up to age 26), if you've never had them before.  Hepatitis B shots (up to age 59), if you've never had them before.  COVID-19 shot: Get this shot when it's due.  Flu shot: Get a flu shot every year.  Tetanus shot: Get a tetanus shot every 10 years.  Pneumococcal, hepatitis A, and RSV shots: Ask your care team if you need these based on your risk.  Shingles shot (for age 50 and up).  General health tests  Diabetes screening:  Starting at age 35, Get screened for diabetes at least every 3 years.  If you are younger than age 35, ask your care team if you should be screened for diabetes.  Cholesterol test: At age 39, start having a cholesterol test every 5 years, or more often if advised.  Bone density scan (DEXA): At age 50, ask your care team if you should have this scan for osteoporosis (brittle bones).  Hepatitis C: Get tested at least once in your life.  STIs (sexually transmitted  infections)  Before age 24: Ask your care team if you should be screened for STIs.  After age 24: Get screened for STIs if you're at risk. You are at risk for STIs (including HIV) if:  You are sexually active with more than one person.  You don't use condoms every time.  You or a partner was diagnosed with a sexually transmitted infection.  If you are at risk for HIV, ask about PrEP medicine to prevent HIV.  Get tested for HIV at least once in your life, whether you are at risk for HIV or not.  Cancer screening tests  Cervical cancer screening: If you have a cervix, begin getting regular cervical cancer screening tests at age 21. Most people who have regular screenings with normal results can stop after age 65. Talk about this with your provider.  Breast cancer scan (mammogram): If you've ever had breasts, begin having regular mammograms starting at age 40. This is a scan to check for breast cancer.  Colon cancer screening: It is important to start screening for colon cancer at age 45.  Have a colonoscopy test every 10 years (or more often if you're at risk) Or, ask your provider about stool tests like a FIT test every year or Cologuard test every 3 years.  To learn more about your testing options, visit: https://[email or web address]/528095.pdf.  For help making a decision, visit: https://bit.ly/dm30053.  Prostate cancer screening test: If you have a prostate and are age 55 to 69, ask your provider if you would benefit from a yearly prostate cancer screening test.  Lung cancer screening: If you are a current or former smoker age 50 to 80, ask your care team if ongoing lung cancer screenings are right for you.  For informational purposes only. Not to replace the advice of your health care provider. Copyright   2023 [employer]. All rights reserved. Clinically reviewed by the [facility]. [employer] [id number] - REV 01/24.    Learning About Stress  What is stress?     Stress is your  body's response to a hard situation. Your body can have a physical, emotional, or mental response. Stress is a fact of life for most people, and it affects everyone differently. What causes stress for you may not be stressful for someone else.  A lot of things can cause stress. You may feel stress when you go on a job interview, take a test, or run a race. This kind of short-term stress is normal and even useful. It can help you if you need to work hard or react quickly. For example, stress can help you finish an important job on time.  Long-term stress is caused by ongoing stressful situations or events. Examples of long-term stress include long-term health problems, ongoing problems at work, or conflicts in your family. Long-term stress can harm your health.  How does stress affect your health?  When you are stressed, your body responds as though you are in danger. It makes hormones that speed up your heart, make you breathe faster, and give you a burst of energy. This is called the fight-or-flight stress response. If the stress is over quickly, your body goes back to normal and no harm is done.  But if stress happens too often or lasts too long, it can have bad effects. Long-term stress can make you more likely to get sick, and it can make symptoms of some diseases worse. If you tense up when you are stressed, you may develop neck, shoulder, or low back pain. Stress is linked to high blood pressure and heart disease.  Stress also harms your emotional health. It can make you cedeno, tense, or depressed. Your relationships may suffer, and you may not do well at work or school.  What can you do to manage stress?  You can try these things to help manage stress:   Do something active. Exercise or activity can help reduce stress. Walking is a great way to get started. Even everyday activities such as housecleaning or yard work can help.  Try yoga or luis chi. These techniques combine exercise and meditation. You may need  some training at first to learn them.  Do something you enjoy. For example, listen to music or go to a movie. Practice your hobby or do volunteer work.  Meditate. This can help you relax, because you are not worrying about what happened before or what may happen in the future.  Do guided imagery. Imagine yourself in any setting that helps you feel calm. You can use online videos, books, or a teacher to guide you.  Do breathing exercises. For example:  From a standing position, bend forward from the waist with your knees slightly bent. Let your arms dangle close to the floor.  Breathe in slowly and deeply as you return to a standing position. Roll up slowly and lift your head last.  Hold your breath for just a few seconds in the standing position.  Breathe out slowly and bend forward from the waist.  Let your feelings out. Talk, laugh, cry, and express anger when you need to. Talking with supportive friends or family, a counselor, or a ayden leader about your feelings is a healthy way to relieve stress. Avoid discussing your feelings with people who make you feel worse.  Write. It may help to write about things that are bothering you. This helps you find out how much stress you feel and what is causing it. When you know this, you can find better ways to cope.  What can you do to prevent stress?  You might try some of these things to help prevent stress:  Manage your time. This helps you find time to do the things you want and need to do.  Get enough sleep. Your body recovers from the stresses of the day while you are sleeping.  Get support. Your family, friends, and community can make a difference in how you experience stress.  Limit your news feed. Avoid or limit time on social media or news that may make you feel stressed.  Do something active. Exercise or activity can help reduce stress. Walking is a great way to get started.  Where can you learn more?  Go to https://www.healthwise.net/patiented  Enter N032 in the  "search box to learn more about \"Learning About Stress.\"  Current as of: October 24, 2023               Content Version: 14.0    8920-9107 [employer].   Care instructions adapted under license by your healthcare professional. If you have questions about a medical condition or this instruction, always ask your healthcare professional. [employer] disclaims any warranty or liability for your use of this information.      Learning About Sleeping Well  What does sleeping well mean?     Sleeping well means getting enough sleep to feel good and stay healthy. How much sleep is enough varies among people.  The number of hours you sleep and how you feel when you wake up are both important. If you do not feel refreshed, you probably need more sleep. Another sign of not getting enough sleep is feeling tired during the day.  Experts recommend that adults get at least 7 or more hours of sleep per day. Children and older adults need more sleep.  Why is getting enough sleep important?  Getting enough quality sleep is a basic part of good health. When your sleep suffers, your physical health, mood, and your thoughts can suffer too. You may find yourself feeling more grumpy or stressed. Not getting enough sleep also can lead to serious problems, including injury, accidents, anxiety, and depression.  What might cause poor sleeping?  Many things can cause sleep problems, including:  Changes to your sleep schedule.  Stress. Stress can be caused by fear about a single event, such as giving a speech. Or you may have ongoing stress, such as worry about work or school.  Depression, anxiety, and other mental or emotional conditions.  Changes in your sleep habits or surroundings. This includes changes that happen where you sleep, such as noise, light, or sleeping in a different bed. It also includes changes in your sleep pattern, such as having jet lag or working a late shift.  Health problems, such as pain, " "breathing problems, and restless legs syndrome.  Lack of regular exercise.  Using alcohol, nicotine, or caffeine before bed.  How can you help yourself?  Here are some tips that may help you sleep more soundly and wake up feeling more refreshed.  Your sleeping area   Use your bedroom only for sleeping and sex. A bit of light reading may help you fall asleep. But if it doesn't, do your reading elsewhere in the house. Try not to use your TV, computer, smartphone, or tablet while you are in bed.  Be sure your bed is big enough to stretch out comfortably, especially if you have a sleep partner.  Keep your bedroom quiet, dark, and cool. Use curtains, blinds, or a sleep mask to block out light. To block out noise, use earplugs, soothing music, or a \"white noise\" machine.  Your evening and bedtime routine   Create a relaxing bedtime routine. You might want to take a warm shower or bath, or listen to soothing music.  Go to bed at the same time every night. And get up at the same time every morning, even if you feel tired.  What to avoid   Limit caffeine (coffee, tea, caffeinated sodas) during the day, and don't have any for at least 6 hours before bedtime.  Avoid drinking alcohol before bedtime. Alcohol can cause you to wake up more often during the night.  Try not to smoke or use tobacco, especially in the evening. Nicotine can keep you awake.  Limit naps during the day, especially close to bedtime.  Avoid lying in bed awake for too long. If you can't fall asleep or if you wake up in the middle of the night and can't get back to sleep within about 20 minutes, get out of bed and go to another room until you feel sleepy.  Avoid taking medicine right before bed that may keep you awake or make you feel hyper or energized. Your doctor can tell you if your medicine may do this and if you can take it earlier in the day.  If you can't sleep   Imagine yourself in a peaceful, pleasant scene. Focus on the details and feelings of " "being in a place that is relaxing.  Get up and do a quiet or boring activity until you feel sleepy.  Avoid drinking any liquids before going to bed to help prevent waking up often to use the bathroom.  Where can you learn more?  Go to https://www.AnyPresence.net/patiented  Enter J942 in the search box to learn more about \"Learning About Sleeping Well.\"  Current as of: July 10, 2023               Content Version: 14.0    9424-3672 [employer].   Care instructions adapted under license by your healthcare professional. If you have questions about a medical condition or this instruction, always ask your healthcare professional. [employer] disclaims any warranty or liability for your use of this information.      Bladder Training: Care Instructions  Your Care Instructions     Bladder training is used to treat urge incontinence and stress incontinence. Urge incontinence means that the need to urinate comes on so fast that you can't get to a toilet in time. Stress incontinence means that you leak urine because of pressure on your bladder. For example, it may happen when you laugh, cough, or lift something heavy.  Bladder training can increase how long you can wait before you have to urinate. It can also help your bladder hold more urine. And it can give you better control over the urge to urinate.  It is important to remember that bladder training takes a few weeks to a few months to make a difference. You may not see results right away, but don't give up.  Follow-up care is a key part of your treatment and safety. Be sure to make and go to all appointments, and call your doctor if you are having problems. It's also a good idea to know your test results and keep a list of the medicines you take.  How can you care for yourself at home?  Work with your doctor to come up with a bladder training program that is right for you. You may use one or more of the following methods.  Delayed urination  In the " "beginning, try to keep from urinating for 5 minutes after you first feel the need to go.  While you wait, take deep, slow breaths to relax. Kegel exercises can also help you delay the need to go to the bathroom.  After some practice, when you can easily wait 5 minutes to urinate, try to wait 10 minutes before you urinate.  Slowly increase the waiting period until you are able to control when you have to urinate.  Scheduled urination  Empty your bladder when you first wake up in the morning.  Schedule times throughout the day when you will urinate.  Start by going to the bathroom every hour, even if you don't need to go.  Slowly increase the time between trips to the bathroom.  When you have found a schedule that works well for you, keep doing it.  If you wake up during the night and have to urinate, do it. Apply your schedule to waking hours only.  Kegel exercises  These tighten and strengthen pelvic muscles, which can help you control the flow of urine. (If doing these exercises causes pain, stop doing them and talk with your doctor.) To do Kegel exercises:  Squeeze your muscles as if you were trying not to pass gas. Or squeeze your muscles as if you were stopping the flow of urine. Your belly, legs, and buttocks shouldn't move.  Hold the squeeze for 3 seconds, then relax for 5 to 10 seconds.  Start with 3 seconds, then add 1 second each week until you are able to squeeze for 10 seconds.  Repeat the exercise 10 times a session. Do 3 to 8 sessions a day.  When should you call for help?  Watch closely for changes in your health, and be sure to contact your doctor if:    Your incontinence is getting worse.     You do not get better as expected.   Where can you learn more?  Go to https://www.Propertybase.net/patiented  Enter V684 in the search box to learn more about \"Bladder Training: Care Instructions.\"  Current as of: November 15, 2023               Content Version: 14.0    4963-0377 [employer].   Care " instructions adapted under license by your healthcare professional. If you have questions about a medical condition or this instruction, always ask your healthcare professional. [employer] disclaims any warranty or liability for your use of this information.

## 2024-04-08 ENCOUNTER — ANCILLARY PROCEDURE (OUTPATIENT)
Dept: MAMMOGRAPHY | Facility: CLINIC | Age: 79
End: 2024-04-08
Attending: FAMILY MEDICINE
Payer: MEDICARE

## 2024-04-08 DIAGNOSIS — Z12.31 VISIT FOR SCREENING MAMMOGRAM: ICD-10-CM

## 2024-04-08 PROCEDURE — 77067 SCR MAMMO BI INCL CAD: CPT | Mod: TC | Performed by: RADIOLOGY

## 2024-04-08 PROCEDURE — 77063 BREAST TOMOSYNTHESIS BI: CPT | Mod: TC | Performed by: RADIOLOGY

## 2024-04-11 ENCOUNTER — LAB (OUTPATIENT)
Dept: LAB | Facility: CLINIC | Age: 79
End: 2024-04-11
Payer: MEDICARE

## 2024-04-11 DIAGNOSIS — J47.1 BRONCHIECTASIS WITH ACUTE EXACERBATION (H): ICD-10-CM

## 2024-04-11 DIAGNOSIS — Z13.6 CARDIOVASCULAR SCREENING; LDL GOAL LESS THAN 160: ICD-10-CM

## 2024-04-11 LAB
ALBUMIN SERPL BCG-MCNC: 4.4 G/DL (ref 3.5–5.2)
ALP SERPL-CCNC: 70 U/L (ref 40–150)
ALT SERPL W P-5'-P-CCNC: 17 U/L (ref 0–50)
ANION GAP SERPL CALCULATED.3IONS-SCNC: 8 MMOL/L (ref 7–15)
AST SERPL W P-5'-P-CCNC: 23 U/L (ref 0–45)
BILIRUB SERPL-MCNC: 0.5 MG/DL
BUN SERPL-MCNC: 9.1 MG/DL (ref 8–23)
CALCIUM SERPL-MCNC: 9.2 MG/DL (ref 8.8–10.2)
CHLORIDE SERPL-SCNC: 103 MMOL/L (ref 98–107)
CHOLEST SERPL-MCNC: 188 MG/DL
CREAT SERPL-MCNC: 0.73 MG/DL (ref 0.51–0.95)
DEPRECATED HCO3 PLAS-SCNC: 28 MMOL/L (ref 22–29)
EGFRCR SERPLBLD CKD-EPI 2021: 84 ML/MIN/1.73M2
ERYTHROCYTE [DISTWIDTH] IN BLOOD BY AUTOMATED COUNT: 12.5 % (ref 10–15)
FASTING STATUS PATIENT QL REPORTED: YES
GLUCOSE SERPL-MCNC: 97 MG/DL (ref 70–99)
HCT VFR BLD AUTO: 36.8 % (ref 35–47)
HDLC SERPL-MCNC: 76 MG/DL
HGB BLD-MCNC: 12.5 G/DL (ref 11.7–15.7)
LDLC SERPL CALC-MCNC: 99 MG/DL
MCH RBC QN AUTO: 31.6 PG (ref 26.5–33)
MCHC RBC AUTO-ENTMCNC: 34 G/DL (ref 31.5–36.5)
MCV RBC AUTO: 93 FL (ref 78–100)
NONHDLC SERPL-MCNC: 112 MG/DL
PLATELET # BLD AUTO: 191 10E3/UL (ref 150–450)
POTASSIUM SERPL-SCNC: 4.5 MMOL/L (ref 3.4–5.3)
PROT SERPL-MCNC: 7.2 G/DL (ref 6.4–8.3)
RBC # BLD AUTO: 3.95 10E6/UL (ref 3.8–5.2)
SODIUM SERPL-SCNC: 139 MMOL/L (ref 135–145)
TRIGL SERPL-MCNC: 64 MG/DL
WBC # BLD AUTO: 3.9 10E3/UL (ref 4–11)

## 2024-04-11 PROCEDURE — 80053 COMPREHEN METABOLIC PANEL: CPT

## 2024-04-11 PROCEDURE — 36415 COLL VENOUS BLD VENIPUNCTURE: CPT

## 2024-04-11 PROCEDURE — 80061 LIPID PANEL: CPT

## 2024-04-11 PROCEDURE — 85027 COMPLETE CBC AUTOMATED: CPT

## 2024-04-12 ENCOUNTER — MYC MEDICAL ADVICE (OUTPATIENT)
Dept: FAMILY MEDICINE | Facility: CLINIC | Age: 79
End: 2024-04-12
Payer: MEDICARE

## 2024-04-18 ENCOUNTER — OFFICE VISIT (OUTPATIENT)
Dept: FAMILY MEDICINE | Facility: CLINIC | Age: 79
End: 2024-04-18
Payer: MEDICARE

## 2024-04-18 VITALS
SYSTOLIC BLOOD PRESSURE: 122 MMHG | HEART RATE: 75 BPM | WEIGHT: 185.4 LBS | RESPIRATION RATE: 18 BRPM | DIASTOLIC BLOOD PRESSURE: 78 MMHG | BODY MASS INDEX: 30.15 KG/M2 | OXYGEN SATURATION: 98 %

## 2024-04-18 DIAGNOSIS — M25.512 CHRONIC LEFT SHOULDER PAIN: Primary | ICD-10-CM

## 2024-04-18 DIAGNOSIS — G89.29 CHRONIC LEFT SHOULDER PAIN: Primary | ICD-10-CM

## 2024-04-18 PROCEDURE — 20610 DRAIN/INJ JOINT/BURSA W/O US: CPT | Mod: LT | Performed by: PHYSICIAN ASSISTANT

## 2024-04-18 RX ORDER — TRIAMCINOLONE ACETONIDE 40 MG/ML
40 INJECTION, SUSPENSION INTRA-ARTICULAR; INTRAMUSCULAR ONCE
Status: COMPLETED | OUTPATIENT
Start: 2024-04-18 | End: 2024-04-18

## 2024-04-18 RX ADMIN — TRIAMCINOLONE ACETONIDE 40 MG: 40 INJECTION, SUSPENSION INTRA-ARTICULAR; INTRAMUSCULAR at 11:08

## 2024-04-18 ASSESSMENT — PAIN SCALES - GENERAL: PAINLEVEL: SEVERE PAIN (7)

## 2024-04-18 NOTE — PROGRESS NOTES
Assessment & Plan   Chronic left shoulder pain  Here for shoulder injection. Has limited ROM in abduction due to pain. Tried PT at home without improvement. Subacromial injection done in the usual fashion, which a lateral approach. Pt tolerated the procedure well. ROM improved immediately after injection. RTC prn for any new, changing or worsening symptoms.    - DRAIN/INJECT LARGE JOINT/BURSA  - lidocaine 1 % 4 mL  - triamcinolone (KENALOG-40) injection 40 mg    Dhaval Vidal is a 78 year old, presenting for the following health issues:  Shoulder        4/18/2024    10:49 AM   Additional Questions   Roomed by Ivelisse MORENO CMA   Accompanied by Self       History of Present Illness       Reason for visit:  Steroid shot    She eats 2-3 servings of fruits and vegetables daily.She consumes 1 sweetened beverage(s) daily.She exercises with enough effort to increase her heart rate 30 to 60 minutes per day.  She exercises with enough effort to increase her heart rate 6 days per week.   She is taking medications regularly.       ROS:  See HPI       Objective    There were no vitals taken for this visit.  There is no height or weight on file to calculate BMI.  Physical Exam   Constitutional: healthy, alert, and no distress  Head: Normocephalic. Atraumatic  Eyes: No conjunctival injection, sclera anicteric  Respiratory: No resp distress.  Musculoskeletal: extremities normal- no gross deformities noted, and normal muscle tone  Neurologic: Gait normal. CN 2-12 grossly intact  Psychiatric: mentation appears normal and affect normal/bright     Procedure Note: Shoulder Injection  - Options for treatment, risks, benefits discussed. Potential complications of injection including tendon damage, skin atrophy (thinning of skin and lighter skin color) and infection.  - Informed consent obtained.   - Shoulder landmarks noted. The area for injection was prepped with alcohol swab. 1 ml of Kenalog and 4 ml of 1% Lidocaine without Epi was  injected into the subacromial space. No complications. Bandaid dressing applied.   - There may be some discomfort in the next 24 hours after injection. Benefit should be apparant in the next 3 days or so.    - Relative rest for the area is recommended for the next 3 days.        Signed Electronically by: Cj Mercer PA-C

## 2024-05-15 DIAGNOSIS — F41.9 ANXIETY: ICD-10-CM

## 2024-05-15 RX ORDER — LORAZEPAM 0.5 MG/1
0.5 TABLET ORAL EVERY 8 HOURS PRN
Qty: 20 TABLET | Refills: 0 | Status: SHIPPED | OUTPATIENT
Start: 2024-05-15 | End: 2024-07-26

## 2024-07-22 ENCOUNTER — TELEPHONE (OUTPATIENT)
Dept: FAMILY MEDICINE | Facility: CLINIC | Age: 79
End: 2024-07-22
Payer: MEDICARE

## 2024-07-22 NOTE — TELEPHONE ENCOUNTER
Reason for Call:  Appointment Request    Patient requesting this type of appt: Procedure: growth on finger    Requested provider: Xiao Dasilva    Reason patient unable to be scheduled: Not within requested timeframe    When does patient want to be seen/preferred time:  anytime     Comments: Pt is calling to see if she can get a sooner appt than September for growth removal on her finger. Please call and advise. Thanks    Could we send this information to you in [employer] or would you prefer to receive a phone call?:   Patient would prefer a phone call   Okay to leave a detailed message?: Yes at Cell number on file:    Telephone Information:   Mobile [phone]       Call taken on 7/22/2024 at 4:10 PM by Philly Rosas

## 2024-07-25 DIAGNOSIS — J47.1 BRONCHIECTASIS WITH ACUTE EXACERBATION (H): ICD-10-CM

## 2024-07-25 DIAGNOSIS — F41.9 ANXIETY: ICD-10-CM

## 2024-07-26 RX ORDER — LORAZEPAM 0.5 MG/1
0.5 TABLET ORAL EVERY 8 HOURS PRN
Qty: 20 TABLET | Refills: 0 | Status: SHIPPED | OUTPATIENT
Start: 2024-07-26 | End: 2024-09-03

## 2024-07-26 RX ORDER — AZITHROMYCIN 250 MG/1
TABLET, FILM COATED ORAL
Qty: 6 TABLET | Refills: 2 | Status: SHIPPED | OUTPATIENT
Start: 2024-07-26

## 2024-08-07 ENCOUNTER — OFFICE VISIT (OUTPATIENT)
Dept: FAMILY MEDICINE | Facility: CLINIC | Age: 79
End: 2024-08-07
Payer: MEDICARE

## 2024-08-07 VITALS
RESPIRATION RATE: 16 BRPM | HEIGHT: 66 IN | TEMPERATURE: 98 F | HEART RATE: 82 BPM | WEIGHT: 179 LBS | SYSTOLIC BLOOD PRESSURE: 122 MMHG | DIASTOLIC BLOOD PRESSURE: 62 MMHG | BODY MASS INDEX: 28.77 KG/M2 | OXYGEN SATURATION: 98 %

## 2024-08-07 DIAGNOSIS — B37.2 YEAST INFECTION OF THE SKIN: ICD-10-CM

## 2024-08-07 DIAGNOSIS — B07.9 VIRAL WARTS, UNSPECIFIED TYPE: ICD-10-CM

## 2024-08-07 DIAGNOSIS — E66.3 OVERWEIGHT: Primary | ICD-10-CM

## 2024-08-07 PROCEDURE — 99213 OFFICE O/P EST LOW 20 MIN: CPT | Mod: 25 | Performed by: FAMILY MEDICINE

## 2024-08-07 PROCEDURE — 17110 DESTRUCTION B9 LES UP TO 14: CPT | Performed by: FAMILY MEDICINE

## 2024-08-07 RX ORDER — FLUCONAZOLE 100 MG/1
100 TABLET ORAL DAILY
Qty: 10 TABLET | Refills: 0 | Status: SHIPPED | OUTPATIENT
Start: 2024-08-07 | End: 2024-08-22

## 2024-08-07 RX ORDER — PHENTERMINE HYDROCHLORIDE 15 MG/1
15 CAPSULE ORAL EVERY MORNING
Qty: 30 CAPSULE | Refills: 5 | Status: SHIPPED | OUTPATIENT
Start: 2024-08-07

## 2024-08-07 ASSESSMENT — PAIN SCALES - GENERAL: PAINLEVEL: NO PAIN (0)

## 2024-08-07 NOTE — PROGRESS NOTES
"  Assessment & Plan     Overweight  Stable no change in treatment plan.   - phentermine 15 MG capsule; Take 1 capsule (15 mg) by mouth every morning    Yeast infection of the skin  Trial of the below  - fluconazole (DIFLUCAN) 100 MG tablet; Take 1 tablet (100 mg) by mouth daily for 15 days    Viral warts, unspecified type  Treated with cryotherapy                 Subjective   Marcy is a 79 year old, presenting for the following health issues:  Wart and Derm Problem        8/7/2024     1:18 PM   Additional Questions   Roomed by randy   Accompanied by self     History of Present Illness       Reason for visit:  Wart back itching tinavericolor    She eats 2-3 servings of fruits and vegetables daily.She consumes 0 sweetened beverage(s) daily.She exercises with enough effort to increase her heart rate 30 to 60 minutes per day.  She exercises with enough effort to increase her heart rate 6 days per week.   She is taking medications regularly.     Wart  Right index finger   Was gone and now back   Has a wart on pointer finger on right hand       Rash   On back   Itching and red patches   Wants something for this     Overweight   Refill on phentermine- no side effects and said its working well for her         The longitudinal plan of care for the diagnosis(es)/condition(s) as documented were addressed during this visit. Due to the added complexity in care, I will continue to support Marcy in the subsequent management and with ongoing continuity of care.        Review of Systems  Constitutional, HEENT, cardiovascular, pulmonary, gi and gu systems are negative, except as otherwise noted.      Objective    /62   Pulse 82   Temp 98  F (36.7  C) (Tympanic)   Resp 16   Ht 1.67 m (5' 5.75\")   Wt 81.2 kg (179 lb)   SpO2 98%   BMI 29.11 kg/m    Body mass index is 29.11 kg/m .  Physical Exam   GENERAL: alert and no distress  RESP: lungs clear to auscultation - no rales, rhonchi or wheezes  CV: regular rate and rhythm, " normal S1 S2, no S3 or S4, no murmur, click or rub, no peripheral edema   SKIN: 1 wart -  right index finger  and rash on back with red patchy plaques   PSYCH: mentation appears normal, affect normal/bright      Treated cryotherapy in a freeze thaw cycle time two. patient tolerated the procedure. Basic wound care instructions given.           Signed Electronically by: Xiao Dasilva MD

## 2024-08-12 ENCOUNTER — TELEPHONE (OUTPATIENT)
Dept: FAMILY MEDICINE | Facility: CLINIC | Age: 79
End: 2024-08-12
Payer: MEDICARE

## 2024-08-12 NOTE — TELEPHONE ENCOUNTER
Reason for Call:  Appointment Request    Patient requesting this type of appt: Chronic Diease Management/Medication/Follow-Up    Requested provider: Xiao Dasilva    Reason patient unable to be scheduled: Not within requested timeframe    When does patient want to be seen/preferred time: 1-2 weeks    Comments: PT calling for a sooner follow up w/ Dr. Dasilva for wart follow up, and requesting to see if her spouse Marcelo Queen can be seen the same day. Pls call and advise. Thanks.     Could we send this information to you in [facility] or would you prefer to receive a phone call?:   Patient would prefer a phone call   Okay to leave a detailed message?: Yes at Cell number on file:    Telephone Information:   Mobile [phone]       Call taken on 8/12/2024 at 5:12 PM by Philly Rosas

## 2024-08-13 NOTE — TELEPHONE ENCOUNTER
Called and spoke with patient. Scheduled an appointment for her .      Patient was in on 8/7 for a wart and was told to come back in 1-2 weeks. Appointment was not made for follow up while in clinic on 8/7.     Routing to provider, does she need a follow up with PCP? Ok to use [facility] on 8/21 (only open spot/no same days available)     Patient had to go to a meeting. Patient will call back later to schedule

## 2024-09-03 ENCOUNTER — OFFICE VISIT (OUTPATIENT)
Dept: FAMILY MEDICINE | Facility: CLINIC | Age: 79
End: 2024-09-03
Payer: MEDICARE

## 2024-09-03 VITALS
HEART RATE: 80 BPM | DIASTOLIC BLOOD PRESSURE: 80 MMHG | WEIGHT: 183.6 LBS | OXYGEN SATURATION: 96 % | RESPIRATION RATE: 14 BRPM | SYSTOLIC BLOOD PRESSURE: 110 MMHG | TEMPERATURE: 97.9 F | BODY MASS INDEX: 29.86 KG/M2

## 2024-09-03 DIAGNOSIS — F41.9 ANXIETY: Primary | ICD-10-CM

## 2024-09-03 DIAGNOSIS — B07.9 VIRAL WARTS, UNSPECIFIED TYPE: ICD-10-CM

## 2024-09-03 PROCEDURE — 17110 DESTRUCTION B9 LES UP TO 14: CPT | Performed by: FAMILY MEDICINE

## 2024-09-03 PROCEDURE — 99213 OFFICE O/P EST LOW 20 MIN: CPT | Mod: 25 | Performed by: FAMILY MEDICINE

## 2024-09-03 PROCEDURE — 96127 BRIEF EMOTIONAL/BEHAV ASSMT: CPT | Performed by: FAMILY MEDICINE

## 2024-09-03 RX ORDER — LORAZEPAM 0.5 MG/1
0.5 TABLET ORAL EVERY 8 HOURS PRN
Qty: 20 TABLET | Refills: 0 | Status: SHIPPED | OUTPATIENT
Start: 2024-09-03

## 2024-09-03 ASSESSMENT — ASTHMA QUESTIONNAIRES: ACT_TOTALSCORE: 25

## 2024-09-03 ASSESSMENT — PAIN SCALES - GENERAL: PAINLEVEL: NO PAIN (0)

## 2024-09-03 NOTE — NURSING NOTE
"Chief Complaint   Patient presents with    Wart     /80   Pulse 80   Temp 97.9  F (36.6  C) (Tympanic)   Resp 14   Wt 83.3 kg (183 lb 9.6 oz)   SpO2 96%   BMI 29.86 kg/m   Estimated body mass index is 29.86 kg/m  as calculated from the following:    Height as of 8/7/24: 1.67 m (5' 5.75\").    Weight as of this encounter: 83.3 kg (183 lb 9.6 oz).  Patient presents to the clinic using No DME      Health Maintenance that is potentially due pending provider review:    Health Maintenance Due   Topic Date Due    ASTHMA ACTION PLAN  02/24/2021    INFLUENZA VACCINE (1) 09/01/2024    ASTHMA CONTROL TEST  10/03/2024                  "

## 2024-09-03 NOTE — PROGRESS NOTES
Assessment & Plan     Anxiety  Still situational stress with business and  having a stroke   If this is not resolving in the next 4-6 weeks I would rec we start sertraline  We did discuss and she is in agreement   She will let me know via mychart   - LORazepam (ATIVAN) 0.5 MG tablet; Take 1 tablet (0.5 mg) by mouth every 8 hours as needed for anxiety.    Viral warts, unspecified type  Improved   - DESTRUCT BENIGN LESION, UP TO 14            Subjective   Marcy is a 79 year old, presenting for the following health issues:  Wart        9/3/2024     8:55 AM   Additional Questions   Roomed by Yanira CARNEY   Accompanied by Self         9/3/2024     8:55 AM   Patient Reported Additional Medications   Patient reports taking the following new medications .     History of Present Illness       Reason for visit:  Wart removal and refill    She eats 2-3 servings of fruits and vegetables daily.She consumes 0 sweetened beverage(s) daily.She exercises with enough effort to increase her heart rate 30 to 60 minutes per day.  She exercises with enough effort to increase her heart rate 5 days per week.   She is taking medications regularly.         Warts  Onset/Duration: 3-4 months  Description (location/number): right hand first finger  Accompanying signs and symptoms (pain, redness):  no   History: prior warts: when pt was younger  Therapies tried and outcome: liquid nitrogen      Anxiety   How are you doing with your anxiety since your last visit? Up and down   Are you having other symptoms that might be associated with anxiety? Yes:  insomnia and palpitations   Have you had a significant life event? Financial Concerns, Grief or Loss, and Health Concerns   Are you feeling depressed? No  Do you have any concerns with your use of alcohol or other drugs? No    Social History     Tobacco Use    Smoking status: Never    Smokeless tobacco: Never   Vaping Use    Vaping status: Never Used   Substance Use Topics    Alcohol use: Yes      Comment: 2 daily (wine)    Drug use: No         5/4/2018    11:03 AM 5/4/2018    11:04 AM 3/19/2020     4:23 PM   JARETT-7 SCORE   Total Score 0 (minimal anxiety)  3 (minimal anxiety)   Total Score  0 3         4/19/2017     1:51 PM 4/19/2017     1:57 PM 5/4/2018    11:02 AM   PHQ   PHQ-9 Total Score 5 6 2   Q9: Thoughts of better off dead/self-harm past 2 weeks Not at all Not at all Not at all             Review of Systems  Constitutional, HEENT, cardiovascular, pulmonary, gi and gu systems are negative, except as otherwise noted.      Objective    /80   Pulse 80   Temp 97.9  F (36.6  C) (Tympanic)   Resp 14   Wt 83.3 kg (183 lb 9.6 oz)   SpO2 96%   BMI 29.86 kg/m    Body mass index is 29.86 kg/m .  Physical Exam   GENERAL: alert and no distress  SKIN: 1 wart - fingers right index     Pared using a 15 blade and cryotherapy in a freeze thaw cycle time two. patient tolerated the procedure. Basic wound care instructions given.    PSYCH: mentation appears normal, affect normal/bright            Signed Electronically by: Xiao Dasilva MD

## 2024-11-06 ENCOUNTER — MYC MEDICAL ADVICE (OUTPATIENT)
Dept: FAMILY MEDICINE | Facility: CLINIC | Age: 79
End: 2024-11-06
Payer: MEDICARE

## 2024-11-06 DIAGNOSIS — F41.9 ANXIETY: ICD-10-CM

## 2024-11-11 DIAGNOSIS — H60.8X3 CHRONIC ECZEMATOUS OTITIS EXTERNA OF BOTH EARS: ICD-10-CM

## 2024-11-11 RX ORDER — LORAZEPAM 0.5 MG/1
0.5 TABLET ORAL EVERY 8 HOURS PRN
Qty: 20 TABLET | Refills: 0 | Status: SHIPPED | OUTPATIENT
Start: 2024-11-11

## 2024-11-11 RX ORDER — SERTRALINE HYDROCHLORIDE 25 MG/1
25 TABLET, FILM COATED ORAL DAILY
Qty: 90 TABLET | Refills: 1 | Status: SHIPPED | OUTPATIENT
Start: 2024-11-11

## 2024-11-11 RX ORDER — FLUOCINOLONE ACETONIDE 0.11 MG/ML
5 OIL AURICULAR (OTIC) 2 TIMES DAILY
Qty: 3.5 ML | Refills: 11 | Status: SHIPPED | OUTPATIENT
Start: 2024-11-11

## 2024-11-11 NOTE — TELEPHONE ENCOUNTER
Requested Prescriptions   Pending Prescriptions Disp Refills    fluocinolone acetonide oil 0.01 % ear drops 3.5 mL 11     Sig: Place 0.25 mLs (5 drops) into both ears 2 times daily.       There is no refill protocol information for this order

## 2024-11-19 ENCOUNTER — MYC MEDICAL ADVICE (OUTPATIENT)
Dept: FAMILY MEDICINE | Facility: CLINIC | Age: 79
End: 2024-11-19
Payer: MEDICARE

## 2024-12-11 ENCOUNTER — OFFICE VISIT (OUTPATIENT)
Dept: FAMILY MEDICINE | Facility: CLINIC | Age: 79
End: 2024-12-11
Payer: MEDICARE

## 2024-12-11 VITALS
HEART RATE: 86 BPM | OXYGEN SATURATION: 98 % | DIASTOLIC BLOOD PRESSURE: 82 MMHG | TEMPERATURE: 97.2 F | HEIGHT: 66 IN | SYSTOLIC BLOOD PRESSURE: 136 MMHG | WEIGHT: 182 LBS | RESPIRATION RATE: 14 BRPM | BODY MASS INDEX: 29.25 KG/M2

## 2024-12-11 DIAGNOSIS — R11.0 NAUSEA: ICD-10-CM

## 2024-12-11 DIAGNOSIS — F41.9 ANXIETY: Primary | ICD-10-CM

## 2024-12-11 DIAGNOSIS — B07.9 VIRAL WARTS, UNSPECIFIED TYPE: ICD-10-CM

## 2024-12-11 RX ORDER — LATANOPROST 50 UG/ML
1 SOLUTION/ DROPS OPHTHALMIC
COMMUNITY
Start: 2024-11-27

## 2024-12-11 RX ORDER — BUSPIRONE HYDROCHLORIDE 5 MG/1
5 TABLET ORAL AT BEDTIME
Qty: 90 TABLET | Refills: 3 | Status: SHIPPED | OUTPATIENT
Start: 2024-12-11

## 2024-12-11 RX ORDER — ONDANSETRON 4 MG/1
4 TABLET, FILM COATED ORAL EVERY 8 HOURS PRN
Qty: 10 TABLET | Refills: 0 | Status: SHIPPED | OUTPATIENT
Start: 2024-12-11

## 2024-12-11 RX ORDER — LORAZEPAM 0.5 MG/1
0.5 TABLET ORAL EVERY 8 HOURS PRN
Qty: 20 TABLET | Refills: 0 | Status: SHIPPED | OUTPATIENT
Start: 2024-12-11

## 2024-12-11 ASSESSMENT — ENCOUNTER SYMPTOMS: NERVOUS/ANXIOUS: 1

## 2024-12-11 ASSESSMENT — PAIN SCALES - GENERAL: PAINLEVEL_OUTOF10: NO PAIN (0)

## 2024-12-11 NOTE — PROGRESS NOTES
"  Assessment & Plan     Anxiety  Not well controlled   Will add buspar and continue prn use of ativan     - busPIRone (BUSPAR) 5 MG tablet; Take 1 tablet (5 mg) by mouth at bedtime.  - LORazepam (ATIVAN) 0.5 MG tablet; Take 1 tablet (0.5 mg) by mouth every 8 hours as needed for anxiety.    Nausea  Occasional due to viral would like this on hand   - ondansetron (ZOFRAN) 4 MG tablet; Take 1 tablet (4 mg) by mouth every 8 hours as needed for nausea.    Viral warts, unspecified type    - DESTRUCT BENIGN LESION, UP TO 14          BMI  Estimated body mass index is 29.6 kg/m  as calculated from the following:    Height as of this encounter: 1.67 m (5' 5.75\").    Weight as of this encounter: 82.6 kg (182 lb).             Dhaval Vidal is a 79 year old, presenting for the following health issues:  Anxiety and Wart        12/11/2024     3:12 PM   Additional Questions   Roomed by Yanira CARNEY   Accompanied by Self         12/11/2024     3:12 PM   Patient Reported Additional Medications   Patient reports taking the following new medications .     History of Present Illness       Reason for visit:  Review medication and wart removal    She eats 0-1 servings of fruits and vegetables daily.She consumes 0 sweetened beverage(s) daily.She exercises with enough effort to increase her heart rate 30 to 60 minutes per day.  She exercises with enough effort to increase her heart rate 6 days per week.   She is taking medications regularly.     Anxiety  Still situational stress with business and  having a stroke   If this is not resolving in the next 4-6 weeks I would rec we start sertraline  We did discuss and she is in agreement   She will let me know via mychart   Started zoloft but had side effects of stomach problems, gi cramping, made her depressed      Warts  Onset/Duration: ongoing  Description (location/number): finger, right hand first finger            Review of Systems  Constitutional, HEENT, cardiovascular, pulmonary, gi " "and gu systems are negative, except as otherwise noted.      Objective    /82   Pulse 86   Temp 97.2  F (36.2  C) (Tympanic)   Resp 14   Ht 1.67 m (5' 5.75\")   Wt 82.6 kg (182 lb)   SpO2 98%   BMI 29.60 kg/m    Body mass index is 29.6 kg/m .  Physical Exam   GENERAL: alert and no distress  SKIN: wart large 7mm index finger right     Pared using a 15 blade and cryotherapy in a freeze thaw cycle time two. patient tolerated the procedure. Basic wound care instructions given.    PSYCH: mentation appears normal, affect normal/bright            Signed Electronically by: Xiao Dasilva MD    "

## 2024-12-11 NOTE — NURSING NOTE
"Chief Complaint   Patient presents with    Anxiety    Wart     /82   Pulse 86   Temp 97.2  F (36.2  C) (Tympanic)   Resp 14   Ht 1.67 m (5' 5.75\")   Wt 82.6 kg (182 lb)   SpO2 98%   BMI 29.60 kg/m   Estimated body mass index is 29.6 kg/m  as calculated from the following:    Height as of this encounter: 1.67 m (5' 5.75\").    Weight as of this encounter: 82.6 kg (182 lb).  Patient presents to the clinic using No DME      Health Maintenance that is potentially due pending provider review:    Health Maintenance Due   Topic Date Due    ASTHMA ACTION PLAN  02/24/2021                  "

## 2025-02-04 ENCOUNTER — OFFICE VISIT (OUTPATIENT)
Dept: FAMILY MEDICINE | Facility: CLINIC | Age: 80
End: 2025-02-04
Payer: MEDICARE

## 2025-02-04 VITALS
BODY MASS INDEX: 29.89 KG/M2 | WEIGHT: 186 LBS | RESPIRATION RATE: 14 BRPM | HEIGHT: 66 IN | HEART RATE: 74 BPM | DIASTOLIC BLOOD PRESSURE: 72 MMHG | TEMPERATURE: 97 F | SYSTOLIC BLOOD PRESSURE: 150 MMHG | OXYGEN SATURATION: 97 %

## 2025-02-04 DIAGNOSIS — B07.9 VIRAL WARTS, UNSPECIFIED TYPE: Primary | ICD-10-CM

## 2025-02-04 DIAGNOSIS — E66.811 CLASS 1 OBESITY WITH SERIOUS COMORBIDITY AND BODY MASS INDEX (BMI) OF 30.0 TO 30.9 IN ADULT, UNSPECIFIED OBESITY TYPE: ICD-10-CM

## 2025-02-04 DIAGNOSIS — B37.2 YEAST INFECTION OF THE SKIN: ICD-10-CM

## 2025-02-04 DIAGNOSIS — M20.42 HAMMER TOES OF BOTH FEET: ICD-10-CM

## 2025-02-04 DIAGNOSIS — F41.9 ANXIETY: ICD-10-CM

## 2025-02-04 DIAGNOSIS — M20.41 HAMMER TOES OF BOTH FEET: ICD-10-CM

## 2025-02-04 PROCEDURE — 99214 OFFICE O/P EST MOD 30 MIN: CPT | Performed by: FAMILY MEDICINE

## 2025-02-04 PROCEDURE — G2211 COMPLEX E/M VISIT ADD ON: HCPCS | Performed by: FAMILY MEDICINE

## 2025-02-04 RX ORDER — PHENTERMINE HYDROCHLORIDE 30 MG/1
30 CAPSULE ORAL EVERY MORNING
Qty: 30 CAPSULE | Refills: 5 | Status: SHIPPED | OUTPATIENT
Start: 2025-02-04

## 2025-02-04 RX ORDER — PRENATAL VIT 91/IRON/FOLIC/DHA 28-975-200
COMBINATION PACKAGE (EA) ORAL 2 TIMES DAILY
Qty: 42 G | Refills: 5 | Status: SHIPPED | OUTPATIENT
Start: 2025-02-04

## 2025-02-04 RX ORDER — BUSPIRONE HYDROCHLORIDE 5 MG/1
5 TABLET ORAL AT BEDTIME
Qty: 90 TABLET | Refills: 3 | Status: SHIPPED | OUTPATIENT
Start: 2025-02-04

## 2025-02-04 ASSESSMENT — ANXIETY QUESTIONNAIRES
6. BECOMING EASILY ANNOYED OR IRRITABLE: NOT AT ALL
5. BEING SO RESTLESS THAT IT IS HARD TO SIT STILL: NOT AT ALL
3. WORRYING TOO MUCH ABOUT DIFFERENT THINGS: SEVERAL DAYS
8. IF YOU CHECKED OFF ANY PROBLEMS, HOW DIFFICULT HAVE THESE MADE IT FOR YOU TO DO YOUR WORK, TAKE CARE OF THINGS AT HOME, OR GET ALONG WITH OTHER PEOPLE?: NOT DIFFICULT AT ALL
7. FEELING AFRAID AS IF SOMETHING AWFUL MIGHT HAPPEN: SEVERAL DAYS
IF YOU CHECKED OFF ANY PROBLEMS ON THIS QUESTIONNAIRE, HOW DIFFICULT HAVE THESE PROBLEMS MADE IT FOR YOU TO DO YOUR WORK, TAKE CARE OF THINGS AT HOME, OR GET ALONG WITH OTHER PEOPLE: NOT DIFFICULT AT ALL
GAD7 TOTAL SCORE: 3
1. FEELING NERVOUS, ANXIOUS, OR ON EDGE: SEVERAL DAYS
4. TROUBLE RELAXING: NOT AT ALL
GAD7 TOTAL SCORE: 3
2. NOT BEING ABLE TO STOP OR CONTROL WORRYING: NOT AT ALL

## 2025-02-04 ASSESSMENT — PAIN SCALES - GENERAL: PAINLEVEL_OUTOF10: NO PAIN (0)

## 2025-02-04 ASSESSMENT — ASTHMA QUESTIONNAIRES: ACT_TOTALSCORE: 23

## 2025-02-04 ASSESSMENT — ENCOUNTER SYMPTOMS: NERVOUS/ANXIOUS: 1

## 2025-02-04 NOTE — NURSING NOTE
"Chief Complaint   Patient presents with    Depression    Anxiety    Wart     BP (!) 150/72   Pulse 74   Temp 97  F (36.1  C) (Tympanic)   Resp 14   Ht 1.67 m (5' 5.75\")   Wt 84.4 kg (186 lb)   SpO2 97%   BMI 30.25 kg/m   Estimated body mass index is 30.25 kg/m  as calculated from the following:    Height as of this encounter: 1.67 m (5' 5.75\").    Weight as of this encounter: 84.4 kg (186 lb).  Patient presents to the clinic using No DME      Health Maintenance that is potentially due pending provider review:    Health Maintenance Due   Topic Date Due    ASTHMA ACTION PLAN  02/24/2021    ASTHMA CONTROL TEST  03/03/2025    ANNUAL REVIEW OF HM ORDERS  04/03/2025                  "

## 2025-02-04 NOTE — PROGRESS NOTES
"  Assessment & Plan     Viral warts, unspecified type  Getting worse with our treatment   - Adult Dermatology  Referral; Future    Hammer toes of both feet  Painful   Rec silicone toe covers for second toes   - Orthopedic  Referral; Future    Anxiety  Improved   Continue with current meds   - busPIRone (BUSPAR) 5 MG tablet; Take 1 tablet (5 mg) by mouth at bedtime.    Yeast infection of the skin  Continue use of antifungal as needed   - terbinafine (LAMISIL) 1 % external cream; Apply topically 2 times daily.    Class 1 obesity with serious comorbidity and body mass index (BMI) of 30.0 to 30.9 in adult, unspecified obesity type  Increase dose   Tolerating this well   - phentermine 30 MG capsule; Take 1 capsule (30 mg) by mouth every morning.      The longitudinal plan of care for the diagnosis(es)/condition(s) as documented were addressed during this visit. Due to the added complexity in care, I will continue to support Marcy in the subsequent management and with ongoing continuity of care.    BMI  Estimated body mass index is 30.25 kg/m  as calculated from the following:    Height as of this encounter: 1.67 m (5' 5.75\").    Weight as of this encounter: 84.4 kg (186 lb).             Subjective   Marcy is a 79 year old, presenting for the following health issues:  Depression, Anxiety, and Wart        2/4/2025     1:08 PM   Additional Questions   Roomed by Yanira CARNEY   Accompanied by Self         2/4/2025     1:08 PM   Patient Reported Additional Medications   Patient reports taking the following new medications .     History of Present Illness       Mental Health Follow-up:  Patient presents to follow-up on Anxiety.    Patient's anxiety since last visit has been:  Better  The patient is not having other symptoms associated with anxiety.  Any significant life events: grief or loss and health concerns  Patient is not feeling anxious or having panic attacks.  Patient has no concerns about alcohol or drug " "use.    Reason for visit:  Wart removal, bone spur question, medication renewal    She eats 2-3 servings of fruits and vegetables daily.She consumes 0 sweetened beverage(s) daily.She exercises with enough effort to increase her heart rate 30 to 60 minutes per day.  She exercises with enough effort to increase her heart rate 5 days per week.   She is taking medications regularly.     Panic is much less and sleep is better   Feels like meds are working   Sleep is still an issue      Skin rash   Using antifungal  and this is helping     Toe pain both feet   Second toe   She thinks has a bone spur   Corns on this toe as well   Painful with any shoes now    Obesity   Has been on phentermine and doing ok   Gaining some weight   Not feeling the appetite suppression as much any longer             Review of Systems  Constitutional, HEENT, cardiovascular, pulmonary, gi and gu systems are negative, except as otherwise noted.      Objective    BP (!) 150/72   Pulse 74   Temp 97  F (36.1  C) (Tympanic)   Resp 14   Ht 1.67 m (5' 5.75\")   Wt 84.4 kg (186 lb)   SpO2 97%   BMI 30.25 kg/m    Body mass index is 30.25 kg/m .  Physical Exam   GENERAL: alert and no distress  MS: no gross musculoskeletal defects noted, no edema  Hammertoe second toe each foot   SKIN: corns present second toe  PSYCH: mentation appears normal, affect normal/bright            Signed Electronically by: Xiao Dasilva MD    "

## 2025-02-05 ENCOUNTER — PATIENT OUTREACH (OUTPATIENT)
Dept: CARE COORDINATION | Facility: CLINIC | Age: 80
End: 2025-02-05
Payer: MEDICARE

## 2025-02-06 ENCOUNTER — OFFICE VISIT (OUTPATIENT)
Dept: PODIATRY | Facility: CLINIC | Age: 80
End: 2025-02-06
Attending: FAMILY MEDICINE
Payer: MEDICARE

## 2025-02-06 ENCOUNTER — ANCILLARY PROCEDURE (OUTPATIENT)
Dept: GENERAL RADIOLOGY | Facility: CLINIC | Age: 80
End: 2025-02-06
Attending: PODIATRIST
Payer: MEDICARE

## 2025-02-06 VITALS
SYSTOLIC BLOOD PRESSURE: 125 MMHG | HEART RATE: 85 BPM | BODY MASS INDEX: 30.25 KG/M2 | DIASTOLIC BLOOD PRESSURE: 75 MMHG | WEIGHT: 186 LBS

## 2025-02-06 DIAGNOSIS — M20.41 HAMMER TOES OF BOTH FEET: ICD-10-CM

## 2025-02-06 DIAGNOSIS — M20.42 HAMMER TOES OF BOTH FEET: ICD-10-CM

## 2025-02-06 DIAGNOSIS — M20.41 ACQUIRED BILATERAL HAMMER TOES: Primary | ICD-10-CM

## 2025-02-06 DIAGNOSIS — M20.42 ACQUIRED BILATERAL HAMMER TOES: Primary | ICD-10-CM

## 2025-02-06 PROCEDURE — 73630 X-RAY EXAM OF FOOT: CPT | Mod: TC | Performed by: RADIOLOGY

## 2025-02-06 PROCEDURE — 99203 OFFICE O/P NEW LOW 30 MIN: CPT | Performed by: PODIATRIST

## 2025-02-06 NOTE — PATIENT INSTRUCTIONS
Dr. Olmos's Gel Toe Cap  Size: Mini  Quantity: two boxes (4 caps)    [facility]  [address] [place] [id number]  Contact  Phone:  [phone]    [facility]  [address] [place] [address]  Contact  Phone:  [phone]    [facility]  [address] [facility] [place] [address]  Contact  Phone:  [phone]

## 2025-02-06 NOTE — PROGRESS NOTES
PATIENT HISTORY:  Ruba Farmer is a 79 year old female who presents to clinic {[facility] CONSULT:066640} with a chief complaint of ***.  The patient is seen {sjaparent:775295}.  The patient relates the pain is primarily located around the ***.  {Precipitating Event:337077}  The patient relates that the symptoms have been going on for several {DAYS:494715}.  The patient has previously tried different shoes, *** with little relief.  The patient is {OCWORK:340360}.  Any previous notes and studies that pertain to the patient's condition were reviewed.    Pertinent medical, surgical and family history was reviewed in the [facility] chart.    Past Medical History:   Past Medical History:   Diagnosis Date    Bronchitis     pt reports yearly bronchitis    Diagnostic skin and sensitization tests (aka ALLERGENS) 7/22/15 IgE tests all NEGATIVE for environmental allergens.     Nonallergic rhinitis     7/22/15 IgE tests all NEGATIVE for environmental allergens.     Pneumonia     hx of several episodes of pneumonia    Recurrent UTI     Uncomplicated asthma        Medications:   Current Outpatient Medications:     albuterol (PROAIR HFA/PROVENTIL HFA/VENTOLIN HFA) 108 (90 Base) MCG/ACT inhaler, Inhale 2 puffs into the lungs every 6 hours as needed for shortness of breath / dyspnea, Disp: 1 Inhaler, Rfl: 1    Ascorbic Acid (VITAMIN C) 500 MG CAPS, Take 500 mg by mouth daily , Disp: , Rfl:     azithromycin (ZITHROMAX) 250 MG tablet, TAKE 2 TABLETS BY MOUTH ON DAY 1, THEN 1 TABLET DAILY ON DAYS 2 THROUGH 5. (Patient not taking: Reported on 2/4/2025), Disp: 6 tablet, Rfl: 2    busPIRone (BUSPAR) 5 MG tablet, Take 1 tablet (5 mg) by mouth at bedtime., Disp: 90 tablet, Rfl: 3    Cholecalciferol (VITAMIN D) 2000 UNITS CAPS, Take 2,000 Units by mouth daily , Disp: , Rfl:     Fexofenadine HCl (ALLEGRA ALLERGY PO), Take by mouth daily., Disp: , Rfl:     fluocinolone acetonide oil 0.01 % ear drops, Place 0.25 mLs (5 drops) into both ears 2 times  daily., Disp: 3.5 mL, Rfl: 11    ibuprofen (ADVIL/MOTRIN) 200 MG tablet, 1-3 tablets as needed with food, Disp: 120 tablet, Rfl: 0    latanoprost (XALATAN) 0.005 % ophthalmic solution, 1 drop., Disp: , Rfl:     LORazepam (ATIVAN) 0.5 MG tablet, Take 1 tablet (0.5 mg) by mouth every 8 hours as needed for anxiety., Disp: 20 tablet, Rfl: 0    Magnesium Oxide -Mg Supplement 500 MG TABS, , Disp: , Rfl:     melatonin 3 MG tablet, Take 3 mg by mouth nightly as needed for sleep, Disp: , Rfl:     ondansetron (ZOFRAN) 4 MG tablet, Take 1 tablet (4 mg) by mouth every 8 hours as needed for nausea., Disp: 10 tablet, Rfl: 0    phentermine 30 MG capsule, Take 1 capsule (30 mg) by mouth every morning., Disp: 30 capsule, Rfl: 5    POTASSIUM CITRATE PO, Take 1 tablet by mouth daily, Disp: , Rfl:     pseudoePHEDrine (SUDAFED) 30 MG tablet, Take 30 mg by mouth every 4 hours as needed for congestion., Disp: , Rfl:     terbinafine (LAMISIL) 1 % external cream, Apply topically 2 times daily., Disp: 42 g, Rfl: 5     Allergies:    Allergies   Allergen Reactions    Macrobid [Nitrofuran Derivatives] Shortness Of Breath and Other (See Comments)     High fever    Morphine And Codeine Other (See Comments) and Nausea and Vomiting     headache       Vitals: There were no vitals taken for this visit.  BMI= There is no height or weight on file to calculate BMI.    LOWER EXTREMITY PHYSICAL EXAM    Dermatologic: Skin is intact to {RIGHT /LEFT:931462} lower extremity without significant lesions, rash or abrasion.        Vascular: DP & PT pulses are intact & regular on the {RIGHT /LEFT:494068}.   CFT and skin temperature is normal to the {RIGHT /LEFT:749515} lower extremity.     Neurologic: Lower extremity sensation is intact to light touch.  No evidence of weakness in the {RIGHT /LEFT:126362} lower extremity.        Musculoskeletal: Patient is ambulatory without assistive device or brace.  No gross ankle deformity noted.  No foot or ankle joint  effusion is noted.  Noted ***    Diagnostics:  Radiographs included three views of the {RIGHT LEFT BOTH NO:294782} foot demonstrating *** no cortical erosions or periosteal elevation.  All joint margins appear stable.  There is no apparent fracture or tumor formation noted.  There is no evidence of foreign body.  The images were independently reviewed by myself along with the patient explaining the findings.      ASSESSMENT / PLAN:   No diagnosis found.    I have explained to Ruba about the conditions.  We discussed the underlying contributing factors to the condition as well as both conservative and surgical treatment options along with expected length of recovery.  At this time, ***    Ruba verbalized agreement with and understanding of the rational for the diagnosis and treatment plan.  All questions were answered to best of my ability and the patient's satisfaction. The patient was advised to contact the clinic with any questions that may arise after the clinic visit.      Disclaimer: This note consists of symbols derived from keyboarding, dictation and/or voice recognition software. As a result, there may be errors in the script that have gone undetected. Please consider this when interpreting information found in this chart.       DEANN Gurrola D.P.M., FESTELA.F.A.S.

## 2025-02-06 NOTE — LETTER
recognition software. As a result, there may be errors in the script that have gone undetected. Please consider this when interpreting information found in this chart.       MI Haque.P.M., F.A.C.F.A.S.      Again, thank you for allowing me to participate in the care of your patient.        Sincerely,        Albino Gurrola DPM    Electronically signed

## 2025-02-06 NOTE — Clinical Note
2/6/2025      Ruba Farmer  [address] [facility] [id number]      Dear Colleague,    Thank you for referring your patient, Ruba Farmer, to the [facility] [place]. Please see a copy of my visit note below.    PATIENT HISTORY:  Ruba Farmer is a 79 year old female who presents to clinic {[facility] CONSULT:813036} with a chief complaint of ***.  The patient is seen {sjaparent:010804}.  The patient relates the pain is primarily located around the ***.  {Precipitating Event:692621}  The patient relates that the symptoms have been going on for several {DAYS:956689}.  The patient has previously tried different shoes, *** with little relief.  The patient is {FSOCWORK:821302}.  Any previous notes and studies that pertain to the patient's condition were reviewed.    Pertinent medical, surgical and family history was reviewed in the [facility] chart.    Past Medical History:   Past Medical History:   Diagnosis Date    Bronchitis     pt reports yearly bronchitis    Diagnostic skin and sensitization tests (aka ALLERGENS) 7/22/15 IgE tests all NEGATIVE for environmental allergens.     Nonallergic rhinitis     7/22/15 IgE tests all NEGATIVE for environmental allergens.     Pneumonia     hx of several episodes of pneumonia    Recurrent UTI     Uncomplicated asthma        Medications:   Current Outpatient Medications:     albuterol (PROAIR HFA/PROVENTIL HFA/VENTOLIN HFA) 108 (90 Base) MCG/ACT inhaler, Inhale 2 puffs into the lungs every 6 hours as needed for shortness of breath / dyspnea, Disp: 1 Inhaler, Rfl: 1    Ascorbic Acid (VITAMIN C) 500 MG CAPS, Take 500 mg by mouth daily , Disp: , Rfl:     azithromycin (ZITHROMAX) 250 MG tablet, TAKE 2 TABLETS BY MOUTH ON DAY 1, THEN 1 TABLET DAILY ON DAYS 2 THROUGH 5. (Patient not taking: Reported on 2/4/2025), Disp: 6 tablet, Rfl: 2    busPIRone (BUSPAR) 5 MG tablet, Take 1 tablet (5 mg) by mouth at bedtime., Disp: 90 tablet, Rfl: 3    Cholecalciferol  (VITAMIN D) 2000 UNITS CAPS, Take 2,000 Units by mouth daily , Disp: , Rfl:     Fexofenadine HCl (ALLEGRA ALLERGY PO), Take by mouth daily., Disp: , Rfl:     fluocinolone acetonide oil 0.01 % ear drops, Place 0.25 mLs (5 drops) into both ears 2 times daily., Disp: 3.5 mL, Rfl: 11    ibuprofen (ADVIL/MOTRIN) 200 MG tablet, 1-3 tablets as needed with food, Disp: 120 tablet, Rfl: 0    latanoprost (XALATAN) 0.005 % ophthalmic solution, 1 drop., Disp: , Rfl:     LORazepam (ATIVAN) 0.5 MG tablet, Take 1 tablet (0.5 mg) by mouth every 8 hours as needed for anxiety., Disp: 20 tablet, Rfl: 0    Magnesium Oxide -Mg Supplement 500 MG TABS, , Disp: , Rfl:     melatonin 3 MG tablet, Take 3 mg by mouth nightly as needed for sleep, Disp: , Rfl:     ondansetron (ZOFRAN) 4 MG tablet, Take 1 tablet (4 mg) by mouth every 8 hours as needed for nausea., Disp: 10 tablet, Rfl: 0    phentermine 30 MG capsule, Take 1 capsule (30 mg) by mouth every morning., Disp: 30 capsule, Rfl: 5    POTASSIUM CITRATE PO, Take 1 tablet by mouth daily, Disp: , Rfl:     pseudoePHEDrine (SUDAFED) 30 MG tablet, Take 30 mg by mouth every 4 hours as needed for congestion., Disp: , Rfl:     terbinafine (LAMISIL) 1 % external cream, Apply topically 2 times daily., Disp: 42 g, Rfl: 5     Allergies:    Allergies   Allergen Reactions    Macrobid [Nitrofuran Derivatives] Shortness Of Breath and Other (See Comments)     High fever    Morphine And Codeine Other (See Comments) and Nausea and Vomiting     headache       Vitals: There were no vitals taken for this visit.  BMI= There is no height or weight on file to calculate BMI.    LOWER EXTREMITY PHYSICAL EXAM    Dermatologic: Skin is intact to {RIGHT /LEFT:581326} lower extremity without significant lesions, rash or abrasion.        Vascular: DP & PT pulses are intact & regular on the {RIGHT /LEFT:469211}.   CFT and skin temperature is normal to the {RIGHT /LEFT:478058} lower extremity.     Neurologic: Lower extremity  sensation is intact to light touch.  No evidence of weakness in the {RIGHT /LEFT:400027} lower extremity.        Musculoskeletal: Patient is ambulatory without assistive device or brace.  No gross ankle deformity noted.  No foot or ankle joint effusion is noted.  Noted ***    Diagnostics:  Radiographs included three views of the {RIGHT LEFT BOTH NO:639141} foot demonstrating *** no cortical erosions or periosteal elevation.  All joint margins appear stable.  There is no apparent fracture or tumor formation noted.  There is no evidence of foreign body.  The images were independently reviewed by myself along with the patient explaining the findings.      ASSESSMENT / PLAN:   No diagnosis found.    I have explained to Ruba about the conditions.  We discussed the underlying contributing factors to the condition as well as both conservative and surgical treatment options along with expected length of recovery.  At this time, ***    Ruba verbalized agreement with and understanding of the rational for the diagnosis and treatment plan.  All questions were answered to best of my ability and the patient's satisfaction. The patient was advised to contact the clinic with any questions that may arise after the clinic visit.      Disclaimer: This note consists of symbols derived from keyboarding, dictation and/or voice recognition software. As a result, there may be errors in the script that have gone undetected. Please consider this when interpreting information found in this chart.       MI Haque.P.ANGELIKA., F.A.C.F.A.S.        Again, thank you for allowing me to participate in the care of your patient.        Sincerely,        Albino Gurrola DPM    Electronically signed

## 2025-02-10 ENCOUNTER — OFFICE VISIT (OUTPATIENT)
Dept: DERMATOLOGY | Facility: CLINIC | Age: 80
End: 2025-02-10
Attending: FAMILY MEDICINE
Payer: MEDICARE

## 2025-02-10 DIAGNOSIS — D22.9 MULTIPLE MELANOCYTIC NEVI: ICD-10-CM

## 2025-02-10 DIAGNOSIS — L57.0 ACTINIC KERATOSIS: ICD-10-CM

## 2025-02-10 DIAGNOSIS — B07.8 OTHER VIRAL WARTS: Primary | ICD-10-CM

## 2025-02-10 DIAGNOSIS — L82.1 SEBORRHEIC KERATOSIS: ICD-10-CM

## 2025-02-10 DIAGNOSIS — R20.2 NOTALGIA PARESTHETICA: ICD-10-CM

## 2025-02-10 DIAGNOSIS — L81.4 SOLAR LENTIGO: ICD-10-CM

## 2025-02-10 DIAGNOSIS — D18.01 CHERRY ANGIOMA: ICD-10-CM

## 2025-02-10 PROCEDURE — 17110 DESTRUCTION B9 LES UP TO 14: CPT | Performed by: DERMATOLOGY

## 2025-02-10 PROCEDURE — 99214 OFFICE O/P EST MOD 30 MIN: CPT | Mod: 25 | Performed by: DERMATOLOGY

## 2025-02-10 PROCEDURE — 11900 INJECT SKIN LESIONS </W 7: CPT | Mod: 59 | Performed by: DERMATOLOGY

## 2025-02-10 PROCEDURE — 17000 DESTRUCT PREMALG LESION: CPT | Mod: 59 | Performed by: DERMATOLOGY

## 2025-02-10 RX ORDER — CANDIDA ALBICANS 1000 [PNU]/ML
0.3 INJECTION, SOLUTION INTRADERMAL ONCE
Status: COMPLETED | OUTPATIENT
Start: 2025-02-10 | End: 2025-02-10

## 2025-02-10 RX ADMIN — CANDIDA ALBICANS 0.3 ML: 1000 INJECTION, SOLUTION INTRADERMAL at 14:29

## 2025-02-10 NOTE — PATIENT INSTRUCTIONS
For itching:  CeraVe with pramoxine  Sarna with menthol  Capsaicin cream    Salicylic acid 40% for wart

## 2025-02-10 NOTE — NURSING NOTE
Ruba Farmer's goals for this visit include:   Chief Complaint   Patient presents with    Skin Check     FBSC- no concerns other than itching of the skin    Wart     Has a wart on the right first digit that has been previously frozen about 5 times, pt is not seeing a result.       She requests these members of her care team be copied on today's visit information:     PCP: Xiao Dasilva    Referring Provider:  Xiao Dasilva MD  [address] [place] [address]    There were no vitals taken for this visit.    Do you need any medication refills at today's visit?     Lorenza Cazares LPN on 2/10/2025 at 11:21 AM

## 2025-02-10 NOTE — NURSING NOTE
Drug Administration Record    Prior to injection, verified patient identity using patient's name and date of birth.  Due to injection administration, patient instructed to remain in clinic for 15 minutes  afterwards, and to report any adverse reaction to me immediately.    Drug Name: candida antigen  Dose: 0.3mL of candida antigen  Route administered: intradermal  NDC #: 20077-209-43  Amount of waste(mL):0  Reason for waste: Single use vial    LOT #:   SITE: index finger  : [employer]  EXPIRATION DATE: 08-    Lorenza Cazares LPN on 2/10/2025 at 2:28 PM

## 2025-02-10 NOTE — PROGRESS NOTES
[facility] Dermatology Note    Encounter Date: Feb 10, 2025    Dermatology Problem List:  Last FSE: 2/10/25  1. VV,right index finger  2. AK's  - s/p cryo    ______________________________________    Impression/Plan:  1. Verruca Vulgaris, right second finger  - discussed risk/benefit of candida injection in detail  - discussed risk/benefits of topical treatment (imiquimod cream)  - start sal acid cream 4%  After positioning and cleansing with isopropyl alcohol, 0.3 total mL of Candida antigen  mg/mL was injected into 1 lesion(s) on the left hand. The patient tolerated the procedure well and left the dermatology clinic in good condition.    - Cryotherapy procedure note: After verbal consent and discussion of risks and benefits including but no limited to dyspigmentation/scar, blister, and pain, 1 was(were) treated with 1-2mm freeze border for 2 cycles with liquid nitrogen. Post cryotherapy instructions were provided.    2. Reassurance provided for benign lesions not treated today including cherry angiomata, solar lentigines, seborrheic keratoses, and banal-appearing melanocytic nevi.     3. Actinic keratosis x1  - Cryotherapy procedure note: After verbal consent and discussion of risks and benefits including but no limited to dyspigmentation/scar, blister, and pain, 1 was(were) treated with 1-2mm freeze border for 2 cycles with liquid nitrogen. Post cryotherapy instructions were provided.    4. Notalgia paresthetica  - discussed otc topical treatment       Follow-up in 2-3 months for wart treatment.       Staff Involved:  Staff and Scribe    Scribe Disclosure:   I, Faina Flores, am serving as a scribe; to document services personally performed by Corbin Alvarez MD -based on data collection and the provider's statements to me.     Provider Disclosure:   The documentation recorded by the scribe accurately reflects the services I personally performed and the decisions made by me.    Corbin OBRIEN  MD Antonio   of Dermatology  Department of Dermatology  [facility] [employer]        CC:   Chief Complaint   Patient presents with    Skin Check     [facility] [place] no concerns other than itching of the skin    Wart     Has a wart on the right first digit that has been previously frozen about 5 times, pt is not seeing a result.       History of Present Illness:  Ms. Ruba Farmer is a 79 year old female who presents as a return patient for skin check.    Today, has wart on right index finger, which has been treated with liquid nitrogen in the past. She feels that the wart is not resolving. She reports, that she has a itchy back. She has been using over the counter antifungal cream, which has improved the itching.     Labs:  N/a    Physical exam:  Vitals: There were no vitals taken for this visit.  GEN: This is a well developed, well-nourished female in no acute distress, in a pleasant mood.    SKIN: Warner phototype III  - Full skin, which includes the head/face, both arms, chest, back, abdomen,both legs, genitalia and/or groin buttocks, digits and/or nails, was examined.  - There are dome shaped bright red papules on the head/neck, trunk, extremities.   - Multiple regular brown pigmented macules and papules are identified on the head/neck, trunk, extremities.   - Scattered brown macules on sun exposed areas.  - There are waxy stuck on tan to brown papules on the head/neck, trunk, extremities.   - There is a verrucous plaque with thrombosed capillaries interrupting dermatoglyphics on the right second finger.  - there is 1 erythematous scaly macule on the nose  - No other lesions of concern on areas examined.     Past Medical History:   Past Medical History:   Diagnosis Date    Bronchitis     pt reports yearly bronchitis    Diagnostic skin and sensitization tests (aka ALLERGENS) 7/22/15 IgE tests all NEGATIVE for environmental allergens.     Nonallergic rhinitis     7/22/15 IgE  tests all NEGATIVE for environmental allergens.     Pneumonia     hx of several episodes of pneumonia    Recurrent UTI     Uncomplicated asthma      Past Surgical History:   Procedure Laterality Date    ARTHROSCOPY SHOULDER DECOMPRESSION Right 3/2/2017    Procedure: ARTHROSCOPY SHOULDER DECOMPRESSION;  Surgeon: Dallas Rene MD;  Location: [facility]    BIOPSY  2013  ?    COLONOSCOPY  4/6/2007    COLONOSCOPY N/A 1/10/2024    Procedure: COLONOSCOPY, FLEXIBLE, WITH LESION REMOVAL USING SNARE;  Surgeon: Cj Naik DO;  Location: [facility]    COLONOSCOPY N/A 1/10/2024    Procedure: COLONOSCOPY, WITH POLYPECTOMY AND BIOPSY;  Surgeon: Cj Naik DO;  Location: [facility]    HYSTERECTOMY, VAGINAL  1998    PHACOEMULSIFICATION WITH STANDARD INTRAOCULAR LENS IMPLANT  6/17/2013    Procedure: PHACOEMULSIFICATION WITH STANDARD INTRAOCULAR LENS IMPLANT;  Left Kelman phacoemulsification with intraocular lens implant;  Surgeon: Alverto Tijerina MD;  Location: [facility]       Social History:   reports that she has never smoked. She has never used smokeless tobacco. She reports current alcohol use. She reports that she does not use drugs.    Family History:  Family History   Problem Relation Age of Onset    Cancer Mother         endometrail cancer    Heart Disease Mother     Neurologic Disorder Mother         fibromyalgia    Other Cancer Mother     Diabetes Father     Cancer Father         pancreatic cancer    Colon Cancer Father     Heart Disease Maternal Grandmother     Prostate Cancer Maternal Grandfather     Cancer Paternal Grandfather         lung    Genitourinary Problems Paternal Grandfather         dialysis    Other Cancer Paternal Grandfather     Diabetes Brother     Depression Brother     Anxiety Disorder Brother     Mental Illness Brother     Other Cancer Paternal Grandmother     Cancer Son     Other Cancer Son        Medications:  Current Outpatient Medications   Medication Sig Dispense Refill     albuterol (PROAIR HFA/PROVENTIL HFA/VENTOLIN HFA) 108 (90 Base) MCG/ACT inhaler Inhale 2 puffs into the lungs every 6 hours as needed for shortness of breath / dyspnea 1 Inhaler 1    Ascorbic Acid (VITAMIN C) 500 MG CAPS Take 500 mg by mouth daily       busPIRone (BUSPAR) 5 MG tablet Take 1 tablet (5 mg) by mouth at bedtime. 90 tablet 3    Cholecalciferol (VITAMIN D) 2000 UNITS CAPS Take 2,000 Units by mouth daily       Fexofenadine HCl (ALLEGRA ALLERGY PO) Take by mouth daily.      fluocinolone acetonide oil 0.01 % ear drops Place 0.25 mLs (5 drops) into both ears 2 times daily. 3.5 mL 11    ibuprofen (ADVIL/MOTRIN) 200 MG tablet 1-3 tablets as needed with food 120 tablet 0    latanoprost (XALATAN) 0.005 % ophthalmic solution 1 drop.      LORazepam (ATIVAN) 0.5 MG tablet Take 1 tablet (0.5 mg) by mouth every 8 hours as needed for anxiety. 20 tablet 0    Magnesium Oxide -Mg Supplement 500 MG TABS       melatonin 3 MG tablet Take 3 mg by mouth nightly as needed for sleep      ondansetron (ZOFRAN) 4 MG tablet Take 1 tablet (4 mg) by mouth every 8 hours as needed for nausea. 10 tablet 0    phentermine 30 MG capsule Take 1 capsule (30 mg) by mouth every morning. 30 capsule 5    POTASSIUM CITRATE PO Take 1 tablet by mouth daily      pseudoePHEDrine (SUDAFED) 30 MG tablet Take 30 mg by mouth every 4 hours as needed for congestion.      terbinafine (LAMISIL) 1 % external cream Apply topically 2 times daily. 42 g 5    azithromycin (ZITHROMAX) 250 MG tablet TAKE 2 TABLETS BY MOUTH ON DAY 1, THEN 1 TABLET DAILY ON DAYS 2 THROUGH 5. (Patient not taking: Reported on 2/10/2025) 6 tablet 2     Allergies   Allergen Reactions    Macrobid [Nitrofuran Derivatives] Shortness Of Breath and Other (See Comments)     High fever    Morphine And Codeine Other (See Comments) and Nausea and Vomiting     headache

## 2025-02-10 NOTE — LETTER
2/10/2025      Ruba Farmer  [id number] [facility] [id number]      Dear Colleague,    Thank you for referring your patient, Ruba Farmer, to the [facility]. Please see a copy of my visit note below.    [facility] Dermatology Note    Encounter Date: Feb 10, 2025    Dermatology Problem List:  Last FSE: 2/10/25  1. VV,right index finger  2. AK's  - s/p cryo    ______________________________________    Impression/Plan:  1. Verruca Vulgaris, right second finger  - discussed risk/benefit of candida injection in detail  - discussed risk/benefits of topical treatment (imiquimod cream)  - start sal acid cream 4%  After positioning and cleansing with isopropyl alcohol, 0.3 total mL of Candida antigen  mg/mL was injected into 1 lesion(s) on the left hand. The patient tolerated the procedure well and left the dermatology clinic in good condition.    - Cryotherapy procedure note: After verbal consent and discussion of risks and benefits including but no limited to dyspigmentation/scar, blister, and pain, 1 was(were) treated with 1-2mm freeze border for 2 cycles with liquid nitrogen. Post cryotherapy instructions were provided.    2. Reassurance provided for benign lesions not treated today including cherry angiomata, solar lentigines, seborrheic keratoses, and banal-appearing melanocytic nevi.     3. Actinic keratosis x1  - Cryotherapy procedure note: After verbal consent and discussion of risks and benefits including but no limited to dyspigmentation/scar, blister, and pain, 1 was(were) treated with 1-2mm freeze border for 2 cycles with liquid nitrogen. Post cryotherapy instructions were provided.    4. Notalgia paresthetica  - discussed otc topical treatment       Follow-up in 2-3 months for wart treatment.       Staff Involved:  Staff and Scribe    Scribe Disclosure:   Faina TURCIOS, am serving as a scribe; to document services personally performed by Corbin OBRIEN  MD Antonio -based on data collection and the provider's statements to me.     Provider Disclosure:   The documentation recorded by the scribe accurately reflects the services I personally performed and the decisions made by me.    Corbin Alvarez MD   of Dermatology  Department of Dermatology  [facility] [employer]        CC:   Chief Complaint   Patient presents with     Skin Check     [facility] [place] other than itching of the skin     Wart     Has a wart on the right first digit that has been previously frozen about 5 times, pt is not seeing a result.       History of Present Illness:  Ms. Ruba Farmer is a 79 year old female who presents as a return patient for skin check.    Today, has wart on right index finger, which has been treated with liquid nitrogen in the past. She feels that the wart is not resolving. She reports, that she has a itchy back. She has been using over the counter antifungal cream, which has improved the itching.     Labs:  N/a    Physical exam:  Vitals: There were no vitals taken for this visit.  GEN: This is a well developed, well-nourished female in no acute distress, in a pleasant mood.    SKIN: Warner phototype III  - Full skin, which includes the head/face, both arms, chest, back, abdomen,both legs, genitalia and/or groin buttocks, digits and/or nails, was examined.  - There are dome shaped bright red papules on the head/neck, trunk, extremities.   - Multiple regular brown pigmented macules and papules are identified on the head/neck, trunk, extremities.   - Scattered brown macules on sun exposed areas.  - There are waxy stuck on tan to brown papules on the head/neck, trunk, extremities.   - There is a verrucous plaque with thrombosed capillaries interrupting dermatoglyphics on the right second finger.  - there is 1 erythematous scaly macule on the nose  - No other lesions of concern on areas examined.     Past Medical History:   Past  Medical History:   Diagnosis Date     Bronchitis     pt reports yearly bronchitis     Diagnostic skin and sensitization tests (aka ALLERGENS) 7/22/15 IgE tests all NEGATIVE for environmental allergens.      Nonallergic rhinitis     7/22/15 IgE tests all NEGATIVE for environmental allergens.      Pneumonia     hx of several episodes of pneumonia     Recurrent UTI      Uncomplicated asthma      Past Surgical History:   Procedure Laterality Date     ARTHROSCOPY SHOULDER DECOMPRESSION Right 3/2/2017    Procedure: ARTHROSCOPY SHOULDER DECOMPRESSION;  Surgeon: Dallas Rnee MD;  Location: [facility]     BIOPSY  2013  ?     COLONOSCOPY  4/6/2007     COLONOSCOPY N/A 1/10/2024    Procedure: COLONOSCOPY, FLEXIBLE, WITH LESION REMOVAL USING SNARE;  Surgeon: Cj Naik DO;  Location: [facility]     COLONOSCOPY N/A 1/10/2024    Procedure: COLONOSCOPY, WITH POLYPECTOMY AND BIOPSY;  Surgeon: Cj Naik DO;  Location: [facility]     HYSTERECTOMY, VAGINAL  1998     PHACOEMULSIFICATION WITH STANDARD INTRAOCULAR LENS IMPLANT  6/17/2013    Procedure: PHACOEMULSIFICATION WITH STANDARD INTRAOCULAR LENS IMPLANT;  Left Kelman phacoemulsification with intraocular lens implant;  Surgeon: Alverto Tijerina MD;  Location: [facility]       Social History:   reports that she has never smoked. She has never used smokeless tobacco. She reports current alcohol use. She reports that she does not use drugs.    Family History:  Family History   Problem Relation Age of Onset     Cancer Mother         endometrail cancer     Heart Disease Mother      Neurologic Disorder Mother         fibromyalgia     Other Cancer Mother      Diabetes Father      Cancer Father         pancreatic cancer     Colon Cancer Father      Heart Disease Maternal Grandmother      Prostate Cancer Maternal Grandfather      Cancer Paternal Grandfather         lung     Genitourinary Problems Paternal Grandfather         dialysis     Other Cancer Paternal Grandfather       Diabetes Brother      Depression Brother      Anxiety Disorder Brother      Mental Illness Brother      Other Cancer Paternal Grandmother      Cancer Son      Other Cancer Son        Medications:  Current Outpatient Medications   Medication Sig Dispense Refill     albuterol (PROAIR HFA/PROVENTIL HFA/VENTOLIN HFA) 108 (90 Base) MCG/ACT inhaler Inhale 2 puffs into the lungs every 6 hours as needed for shortness of breath / dyspnea 1 Inhaler 1     Ascorbic Acid (VITAMIN C) 500 MG CAPS Take 500 mg by mouth daily        busPIRone (BUSPAR) 5 MG tablet Take 1 tablet (5 mg) by mouth at bedtime. 90 tablet 3     Cholecalciferol (VITAMIN D) 2000 UNITS CAPS Take 2,000 Units by mouth daily        Fexofenadine HCl (ALLEGRA ALLERGY PO) Take by mouth daily.       fluocinolone acetonide oil 0.01 % ear drops Place 0.25 mLs (5 drops) into both ears 2 times daily. 3.5 mL 11     ibuprofen (ADVIL/MOTRIN) 200 MG tablet 1-3 tablets as needed with food 120 tablet 0     latanoprost (XALATAN) 0.005 % ophthalmic solution 1 drop.       LORazepam (ATIVAN) 0.5 MG tablet Take 1 tablet (0.5 mg) by mouth every 8 hours as needed for anxiety. 20 tablet 0     Magnesium Oxide -Mg Supplement 500 MG TABS        melatonin 3 MG tablet Take 3 mg by mouth nightly as needed for sleep       ondansetron (ZOFRAN) 4 MG tablet Take 1 tablet (4 mg) by mouth every 8 hours as needed for nausea. 10 tablet 0     phentermine 30 MG capsule Take 1 capsule (30 mg) by mouth every morning. 30 capsule 5     POTASSIUM CITRATE PO Take 1 tablet by mouth daily       pseudoePHEDrine (SUDAFED) 30 MG tablet Take 30 mg by mouth every 4 hours as needed for congestion.       terbinafine (LAMISIL) 1 % external cream Apply topically 2 times daily. 42 g 5     azithromycin (ZITHROMAX) 250 MG tablet TAKE 2 TABLETS BY MOUTH ON DAY 1, THEN 1 TABLET DAILY ON DAYS 2 THROUGH 5. (Patient not taking: Reported on 2/10/2025) 6 tablet 2     Allergies   Allergen Reactions     Macrobid [Nitrofuran  Derivatives] Shortness Of Breath and Other (See Comments)     High fever     Morphine And Codeine Other (See Comments) and Nausea and Vomiting     headache     Again, thank you for allowing me to participate in the care of your patient.        Sincerely,    Corbin Alvarez MD    Electronically signed

## 2025-04-13 ENCOUNTER — MYC MEDICAL ADVICE (OUTPATIENT)
Dept: DERMATOLOGY | Facility: CLINIC | Age: 80
End: 2025-04-13
Payer: MEDICARE

## 2025-04-13 SDOH — HEALTH STABILITY: PHYSICAL HEALTH: ON AVERAGE, HOW MANY MINUTES DO YOU ENGAGE IN EXERCISE AT THIS LEVEL?: 60 MIN

## 2025-04-13 SDOH — HEALTH STABILITY: PHYSICAL HEALTH: ON AVERAGE, HOW MANY DAYS PER WEEK DO YOU ENGAGE IN MODERATE TO STRENUOUS EXERCISE (LIKE A BRISK WALK)?: 5 DAYS

## 2025-04-13 ASSESSMENT — ASTHMA QUESTIONNAIRES
QUESTION_1 LAST FOUR WEEKS HOW MUCH OF THE TIME DID YOUR ASTHMA KEEP YOU FROM GETTING AS MUCH DONE AT WORK, SCHOOL OR AT HOME: NONE OF THE TIME
QUESTION_4 LAST FOUR WEEKS HOW OFTEN HAVE YOU USED YOUR RESCUE INHALER OR NEBULIZER MEDICATION (SUCH AS ALBUTEROL): NOT AT ALL
QUESTION_5 LAST FOUR WEEKS HOW WOULD YOU RATE YOUR ASTHMA CONTROL: COMPLETELY CONTROLLED
ACT_TOTALSCORE: 23
QUESTION_2 LAST FOUR WEEKS HOW OFTEN HAVE YOU HAD SHORTNESS OF BREATH: NOT AT ALL
QUESTION_3 LAST FOUR WEEKS HOW OFTEN DID YOUR ASTHMA SYMPTOMS (WHEEZING, COUGHING, SHORTNESS OF BREATH, CHEST TIGHTNESS OR PAIN) WAKE YOU UP AT NIGHT OR EARLIER THAN USUAL IN THE MORNING: ONCE A WEEK

## 2025-04-13 ASSESSMENT — SOCIAL DETERMINANTS OF HEALTH (SDOH): HOW OFTEN DO YOU GET TOGETHER WITH FRIENDS OR RELATIVES?: THREE TIMES A WEEK

## 2025-04-15 ENCOUNTER — OFFICE VISIT (OUTPATIENT)
Dept: FAMILY MEDICINE | Facility: CLINIC | Age: 80
End: 2025-04-15
Attending: FAMILY MEDICINE
Payer: MEDICARE

## 2025-04-15 VITALS
BODY MASS INDEX: 29.73 KG/M2 | HEIGHT: 66 IN | DIASTOLIC BLOOD PRESSURE: 70 MMHG | HEART RATE: 81 BPM | RESPIRATION RATE: 18 BRPM | WEIGHT: 185 LBS | OXYGEN SATURATION: 99 % | TEMPERATURE: 98.2 F | SYSTOLIC BLOOD PRESSURE: 136 MMHG

## 2025-04-15 DIAGNOSIS — F41.9 ANXIETY: ICD-10-CM

## 2025-04-15 DIAGNOSIS — Z00.00 ENCOUNTER FOR MEDICARE ANNUAL WELLNESS EXAM: Primary | ICD-10-CM

## 2025-04-15 DIAGNOSIS — B07.9 VIRAL WARTS, UNSPECIFIED TYPE: ICD-10-CM

## 2025-04-15 PROCEDURE — G0439 PPPS, SUBSEQ VISIT: HCPCS | Performed by: FAMILY MEDICINE

## 2025-04-15 PROCEDURE — 3078F DIAST BP <80 MM HG: CPT | Performed by: FAMILY MEDICINE

## 2025-04-15 PROCEDURE — 99214 OFFICE O/P EST MOD 30 MIN: CPT | Mod: 25 | Performed by: FAMILY MEDICINE

## 2025-04-15 PROCEDURE — 17110 DESTRUCTION B9 LES UP TO 14: CPT | Performed by: FAMILY MEDICINE

## 2025-04-15 PROCEDURE — 1126F AMNT PAIN NOTED NONE PRSNT: CPT | Performed by: FAMILY MEDICINE

## 2025-04-15 PROCEDURE — 3075F SYST BP GE 130 - 139MM HG: CPT | Performed by: FAMILY MEDICINE

## 2025-04-15 RX ORDER — LORAZEPAM 0.5 MG/1
0.5 TABLET ORAL EVERY 8 HOURS PRN
Qty: 20 TABLET | Refills: 1 | Status: SHIPPED | OUTPATIENT
Start: 2025-04-15

## 2025-04-15 ASSESSMENT — PAIN SCALES - GENERAL: PAINLEVEL_OUTOF10: NO PAIN (0)

## 2025-04-15 NOTE — PATIENT INSTRUCTIONS
Patient Education   Preventive Care Advice   This is general advice given by our system to help you stay healthy. However, your care team may have specific advice just for you. Please talk to your care team about your preventive care needs.  Nutrition  Eat 5 or more servings of fruits and vegetables each day.  Try wheat bread, brown rice and whole grain pasta (instead of white bread, rice, and pasta).  Get enough calcium and vitamin D. Check the label on foods and aim for 100% of the RDA (recommended daily allowance).  Lifestyle  Exercise at least 150 minutes each week  (30 minutes a day, 5 days a week).  Do muscle strengthening activities 2 days a week. These help control your weight and prevent disease.  No smoking.  Wear sunscreen to prevent skin cancer.  Have a dental exam and cleaning every 6 months.  Yearly exams  See your health care team every year to talk about:  Any changes in your health.  Any medicines your care team has prescribed.  Preventive care, family planning, and ways to prevent chronic diseases.  Shots (vaccines)   HPV shots (up to age 26), if you've never had them before.  Hepatitis B shots (up to age 59), if you've never had them before.  COVID-19 shot: Get this shot when it's due.  Flu shot: Get a flu shot every year.  Tetanus shot: Get a tetanus shot every 10 years.  Pneumococcal, hepatitis A, and RSV shots: Ask your care team if you need these based on your risk.  Shingles shot (for age 50 and up)  General health tests  Diabetes screening:  Starting at age 35, Get screened for diabetes at least every 3 years.  If you are younger than age 35, ask your care team if you should be screened for diabetes.  Cholesterol test: At age 39, start having a cholesterol test every 5 years, or more often if advised.  Bone density scan (DEXA): At age 50, ask your care team if you should have this scan for osteoporosis (brittle bones).  Hepatitis C: Get tested at least once in your life.  STIs (sexually  transmitted infections)  Before age 24: Ask your care team if you should be screened for STIs.  After age 24: Get screened for STIs if you're at risk. You are at risk for STIs (including HIV) if:  You are sexually active with more than one person.  You don't use condoms every time.  You or a partner was diagnosed with a sexually transmitted infection.  If you are at risk for HIV, ask about PrEP medicine to prevent HIV.  Get tested for HIV at least once in your life, whether you are at risk for HIV or not.  Cancer screening tests  Cervical cancer screening: If you have a cervix, begin getting regular cervical cancer screening tests starting at age 21.  Breast cancer scan (mammogram): If you've ever had breasts, begin having regular mammograms starting at age 40. This is a scan to check for breast cancer.  Colon cancer screening: It is important to start screening for colon cancer at age 45.  Have a colonoscopy test every 10 years (or more often if you're at risk) Or, ask your provider about stool tests like a FIT test every year or Cologuard test every 3 years.  To learn more about your testing options, visit:   .  For help making a decision, visit:   https://bit.ly/zo67668.  Prostate cancer screening test: If you have a prostate, ask your care team if a prostate cancer screening test (PSA) at age 55 is right for you.  Lung cancer screening: If you are a current or former smoker ages 50 to 80, ask your care team if ongoing lung cancer screenings are right for you.  For informational purposes only. Not to replace the advice of your health care provider. Copyright   2023 [facility]. All rights reserved. Clinically reviewed by the [facility]. [employer] 273503 - REV 01/24.  Learning About Stress  What is stress?     Stress is your body's response to a hard situation. Your body can have a physical, emotional, or mental response. Stress is a fact of life for most people, and it  affects everyone differently. What causes stress for you may not be stressful for someone else.  A lot of things can cause stress. You may feel stress when you go on a job interview, take a test, or run a race. This kind of short-term stress is normal and even useful. It can help you if you need to work hard or react quickly. For example, stress can help you finish an important job on time.  Long-term stress is caused by ongoing stressful situations or events. Examples of long-term stress include long-term health problems, ongoing problems at work, or conflicts in your family. Long-term stress can harm your health.  How does stress affect your health?  When you are stressed, your body responds as though you are in danger. It makes hormones that speed up your heart, make you breathe faster, and give you a burst of energy. This is called the fight-or-flight stress response. If the stress is over quickly, your body goes back to normal and no harm is done.  But if stress happens too often or lasts too long, it can have bad effects. Long-term stress can make you more likely to get sick, and it can make symptoms of some diseases worse. If you tense up when you are stressed, you may develop neck, shoulder, or low back pain. Stress is linked to high blood pressure and heart disease.  Stress also harms your emotional health. It can make you cedeno, tense, or depressed. Your relationships may suffer, and you may not do well at work or school.  What can you do to manage stress?  You can try these things to help manage stress:   Do something active. Exercise or activity can help reduce stress. Walking is a great way to get started. Even everyday activities such as housecleaning or yard work can help.  Try yoga or luis chi. These techniques combine exercise and meditation. You may need some training at first to learn them.  Do something you enjoy. For example, listen to music or go to a movie. Practice your hobby or do volunteer  "work.  Meditate. This can help you relax, because you are not worrying about what happened before or what may happen in the future.  Do guided imagery. Imagine yourself in any setting that helps you feel calm. You can use online videos, books, or a teacher to guide you.  Do breathing exercises. For example:  From a standing position, bend forward from the waist with your knees slightly bent. Let your arms dangle close to the floor.  Breathe in slowly and deeply as you return to a standing position. Roll up slowly and lift your head last.  Hold your breath for just a few seconds in the standing position.  Breathe out slowly and bend forward from the waist.  Let your feelings out. Talk, laugh, cry, and express anger when you need to. Talking with supportive friends or family, a counselor, or a ayden leader about your feelings is a healthy way to relieve stress. Avoid discussing your feelings with people who make you feel worse.  Write. It may help to write about things that are bothering you. This helps you find out how much stress you feel and what is causing it. When you know this, you can find better ways to cope.  What can you do to prevent stress?  You might try some of these things to help prevent stress:  Manage your time. This helps you find time to do the things you want and need to do.  Get enough sleep. Your body recovers from the stresses of the day while you are sleeping.  Get support. Your family, friends, and community can make a difference in how you experience stress.  Limit your news feed. Avoid or limit time on social media or news that may make you feel stressed.  Do something active. Exercise or activity can help reduce stress. Walking is a great way to get started.  Where can you learn more?  Go to https://www.[employer].net/patiented  Enter N032 in the search box to learn more about \"Learning About Stress.\"  Current as of: October 24, 2024  Content Version: 14.4 2024-2025 [employer].   Care instructions adapted under license by your healthcare professional. If you have questions about a medical condition or this instruction, always ask your healthcare professional. [employer] disclaims any warranty or liability for your use of this information.    Bladder Training: Care Instructions  Your Care Instructions     Bladder training is used to treat urge incontinence and stress incontinence. Urge incontinence means that the need to urinate comes on so fast that you can't get to a toilet in time. Stress incontinence means that you leak urine because of pressure on your bladder. For example, it may happen when you laugh, cough, or lift something heavy.  Bladder training can increase how long you can wait before you have to urinate. It can also help your bladder hold more urine. And it can give you better control over the urge to urinate.  It is important to remember that bladder training takes a few weeks to a few months to make a difference. You may not see results right away, but don't give up.  Follow-up care is a key part of your treatment and safety. Be sure to make and go to all appointments, and call your doctor if you are having problems. It's also a good idea to know your test results and keep a list of the medicines you take.  How can you care for yourself at home?  Work with your doctor to come up with a bladder training program that is right for you. You may use one or more of the following methods.  Delayed urination  In the beginning, try to keep from urinating for 5 minutes after you first feel the need to go.  While you wait, take deep, slow breaths to relax. Kegel exercises can also help you delay the need to go to the bathroom.  After some practice, when you can easily wait 5 minutes to urinate, try to wait 10 minutes before you urinate.  Slowly increase the waiting period until you are able to control when you have to urinate.  Scheduled urination  Empty your bladder  "when you first wake up in the morning.  Schedule times throughout the day when you will urinate.  Start by going to the bathroom every hour, even if you don't need to go.  Slowly increase the time between trips to the bathroom.  When you have found a schedule that works well for you, keep doing it.  If you wake up during the night and have to urinate, do it. Apply your schedule to waking hours only.  Kegel exercises  These tighten and strengthen pelvic muscles, which can help you control the flow of urine. (If doing these exercises causes pain, stop doing them and talk with your doctor.) To do Kegel exercises:  Squeeze your muscles as if you were trying not to pass gas. Or squeeze your muscles as if you were stopping the flow of urine. Your belly, legs, and buttocks shouldn't move.  Hold the squeeze for 3 seconds, then relax for 5 to 10 seconds.  Start with 3 seconds, then add 1 second each week until you are able to squeeze for 10 seconds.  Repeat the exercise 10 times a session. Do 3 to 8 sessions a day.  When should you call for help?  Watch closely for changes in your health, and be sure to contact your doctor if:    Your incontinence is getting worse.     You do not get better as expected.   Where can you learn more?  Go to https://www.[employer].net/patiented  Enter V684 in the search box to learn more about \"Bladder Training: Care Instructions.\"  Current as of: April 30, 2024  Content Version: 14.4    1812-1456 [employer].   Care instructions adapted under license by your healthcare professional. If you have questions about a medical condition or this instruction, always ask your healthcare professional. [employer] disclaims any warranty or liability for your use of this information.       "

## 2025-04-15 NOTE — PROGRESS NOTES
"Preventive Care Visit  [facility]  Xiao Dasilva MD, Family Medicine  Apr 15, 2025      Assessment & Plan     Encounter for Medicare annual wellness exam      Anxiety  Stable no change in treatment plan.   Uses the below prn 2-3 times per week   - LORazepam (ATIVAN) 0.5 MG tablet; Take 1 tablet (0.5 mg) by mouth every 8 hours as needed for anxiety.    Viral warts, unspecified type    - DESTRUCT BENIGN LESION, UP TO 14    Patient has been advised of split billing requirements and indicates understanding: Yes        BMI  Estimated body mass index is 30.32 kg/m  as calculated from the following:    Height as of this encounter: 1.664 m (5' 5.5\").    Weight as of this encounter: 83.9 kg (185 lb).   Weight management plan: Discussed healthy diet and exercise guidelines    Counseling  Appropriate preventive services were addressed with this patient via screening, questionnaire, or discussion as appropriate for fall prevention, nutrition, physical activity, Tobacco-use cessation, social engagement, weight loss and cognition.  Checklist reviewing preventive services available has been given to the patient.  Reviewed patient's diet, addressing concerns and/or questions.   She is at risk for psychosocial distress and has been provided with information to reduce risk.   Information on urinary incontinence and treatment options given to patient.           Dhaval Vidal is a 79 year old, presenting for the following:  Medicare Visit        4/15/2025     1:17 PM   Additional Questions   Roomed by Fara Armendariz CMA   Accompanied by self           HPI  Wart   Treated at [facility]   Not able to get back in for weeks   This is smaller after treatment     Anxiety   How are you doing with your anxiety since your last visit? No change  Are you having other symptoms that might be associated with anxiety? Yes:  panic  Have you had a significant life event? OTHER: business issues  health     Are " you feeling depressed? No  Do you have any concerns with your use of alcohol or other drugs? No    Social History     Tobacco Use    Smoking status: Never    Smokeless tobacco: Never   Vaping Use    Vaping status: Never Used   Substance Use Topics    Alcohol use: Yes     Comment: 2 daily (wine)    Drug use: No         5/4/2018    11:04 AM 3/19/2020     4:23 PM 2/4/2025     8:48 AM   JARETT-7 SCORE   Total Score  3 (minimal anxiety) 3 (minimal anxiety)   Total Score 0 3 3        Patient-reported         4/19/2017     1:51 PM 4/19/2017     1:57 PM 5/4/2018    11:02 AM   PHQ   PHQ-9 Total Score 5 6 2   Q9: Thoughts of better off dead/self-harm past 2 weeks Not at all Not at all Not at all        Proxy-reported            Advance Care Planning  Patient does not have a Health Care Directive: did not discuss       4/13/2025   General Health   How would you rate your overall physical health? Good   Feel stress (tense, anxious, or unable to sleep) Rather much   (!) STRESS CONCERN      4/13/2025   Nutrition   Diet: Regular (no restrictions)         4/13/2025   Exercise   Days per week of moderate/strenous exercise 5 days   Average minutes spent exercising at this level 60 min         4/13/2025   Social Factors   Frequency of gathering with friends or relatives Three times a week   Worry food won't last until get money to buy more No   Food not last or not have enough money for food? No   Do you have housing? (Housing is defined as stable permanent housing and does not include staying ouside in a car, in a tent, in an abandoned building, in an overnight shelter, or couch-surfing.) Yes   Are you worried about losing your housing? No   Lack of transportation? No   Unable to get utilities (heat,electricity)? Yes   Want help with housing or utility concern? No   (!) FINANCIAL RESOURCE STRAIN CONCERN      4/13/2025   Fall Risk   Fallen 2 or more times in the past year? No    No   Trouble with walking or balance? No    No        Multiple values from one day are sorted in reverse-chronological order          4/13/2025   Activities of Daily Living- Home Safety   Needs help with the following daily activites None of the above   Safety concerns in the home None of the above         4/13/2025   Dental   Dentist two times every year? Yes         4/13/2025   Hearing Screening   Hearing concerns? None of the above         4/13/2025   Driving Risk Screening   Patient/family members have concerns about driving No         4/13/2025   General Alertness/Fatigue Screening   Have you been more tired than usual lately? No         4/13/2025   Urinary Incontinence Screening   Bothered by leaking urine in past 6 months Yes           4/3/2024   TB Screening   Were you born outside of the [place]? No           Today's PHQ-2 Score:       4/14/2025    12:02 AM   PHQ-2 ( 1999 Pfizer)   Q1: Little interest or pleasure in doing things 0   Q2: Feeling down, depressed or hopeless 1   PHQ-2 Score 1    Q1: Little interest or pleasure in doing things Not at all   Q2: Feeling down, depressed or hopeless Several days   PHQ-2 Score 1       Patient-reported           4/13/2025   Substance Use   Alcohol more than 3/day or more than 7/wk No   Do you have a current opioid prescription? No   How severe/bad is pain from 1 to 10? 0/10 (No Pain)   Do you use any other substances recreationally? No     Social History     Tobacco Use    Smoking status: Never    Smokeless tobacco: Never   Vaping Use    Vaping status: Never Used   Substance Use Topics    Alcohol use: Yes     Comment: 2 daily (wine)    Drug use: No           4/15/2025   LAST FHS-7 RESULTS   1st degree relative breast or ovarian cancer Yes   Any relative bilateral breast cancer No   Any male have breast cancer No   Any ONE woman have BOTH breast AND ovarian cancer No   Any woman with breast cancer before 50yrs Yes   2 or more relatives with breast AND/OR ovarian cancer No   2 or more relatives with breast AND/OR bowel cancer  No        Mammogram Screening - Mammogram every 1-2 years updated in Health Maintenance based on mutual decision making    ASCVD Risk   The 10-year ASCVD risk score (Gissell REICH, et al., 2019) is: 28.1%    Values used to calculate the score:      Age: 79 years      Sex: Female      Is Non- : No      Diabetic: No      Tobacco smoker: No      Systolic Blood Pressure: 136 mmHg      Is BP treated: No      HDL Cholesterol: 76 mg/dL      Total Cholesterol: 188 mg/dL            Reviewed and updated as needed this visit by Provider   Tobacco  Allergies  Meds  Problems  Med Hx  Surg Hx  Fam Hx              Current providers sharing in care for this patient include:  Patient Care Team:  Xiao Dasilva MD as PCP - General (Family Practice)  Xiao Dasilva MD as Assigned PCP  Evelyne Hooper PA-C as Physician Assistant (Dermatology)  Evelyne Milton AuD as Audiologist (Audiology)  Parish Velázquez MD as MD (Otolaryngology)  Corbin Alvarez MD as MD (Dermatology)  Albino Gurrola DPM as Assigned Surgical Provider  Corbin Alvarez MD as Assigned Dermatology Provider    The following health maintenance items are reviewed in Epic and correct as of today:  Health Maintenance   Topic Date Due    ASTHMA ACTION PLAN  02/24/2021    COVID-19 Vaccine (9 - 2024-25 season) 03/24/2025    ASTHMA CONTROL TEST  10/15/2025    ANNUAL REVIEW OF HM ORDERS  02/04/2026    MEDICARE ANNUAL WELLNESS VISIT  04/15/2026    FALL RISK ASSESSMENT  04/15/2026    DIABETES SCREENING  04/11/2027    DTAP/TDAP/TD IMMUNIZATION (4 - Td or Tdap) 11/07/2027    COLORECTAL CANCER SCREENING  01/10/2029    ADVANCE CARE PLANNING  04/03/2029    LIPID  04/11/2029    DEXA  04/04/2032    HEPATITIS C SCREENING  Completed    PHQ-2 (once per calendar year)  Completed    INFLUENZA VACCINE  Completed    Pneumococcal Vaccine: 50+ Years  Completed    ZOSTER IMMUNIZATION  Completed    RSV VACCINE  Completed    HPV  "IMMUNIZATION  Aged Out    MENINGITIS IMMUNIZATION  Aged Out    MAMMO SCREENING  Discontinued         Review of Systems  Constitutional, HEENT, cardiovascular, pulmonary, gi and gu systems are negative, except as otherwise noted.     Objective    Exam  /70   Pulse 81   Temp 98.2  F (36.8  C) (Tympanic)   Resp 18   Ht 1.664 m (5' 5.5\")   Wt 83.9 kg (185 lb)   SpO2 99%   BMI 30.32 kg/m     Estimated body mass index is 30.32 kg/m  as calculated from the following:    Height as of this encounter: 1.664 m (5' 5.5\").    Weight as of this encounter: 83.9 kg (185 lb).    Physical Exam  GENERAL: alert and no distress  EYES: Eyes grossly normal to inspection, PERRL and conjunctivae and sclerae normal  HENT: ear canals and TM's normal, nose and mouth without ulcers or lesions  NECK: no adenopathy, no asymmetry, masses, or scars  RESP: lungs clear to auscultation - no rales, rhonchi or wheezes  CV: regular rate and rhythm, normal S1 S2, no S3 or S4, no murmur, click or rub, no peripheral edema  ABDOMEN: soft, nontender, no hepatosplenomegaly, no masses and bowel sounds normal  MS: no gross musculoskeletal defects noted, no edema  SKIN: wart right index finger   1 cm   Pared using a 15 blade and cryotherapy in a freeze thaw cycle time two. patient tolerated the procedure. Basic wound care instructions given.    NEURO: Normal strength and tone, mentation intact and speech normal  PSYCH: mentation appears normal, affect normal/bright        4/15/2025   Mini Cog   Mini-Cog Not Completed (choose reason) Patient declines              Signed Electronically by: Xiao Dasilva MD    "

## 2025-06-16 ENCOUNTER — OFFICE VISIT (OUTPATIENT)
Dept: DERMATOLOGY | Facility: CLINIC | Age: 80
End: 2025-06-16
Payer: MEDICARE

## 2025-06-16 DIAGNOSIS — B07.8 OTHER VIRAL WARTS: ICD-10-CM

## 2025-06-16 DIAGNOSIS — R63.30 FEEDING DIFFICULTIES, UNSPECIFIED: ICD-10-CM

## 2025-06-16 DIAGNOSIS — E55.9 VITAMIN D DEFICIENCY: ICD-10-CM

## 2025-06-16 DIAGNOSIS — L82.0 INFLAMED SEBORRHEIC KERATOSIS: ICD-10-CM

## 2025-06-16 DIAGNOSIS — K12.0 APHTHOUS ULCER: Primary | ICD-10-CM

## 2025-06-16 LAB
BASOPHILS # BLD AUTO: 0 10E3/UL (ref 0–0.2)
BASOPHILS NFR BLD AUTO: 0 %
EOSINOPHIL # BLD AUTO: 0.2 10E3/UL (ref 0–0.7)
EOSINOPHIL NFR BLD AUTO: 3 %
ERYTHROCYTE [DISTWIDTH] IN BLOOD BY AUTOMATED COUNT: 12.6 % (ref 10–15)
FOLATE SERPL-MCNC: 25.7 NG/ML (ref 4.6–34.8)
HCT VFR BLD AUTO: 35.5 % (ref 35–47)
HGB BLD-MCNC: 12.3 G/DL (ref 11.7–15.7)
IMM GRANULOCYTES # BLD: 0 10E3/UL
IMM GRANULOCYTES NFR BLD: 0 %
IRON BINDING CAPACITY (ROCHE): 307 UG/DL (ref 240–430)
IRON SATN MFR SERPL: 30 % (ref 15–46)
IRON SERPL-MCNC: 92 UG/DL (ref 37–145)
LYMPHOCYTES # BLD AUTO: 1.5 10E3/UL (ref 0.8–5.3)
LYMPHOCYTES NFR BLD AUTO: 31 %
MCH RBC QN AUTO: 31.5 PG (ref 26.5–33)
MCHC RBC AUTO-ENTMCNC: 34.6 G/DL (ref 31.5–36.5)
MCV RBC AUTO: 91 FL (ref 78–100)
MONOCYTES # BLD AUTO: 0.4 10E3/UL (ref 0–1.3)
MONOCYTES NFR BLD AUTO: 8 %
NEUTROPHILS # BLD AUTO: 2.7 10E3/UL (ref 1.6–8.3)
NEUTROPHILS NFR BLD AUTO: 57 %
PLATELET # BLD AUTO: 195 10E3/UL (ref 150–450)
RBC # BLD AUTO: 3.9 10E6/UL (ref 3.8–5.2)
VIT B12 SERPL-MCNC: 816 PG/ML (ref 232–1245)
VIT D+METAB SERPL-MCNC: 47 NG/ML (ref 20–50)
WBC # BLD AUTO: 4.8 10E3/UL (ref 4–11)

## 2025-06-16 PROCEDURE — 82306 VITAMIN D 25 HYDROXY: CPT | Performed by: PHYSICIAN ASSISTANT

## 2025-06-16 PROCEDURE — 83540 ASSAY OF IRON: CPT | Mod: GZ | Performed by: PHYSICIAN ASSISTANT

## 2025-06-16 PROCEDURE — 85025 COMPLETE CBC W/AUTO DIFF WBC: CPT | Performed by: PHYSICIAN ASSISTANT

## 2025-06-16 PROCEDURE — 17110 DESTRUCTION B9 LES UP TO 14: CPT | Performed by: PHYSICIAN ASSISTANT

## 2025-06-16 PROCEDURE — 36415 COLL VENOUS BLD VENIPUNCTURE: CPT | Performed by: PHYSICIAN ASSISTANT

## 2025-06-16 PROCEDURE — 84630 ASSAY OF ZINC: CPT | Mod: 90 | Performed by: PHYSICIAN ASSISTANT

## 2025-06-16 PROCEDURE — 99214 OFFICE O/P EST MOD 30 MIN: CPT | Mod: 25 | Performed by: PHYSICIAN ASSISTANT

## 2025-06-16 PROCEDURE — 83550 IRON BINDING TEST: CPT | Mod: GZ | Performed by: PHYSICIAN ASSISTANT

## 2025-06-16 PROCEDURE — 82746 ASSAY OF FOLIC ACID SERUM: CPT | Performed by: PHYSICIAN ASSISTANT

## 2025-06-16 PROCEDURE — 11900 INJECT SKIN LESIONS </W 7: CPT | Mod: 59 | Performed by: PHYSICIAN ASSISTANT

## 2025-06-16 PROCEDURE — 82607 VITAMIN B-12: CPT | Performed by: PHYSICIAN ASSISTANT

## 2025-06-16 RX ORDER — CLOBETASOL PROPIONATE 0.05 MG/G
GEL TOPICAL 2 TIMES DAILY
Qty: 30 G | Refills: 1 | Status: SHIPPED | OUTPATIENT
Start: 2025-06-16

## 2025-06-16 RX ORDER — CANDIDA ALBICANS 1000 [PNU]/ML
0.3 INJECTION, SOLUTION INTRADERMAL ONCE
Status: COMPLETED | OUTPATIENT
Start: 2025-06-16 | End: 2025-06-16

## 2025-06-16 RX ADMIN — CANDIDA ALBICANS 0.3 ML: 1000 INJECTION, SOLUTION INTRADERMAL at 13:44

## 2025-06-16 NOTE — PROGRESS NOTES
[facility] Dermatology Note  Encounter Date: Jun 16, 2025  Office Visit     Reviewed patients past medical history and pertinent chart review prior to patients visit today.     Dermatology Problem List:  1. Wart, right 2nd finger   - Cryotherapy and Candida injection 2/10/2025, 6/16/2025   - Previous: Cryotherapy several times by PCP, over-the-counter salicylic acid  2.  Actinic keratoses, status post cryotherapy  3.  Aphthous ulcers  -Labs ordered 6/16/2025  - Clobetasol 0.05% gel    ____________________________________________      CC: RECHECK (wart)    HPI:  Ms. Ruba Farmer is a(n) 79 year old female who presents today as a return patient for a wart involving the right second finger.  This was previously treated several times with cryotherapy by her primary care provider.  The patient was last seen on 2/10/2025, at which time the lesion was treated with both cryotherapy and Candida injection.  The patient reports tolerating the Candida well.  The lesion is slightly smaller.  The patient would also appreciate close examination of canker sores involving her mouth that come and go, as well as an irritated spot on the back.      Patient is otherwise feeling well, without additional skin concerns.    Medications:  Current Outpatient Medications   Medication Sig Dispense Refill    clobetasol (TEMOVATE) 0.05 % GEL topical gel Apply topically 2 times daily. Apply to ulcers in mouth for up to 2-3 weeks at a time. 30 g 1    albuterol (PROAIR HFA/PROVENTIL HFA/VENTOLIN HFA) 108 (90 Base) MCG/ACT inhaler Inhale 2 puffs into the lungs every 6 hours as needed for shortness of breath / dyspnea 1 Inhaler 1    Ascorbic Acid (VITAMIN C) 500 MG CAPS Take 500 mg by mouth daily       azithromycin (ZITHROMAX) 250 MG tablet TAKE 2 TABLETS BY MOUTH ON DAY 1, THEN 1 TABLET DAILY ON DAYS 2 THROUGH 5. 6 tablet 2    busPIRone (BUSPAR) 5 MG tablet Take 1 tablet (5 mg) by mouth at bedtime. 90 tablet 3    Cholecalciferol  (VITAMIN D) 2000 UNITS CAPS Take 2,000 Units by mouth daily       Fexofenadine HCl (ALLEGRA ALLERGY PO) Take by mouth daily.      fluocinolone acetonide oil 0.01 % ear drops Place 0.25 mLs (5 drops) into both ears 2 times daily. 3.5 mL 11    ibuprofen (ADVIL/MOTRIN) 200 MG tablet 1-3 tablets as needed with food 120 tablet 0    latanoprost (XALATAN) 0.005 % ophthalmic solution 1 drop.      LORazepam (ATIVAN) 0.5 MG tablet Take 1 tablet (0.5 mg) by mouth every 8 hours as needed for anxiety. 20 tablet 1    Magnesium Oxide -Mg Supplement 500 MG TABS       melatonin 3 MG tablet Take 3 mg by mouth nightly as needed for sleep      ondansetron (ZOFRAN) 4 MG tablet Take 1 tablet (4 mg) by mouth every 8 hours as needed for nausea. 10 tablet 0    phentermine 30 MG capsule Take 1 capsule (30 mg) by mouth every morning. 30 capsule 5    POTASSIUM CITRATE PO Take 1 tablet by mouth daily      pseudoePHEDrine (SUDAFED) 30 MG tablet Take 30 mg by mouth every 4 hours as needed for congestion.      terbinafine (LAMISIL) 1 % external cream Apply topically 2 times daily. 42 g 5    topiramate (TOPAMAX) 25 MG tablet Take 1 tablet (25 mg) by mouth 2 times daily. 180 tablet 3     No current facility-administered medications for this visit.      Past Medical History:   Patient Active Problem List   Diagnosis    Anxiety    CARDIOVASCULAR SCREENING; LDL GOAL LESS THAN 160    Polyp of colon, adenomatous    Senile nuclear sclerosis    Recurrent UTI    Nonallergic rhinitis    Diagnostic skin and sensitization tests (aka ALLERGENS)    Mild persistent asthma without complication    Strain of lumbar region    Chronic congestion of paranasal sinus    Chronic pain of right knee    Chronic upper back pain    Urge incontinence of urine    Overweight    Chronic left shoulder pain     Past Medical History:   Diagnosis Date    Bronchitis     pt reports yearly bronchitis    Diagnostic skin and sensitization tests (aka ALLERGENS) 7/22/15 IgE tests all  NEGATIVE for environmental allergens.     Nonallergic rhinitis     7/22/15 IgE tests all NEGATIVE for environmental allergens.     Pneumonia     hx of several episodes of pneumonia    Recurrent UTI     Uncomplicated asthma        ____________________________________________    Physical Exam:  Vitals: There were no vitals taken for this visit.  SKIN: Oral mucosa, back, and right second finger examined.  - Right second finger, flesh-colored verrucous papule with punctate hemorrhages   - Mid back, tan, waxy, stuck on appearing irritated papule consistent with an inflamed seborrheic keratosis  - Right lower inner lip, yellow to white ulcer    - No other lesions of concern on areas examined.       ____________________________________________    Assessment & Plan:   # Verruca vulgaris, right second finger  The patient was in agreement with both cryotherapy and Candida antigen injection today.    - Cryotherapy: Patient elects to treat warts today with cryotherapy. After verbal consent and discussion of risks and benefits including but no limited to dyspigmentation/scar, blister, and pain. After being cleansed with an alcohol swab, the wart(s) were pared down with a 15 blade and then a total of 1 warts were treated with 1-2mm freeze border for 3 cycle with liquid nitrogen. Post cryotherapy instructions were provided.     -Candida injection: patient has failed successive treatment with cryotherapy so I have recommended a series of intralesional immunotherapy with candida antigen.  This is a series of 3-4 injections and is about 70% effective.  Most patients don't see any improvement until after at least 2 injections.  After verbal consent was obtained, the skin was cleaned with an alcohol wipe and 0.3 ml of candida was injected under lesions on the right second finger the patient tolerated the procedure well and left the clinic in good condition.  If the patient would experience shortness of breath, tightening of throat, or  swelling of mouth or throat, the patient should seek emergent care.    Follow-up: 1 month(s) in-person, or earlier for new or changing lesions    # Inflamed seborrheic keratosis, mid back  This is a benign lesion.  Cryotherapy was offered today due to the inflammation present, but the patient kindly declined.    # History of aphthous ulcers  The patient has 1 ulcer involving the right lower inner lip upon examination today.  However, she notes a history of these that tend to come and go.  The lesions make it difficult to eat certain foods.  To check for any nutritional deficiencies/causes, I have ordered folate, vitamin D, iron, vitamin B12, and zinc levels, as well as a CBC.  I have also prescribed clobetasol 0.05% gel for the patient to apply to the ulcers twice daily for up to 2 to 3 weeks to help reduce inflammation.  However if an ulcer would persist, I would recommend a referral to ENT for a biopsy.    All risks, benefits and alternatives were discussed with patient.  Patient is in agreement and understands the assessment and plan.  All questions were answered.    Analisa Martin PA-C  [facility] Dermatology    CC Referred MD Reid  No address on file on close of this encounter.

## 2025-06-16 NOTE — LETTER
6/16/2025      Ruba Farmer  [address] [facility] [id number]      Dear Colleague,    Thank you for referring your patient, Ruba Farmer, to the [facility]. Please see a copy of my visit note below.    Clinic Administered Medication Documentation        Patient was given Candin. Prior to medication administration, verified patient's identity using patient s name and date of birth. Please see MAR and medication order for additional information. Patient instructed to remain in clinic for 15 minutes and report any adverse reaction to staff immediately.    Vial/Syringe: Multi dose vial      [facility] Dermatology Note  Encounter Date: Jun 16, 2025  Office Visit     Reviewed patients past medical history and pertinent chart review prior to patients visit today.     Dermatology Problem List:  1. Wart, right 2nd finger   - Cryotherapy and Candida injection 2/10/2025, 6/16/2025   - Previous: Cryotherapy several times by PCP, over-the-counter salicylic acid  2.  Actinic keratoses, status post cryotherapy  3.  Aphthous ulcers  -Labs ordered 6/16/2025  - Clobetasol 0.05% gel    ____________________________________________      CC: RECHECK (wart)    HPI:  Ms. Ruba Farmer is a(n) 79 year old female who presents today as a return patient for a wart involving the right second finger.  This was previously treated several times with cryotherapy by her primary care provider.  The patient was last seen on 2/10/2025, at which time the lesion was treated with both cryotherapy and Candida injection.  The patient reports tolerating the Candida well.  The lesion is slightly smaller.  The patient would also appreciate close examination of canker sores involving her mouth that come and go, as well as an irritated spot on the back.      Patient is otherwise feeling well, without additional skin concerns.    Medications:  Current Outpatient Medications   Medication Sig Dispense  Refill     clobetasol (TEMOVATE) 0.05 % GEL topical gel Apply topically 2 times daily. Apply to ulcers in mouth for up to 2-3 weeks at a time. 30 g 1     albuterol (PROAIR HFA/PROVENTIL HFA/VENTOLIN HFA) 108 (90 Base) MCG/ACT inhaler Inhale 2 puffs into the lungs every 6 hours as needed for shortness of breath / dyspnea 1 Inhaler 1     Ascorbic Acid (VITAMIN C) 500 MG CAPS Take 500 mg by mouth daily        azithromycin (ZITHROMAX) 250 MG tablet TAKE 2 TABLETS BY MOUTH ON DAY 1, THEN 1 TABLET DAILY ON DAYS 2 THROUGH 5. 6 tablet 2     busPIRone (BUSPAR) 5 MG tablet Take 1 tablet (5 mg) by mouth at bedtime. 90 tablet 3     Cholecalciferol (VITAMIN D) 2000 UNITS CAPS Take 2,000 Units by mouth daily        Fexofenadine HCl (ALLEGRA ALLERGY PO) Take by mouth daily.       fluocinolone acetonide oil 0.01 % ear drops Place 0.25 mLs (5 drops) into both ears 2 times daily. 3.5 mL 11     ibuprofen (ADVIL/MOTRIN) 200 MG tablet 1-3 tablets as needed with food 120 tablet 0     latanoprost (XALATAN) 0.005 % ophthalmic solution 1 drop.       LORazepam (ATIVAN) 0.5 MG tablet Take 1 tablet (0.5 mg) by mouth every 8 hours as needed for anxiety. 20 tablet 1     Magnesium Oxide -Mg Supplement 500 MG TABS        melatonin 3 MG tablet Take 3 mg by mouth nightly as needed for sleep       ondansetron (ZOFRAN) 4 MG tablet Take 1 tablet (4 mg) by mouth every 8 hours as needed for nausea. 10 tablet 0     phentermine 30 MG capsule Take 1 capsule (30 mg) by mouth every morning. 30 capsule 5     POTASSIUM CITRATE PO Take 1 tablet by mouth daily       pseudoePHEDrine (SUDAFED) 30 MG tablet Take 30 mg by mouth every 4 hours as needed for congestion.       terbinafine (LAMISIL) 1 % external cream Apply topically 2 times daily. 42 g 5     topiramate (TOPAMAX) 25 MG tablet Take 1 tablet (25 mg) by mouth 2 times daily. 180 tablet 3     No current facility-administered medications for this visit.      Past Medical History:   Patient Active Problem List    Diagnosis     Anxiety     CARDIOVASCULAR SCREENING; LDL GOAL LESS THAN 160     Polyp of colon, adenomatous     Senile nuclear sclerosis     Recurrent UTI     Nonallergic rhinitis     Diagnostic skin and sensitization tests (aka ALLERGENS)     Mild persistent asthma without complication     Strain of lumbar region     Chronic congestion of paranasal sinus     Chronic pain of right knee     Chronic upper back pain     Urge incontinence of urine     Overweight     Chronic left shoulder pain     Past Medical History:   Diagnosis Date     Bronchitis     pt reports yearly bronchitis     Diagnostic skin and sensitization tests (aka ALLERGENS) 7/22/15 IgE tests all NEGATIVE for environmental allergens.      Nonallergic rhinitis     7/22/15 IgE tests all NEGATIVE for environmental allergens.      Pneumonia     hx of several episodes of pneumonia     Recurrent UTI      Uncomplicated asthma        ____________________________________________    Physical Exam:  Vitals: There were no vitals taken for this visit.  SKIN: Oral mucosa, back, and right second finger examined.  - Right second finger, flesh-colored verrucous papule with punctate hemorrhages   - Mid back, tan, waxy, stuck on appearing irritated papule consistent with an inflamed seborrheic keratosis  - Right lower inner lip, yellow to white ulcer    - No other lesions of concern on areas examined.       ____________________________________________    Assessment & Plan:   # Verruca vulgaris, right second finger  The patient was in agreement with both cryotherapy and Candida antigen injection today.    - Cryotherapy: Patient elects to treat warts today with cryotherapy. After verbal consent and discussion of risks and benefits including but no limited to dyspigmentation/scar, blister, and pain. After being cleansed with an alcohol swab, the wart(s) were pared down with a 15 blade and then a total of 1 warts were treated with 1-2mm freeze border for 3 cycle with liquid  nitrogen. Post cryotherapy instructions were provided.     -Candida injection: patient has failed successive treatment with cryotherapy so I have recommended a series of intralesional immunotherapy with candida antigen.  This is a series of 3-4 injections and is about 70% effective.  Most patients don't see any improvement until after at least 2 injections.  After verbal consent was obtained, the skin was cleaned with an alcohol wipe and 0.3 ml of candida was injected under lesions on the right second finger the patient tolerated the procedure well and left the clinic in good condition.  If the patient would experience shortness of breath, tightening of throat, or swelling of mouth or throat, the patient should seek emergent care.    Follow-up: 1 month(s) in-person, or earlier for new or changing lesions    # Inflamed seborrheic keratosis, mid back  This is a benign lesion.  Cryotherapy was offered today due to the inflammation present, but the patient kindly declined.    # History of aphthous ulcers  The patient has 1 ulcer involving the right lower inner lip upon examination today.  However, she notes a history of these that tend to come and go.  The lesions make it difficult to eat certain foods.  To check for any nutritional deficiencies/causes, I have ordered folate, vitamin D, iron, vitamin B12, and zinc levels, as well as a CBC.  I have also prescribed clobetasol 0.05% gel for the patient to apply to the ulcers twice daily for up to 2 to 3 weeks to help reduce inflammation.  However if an ulcer would persist, I would recommend a referral to ENT for a biopsy.    All risks, benefits and alternatives were discussed with patient.  Patient is in agreement and understands the assessment and plan.  All questions were answered.    Analisa Martin PA-C  [facility] Dermatology    CC Referred MD Reid  No address on file on close of this encounter.     Again, thank you for allowing me to participate in the care  of your patient.        Sincerely,        Analisa Martin PA-C    Electronically signed

## 2025-06-16 NOTE — PROGRESS NOTES
Clinic Administered Medication Documentation        Patient was given Candin. Prior to medication administration, verified patient's identity using patient s name and date of birth. Please see MAR and medication order for additional information. Patient instructed to remain in clinic for 15 minutes and report any adverse reaction to staff immediately.    Vial/Syringe: Multi dose vial

## 2025-06-18 ENCOUNTER — NURSE TRIAGE (OUTPATIENT)
Dept: FAMILY MEDICINE | Facility: CLINIC | Age: 80
End: 2025-06-18
Payer: MEDICARE

## 2025-06-18 ENCOUNTER — RESULTS FOLLOW-UP (OUTPATIENT)
Dept: DERMATOLOGY | Facility: CLINIC | Age: 80
End: 2025-06-18

## 2025-06-18 DIAGNOSIS — R63.30 FEEDING DIFFICULTIES, UNSPECIFIED: ICD-10-CM

## 2025-06-18 DIAGNOSIS — S30.861A TICK BITE OF ABDOMINAL WALL, INITIAL ENCOUNTER: Primary | ICD-10-CM

## 2025-06-18 DIAGNOSIS — K12.0 APHTHOUS ULCER: Primary | ICD-10-CM
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
## 2025-06-18 LAB — ZINC SERPL-MCNC: 60.8 UG/DL

## 2025-06-18 RX ORDER — DOXYCYCLINE 100 MG/1
100 CAPSULE ORAL 2 TIMES DAILY
Qty: 42 CAPSULE | Refills: 0 | Status: SHIPPED | OUTPATIENT
Start: 2025-06-18 | End: 2025-06-19 | Stop reason: ALTCHOICE

## 2025-06-18 RX ORDER — DOXYCYCLINE 100 MG/1
CAPSULE ORAL
Qty: 2 CAPSULE | Refills: 0 | Status: SHIPPED | OUTPATIENT
Start: 2025-06-18 | End: 2025-06-18 | Stop reason: ALTCHOICE

## 2025-06-18 NOTE — TELEPHONE ENCOUNTER
Nurse Triage SBAR    Is this a 2nd Level Triage? YES, LICENSED PRACTITIONER REVIEW IS REQUIRED    Situation: Patient called the clinic with what she believes is a tick bite on her abdomen.     Background: Patient has had lyme's disease in the past. She stated she had several antibiotics for it due to failed treatments.     Assessment: Just now patient scratched her abdomen and scratched off what she thought at first was as black round scab but she then noticed what looked like legs there is some redness in the area. Swelling about the size a quarter. There is no bullseye rash. Patient has not had the typical EM rash in the past. No headaches, no joint pain. Denying all other systemic symptoms. She believes she caught it early. Does not know how long it was attached.     Due to patients experience with lyme's in the past she is asking for prophylactic treatment. Pharmacy pended  Protocol Recommended Disposition:   Home Care    Recommendation:  Routing to provider. Wait for a call back.    Routed to provider    Does the patient meet one of the following criteria for ADS visit consideration? No   Reason for Disposition   Tick bite with no complications    Additional Information   Negative: Red or very tender (to touch) area and started over 24 hours after the bite   Negative: Red ring or bull's-eye rash occurs around a deer tick bite   Negative: Probable deer tick that was attached > 36 hours (or tick appears swollen, not flat)   Negative: Patient wants to be seen    Protocols used: Tick Bite-A-[place]    Louise Horan RN on 6/18/2025 at 12:28 PM

## 2025-06-18 NOTE — TELEPHONE ENCOUNTER
"Patient notified but concerned about taking a one time dose. She says,\" this is a pretty big, hard, swollen spot\" and she is requesting treatment for a longer period of time. She says a one time dose has not worked int he past.   Please advise, Tere MORENO RN    "

## 2025-06-18 NOTE — TELEPHONE ENCOUNTER
Patient notified. Advised to follow up in person if she does not recover as expected or if symptoms worsen.   Tere MORENO RN

## 2025-06-19 RX ORDER — AMOXICILLIN 500 MG/1
500 CAPSULE ORAL 2 TIMES DAILY
Qty: 42 CAPSULE | Refills: 0 | Status: SHIPPED | OUTPATIENT
Start: 2025-06-19 | End: 2025-07-10

## 2025-06-19 NOTE — TELEPHONE ENCOUNTER
Odd   She has had this multiple times in the past   She can treat with amoxicillin   I will send that in for her if she wants to switch

## 2025-06-19 NOTE — TELEPHONE ENCOUNTER
Patient notified, previous Doxy discontinued. Patient is agreeable to trying the Amoxicillin.    She is told to call back if does not improve.      JEANINE Sandy

## 2025-06-19 NOTE — TELEPHONE ENCOUNTER
Forwarding to PCP and covering provider to advise on medication please.  Should patient try to continue with doxycycline,  or is there an alternative that can be prescribed?    Patient calls to follow-up to tick bite treatment.   She took the first dose of doxycycline yesterday, reports she did take it with food; however, she still had severe nausea which included sweating and salivation. The nausea was so bad that she had to lay down during a meeting she was supposed to have.  She ate more after, which did seem to help, but it took about 45 minutes for the nausea to start to subside.  Then throughout the night she she continued with more mild nausea and feeling a bit shaky.  She did not have any episodes of vomiting, nor abdominal pain.  She thinks she's adequately hydrated, is drinking and urinating. She states she has taken doxycycline in the past with more mild symptoms, but nothing like this.  She is afraid to take her next dose and is wondering if there is an alternative that can be prescribed?    Thanks,  Rachel Holt RN  [facility]

## 2025-07-17 ENCOUNTER — THERAPY VISIT (OUTPATIENT)
Dept: PHYSICAL THERAPY | Facility: CLINIC | Age: 80
End: 2025-07-17
Attending: FAMILY MEDICINE
Payer: MEDICARE

## 2025-07-17 DIAGNOSIS — S39.012D STRAIN OF LUMBAR REGION, SUBSEQUENT ENCOUNTER: ICD-10-CM

## 2025-07-17 PROCEDURE — 97110 THERAPEUTIC EXERCISES: CPT | Mod: GP | Performed by: PHYSICAL MEDICINE & REHABILITATION

## 2025-07-17 PROCEDURE — 97161 PT EVAL LOW COMPLEX 20 MIN: CPT | Mod: GP | Performed by: PHYSICAL MEDICINE & REHABILITATION

## 2025-07-17 PROCEDURE — 97140 MANUAL THERAPY 1/> REGIONS: CPT | Mod: GP | Performed by: PHYSICAL MEDICINE & REHABILITATION

## 2025-07-17 NOTE — PROGRESS NOTES
PHYSICAL THERAPY EVALUATION  Type of Visit: Evaluation       Fall Risk Screen:  Have you fallen 2 or more times in the past year?: No  Have you fallen and had an injury in the past year?: No    Subjective   Pt arrived to PT today for sciatic pain in L LE that started 6 weeks ago. Notes she has been doing some exercises at home from prior episodes of care but don't seem to be helping. Notes difficulty with straightening L LE as well as difficulty with sitting and driving in the car.   Presenting condition or subjective complaint: sciatica  Date of onset: 06/05/25 (started 6 weeks ago)    Relevant medical history: Bladder or bowel problems   Past Medical History:   Diagnosis Date    Bronchitis     pt reports yearly bronchitis    Diagnostic skin and sensitization tests (aka ALLERGENS) 7/22/15 IgE tests all NEGATIVE for environmental allergens.     Nonallergic rhinitis     7/22/15 IgE tests all NEGATIVE for environmental allergens.     Pneumonia     hx of several episodes of pneumonia    Recurrent UTI     Uncomplicated asthma      Dates & types of surgery: dont remember  Past Surgical History:   Procedure Laterality Date    ARTHROSCOPY SHOULDER DECOMPRESSION Right 3/2/2017    Procedure: ARTHROSCOPY SHOULDER DECOMPRESSION;  Surgeon: Dallas Rene MD;  Location: [facility]    BIOPSY  2013  ?    COLONOSCOPY  4/6/2007    COLONOSCOPY N/A 1/10/2024    Procedure: COLONOSCOPY, FLEXIBLE, WITH LESION REMOVAL USING SNARE;  Surgeon: Cj Naik DO;  Location: [facility]    COLONOSCOPY N/A 1/10/2024    Procedure: COLONOSCOPY, WITH POLYPECTOMY AND BIOPSY;  Surgeon: Cj Naik DO;  Location: [facility]    HYSTERECTOMY, VAGINAL  1998    PHACOEMULSIFICATION WITH STANDARD INTRAOCULAR LENS IMPLANT  6/17/2013    Procedure: PHACOEMULSIFICATION WITH STANDARD INTRAOCULAR LENS IMPLANT;  Left Kelman phacoemulsification with intraocular lens implant;  Surgeon: Alverto Tijerina MD;  Location: [facility]       Prior diagnostic  "imaging/testing results: Other previous therapy   Prior therapy history for the same diagnosis, illness or injury: Yes dont remember    Prior Level of Function  Transfers: Independent  Ambulation: Independent  ADL: Independent    Living Environment  Social support: With a significant other or spouse   Type of home: House; 2-story   Stairs to enter the home: Yes 20 Is there a railing: Yes     Ramp: No   Stairs inside the home: No       Help at home: Home and Yard maintenance tasks  Equipment owned:       Employment: Yes owner  Hobbies/Interests: walking    Patient goals for therapy: sit for long    Pain assessment: See objective evaluation for additional pain details     Objective   LUMBAR SPINE EVALUATION  PAIN: Pain Level at Rest: 0/10  Pain Level with Use: 8/10  Pain Location: lumbar spine and L glutes  Pain Quality: Tingling and pain  Pain Frequency: intermittent  Pain is Worst: daytime  Pain is Exacerbated By: sitting  Pain is Relieved By: use and walking  Pain Progression: Worsened  INTEGUMENTARY (edema, incisions): edema noted in B ankles  POSTURE: Sitting Posture: Rounded shoulders  GAIT:   Weightbearing Status: WBAT  Assistive Device(s): None  Gait Deviations: WNL  BALANCE/PROPRIOCEPTION: WNL  WEIGHTBEARING ALIGNMENT: WNL  NON-WEIGHTBEARING ALIGNMENT: WNL   AROM: flexion: 5\" from toes, ext: 50% limited (slight pain), SB B: WNL, Rotation: WNL  PELVIC/SI SCREEN: L posterior  STRENGTH: 5/5 global LE strength other than IR/ER: 4/5 each B (notes slight increase in pain with IR on L and knee ext)  CORD SIGNS: WNL  DERMATOMES: notes decreased sensation of L L2, L5 and S1 with dull touch when compared to R  NEURAL TENSION: sciatic tension  FLEXIBILITY: limited hamstrings and piriformis B, limited hip flexors B  LUMBAR/HIP Special Tests: neg slump and SLR   FUNCTIONAL TESTS: independent bed mobility and transfers  PALPATION: tenderness over L4 and L piriformis  SPINAL SEGMENTAL CONCLUSIONS: slight hypomobility of " lumbar spine    Assessment & Plan   CLINICAL IMPRESSIONS  Medical Diagnosis: Strain of lumbar region, subsequent encounter    Treatment Diagnosis: LBP with L LE radiculopathy   Impression/Assessment: Patient is a 80 year old female with acute LBP with L LE radiculopathy complaints.  The following significant findings have been identified: Pain, Decreased ROM/flexibility, Decreased joint mobility, Decreased strength, Impaired sensation, Inflammation, Edema, Impaired gait, Impaired muscle performance, Decreased activity tolerance, and Impaired posture. These impairments interfere with their ability to perform self care tasks, work tasks, recreational activities, and driving  as compared to previous level of function.     Clinical Decision Making (Complexity):  Clinical Presentation: Stable/Uncomplicated  Clinical Presentation Rationale: based on medical and personal factors listed in PT evaluation  Clinical Decision Making (Complexity): Low complexity    PLAN OF CARE  Treatment Interventions:  Modalities: Cryotherapy, Cupping, Dry Needling, E-stim, Hot Pack, Mechanical Traction, Ultrasound, TENS  Interventions: Gait Training, Manual Therapy, Neuromuscular Re-education, Therapeutic Activity, Therapeutic Exercise, Self-Care/Home Management    Long Term Goals     PT Goal 1  Goal Identifier: 1  Goal Description: Pt will be able to sit for 20+ minutes without increase in sx in order to decrease difficulty with working  Target Date: 08/14/25  PT Goal 2  Goal Identifier: 2  Goal Description: Pt will be able to sit for 40+ minutes without increase in sx in order to decrease difficulty with riding in car  Target Date: 08/28/25  PT Goal 3  Goal Identifier: 3  Goal Description: Pt will be independent with HEP in order to self manage symptoms  Target Date: 09/11/25      Frequency of Treatment: 1x/week  Duration of Treatment: 8 weeks    Recommended Referrals to Other Professionals: none  Education Assessment:   Learner/Method:  Patient;Listening;Reading;Demonstration;Pictures/Video;No Barriers to Learning  Education Comments: pt demonstrated and verbalized good understanding    Risks and benefits of evaluation/treatment have been explained.   Patient/Family/caregiver agrees with Plan of Care.     Evaluation Time:     PT Eval, Low Complexity Minutes (30240): 20   Present: Not applicable     Signing Clinician: Jeannie Billingsley PT      Please contact me with any questions or concerns.    Thank you for your referral,     Jeannie Billingsley PT, DPT  Physical Therapist  [place] [id number] [place] [id number]  [phone]    [facility]                                                                                   OUTPATIENT PHYSICAL THERAPY      PLAN OF TREATMENT FOR OUTPATIENT REHABILITATION   Patient's Last Name, First Name, CHANO FarmerRuba  G YOB: 1945   Provider's Name   [facility]   Medical Record No.  [id number]     Onset Date: 06/05/25 (started 6 weeks ago)  Start of Care Date: 07/17/25     Medical Diagnosis:  Strain of lumbar region, subsequent encounter      PT Treatment Diagnosis:  LBP with L LE radiculopathy Plan of Treatment  Frequency/Duration: 1x/week/ 8 weeks    Certification date from 07/17/25 to 09/11/25         See note for plan of treatment details and functional goals     Jeannie Billingsley PT                         I CERTIFY THE NEED FOR THESE SERVICES FURNISHED UNDER        THIS PLAN OF TREATMENT AND WHILE UNDER MY CARE     (Physician attestation of this document indicates review and certification of the therapy plan).              Referring Provider:  Xiao Dasilva    Initial Assessment  See Epic Evaluation- Start of Care Date: 07/17/25

## 2025-07-24 ENCOUNTER — THERAPY VISIT (OUTPATIENT)
Dept: PHYSICAL THERAPY | Facility: CLINIC | Age: 80
End: 2025-07-24
Attending: FAMILY MEDICINE
Payer: MEDICARE

## 2025-07-24 DIAGNOSIS — S39.012D LUMBAR SPINE STRAIN, SUBSEQUENT ENCOUNTER: Primary | ICD-10-CM

## 2025-07-24 DIAGNOSIS — S39.012D STRAIN OF LUMBAR REGION, SUBSEQUENT ENCOUNTER: ICD-10-CM

## 2025-07-24 PROCEDURE — 97110 THERAPEUTIC EXERCISES: CPT | Mod: GP | Performed by: PHYSICAL THERAPIST

## 2025-07-24 PROCEDURE — 97140 MANUAL THERAPY 1/> REGIONS: CPT | Mod: GP | Performed by: PHYSICAL THERAPIST

## 2025-08-05 ENCOUNTER — E-VISIT (OUTPATIENT)
Dept: FAMILY MEDICINE | Facility: CLINIC | Age: 80
End: 2025-08-05
Payer: MEDICARE

## 2025-08-05 DIAGNOSIS — B37.31 CANDIDAL VULVOVAGINITIS: Primary | ICD-10-CM

## 2025-08-06 RX ORDER — FLUCONAZOLE 150 MG/1
150 TABLET ORAL ONCE
Qty: 1 TABLET | Refills: 0 | Status: SHIPPED | OUTPATIENT
Start: 2025-08-06 | End: 2025-08-06

## 2025-08-07 ENCOUNTER — THERAPY VISIT (OUTPATIENT)
Dept: PHYSICAL THERAPY | Facility: CLINIC | Age: 80
End: 2025-08-07
Attending: FAMILY MEDICINE
Payer: MEDICARE

## 2025-08-07 DIAGNOSIS — S39.012D STRAIN OF LUMBAR REGION, SUBSEQUENT ENCOUNTER: Primary | ICD-10-CM

## 2025-08-07 PROCEDURE — 97110 THERAPEUTIC EXERCISES: CPT | Mod: GP | Performed by: PHYSICAL THERAPIST

## 2025-08-07 PROCEDURE — 97140 MANUAL THERAPY 1/> REGIONS: CPT | Mod: GP | Performed by: PHYSICAL THERAPIST

## 2025-08-18 ENCOUNTER — OFFICE VISIT (OUTPATIENT)
Dept: DERMATOLOGY | Facility: CLINIC | Age: 80
End: 2025-08-18
Payer: MEDICARE

## 2025-08-18 DIAGNOSIS — B07.8 OTHER VIRAL WARTS: Primary | ICD-10-CM

## 2025-08-18 PROCEDURE — 17110 DESTRUCTION B9 LES UP TO 14: CPT | Performed by: PHYSICIAN ASSISTANT

## 2025-08-18 RX ORDER — CANDIDA ALBICANS 1000 [PNU]/ML
0.3 INJECTION, SOLUTION INTRADERMAL ONCE
Status: COMPLETED | OUTPATIENT
Start: 2025-08-18 | End: 2025-08-18

## 2025-08-18 RX ADMIN — CANDIDA ALBICANS 0.3 ML: 1000 INJECTION, SOLUTION INTRADERMAL at 14:22

## 2025-08-21 ENCOUNTER — THERAPY VISIT (OUTPATIENT)
Dept: PHYSICAL THERAPY | Facility: CLINIC | Age: 80
End: 2025-08-21
Attending: FAMILY MEDICINE
Payer: MEDICARE

## 2025-08-21 DIAGNOSIS — S39.012D STRAIN OF LUMBAR REGION, SUBSEQUENT ENCOUNTER: Primary | ICD-10-CM

## 2025-08-21 PROCEDURE — 97110 THERAPEUTIC EXERCISES: CPT | Mod: GP | Performed by: PHYSICAL THERAPIST

## 2025-08-26 ENCOUNTER — THERAPY VISIT (OUTPATIENT)
Dept: PHYSICAL THERAPY | Facility: CLINIC | Age: 80
End: 2025-08-26
Attending: FAMILY MEDICINE
Payer: MEDICARE

## 2025-08-26 DIAGNOSIS — S39.012A STRAIN OF LUMBAR REGION: Primary | ICD-10-CM

## 2025-08-26 PROCEDURE — 97140 MANUAL THERAPY 1/> REGIONS: CPT | Mod: GP | Performed by: PHYSICAL MEDICINE & REHABILITATION

## 2025-08-26 PROCEDURE — 97110 THERAPEUTIC EXERCISES: CPT | Mod: GP | Performed by: PHYSICAL MEDICINE & REHABILITATION

## (undated) DEVICE — GOWN IMPERVIOUS SPECIALTY XLG/XLONG 32474

## (undated) DEVICE — SU ETHIBOND 1 OS-6 36" X538

## (undated) DEVICE — SLING ARM MED 79-99155

## (undated) DEVICE — SU ETHILON 4-0 PS-2 18" 1667G

## (undated) DEVICE — DEVICE ENDO CLIP INSTINCT PLUS INSC-P-7-230-S  G58010

## (undated) DEVICE — NDL ARTHREX MULTIFIRE/FASTPASS SCORPION AR-13995N

## (undated) DEVICE — ENDO SNARE EXACTO COLD 9MM LOOP 2.4MMX230CM 00711115

## (undated) DEVICE — SU MONOCRYL 3-0 PS-2 18" UND Y497G

## (undated) DEVICE — GLOVE PROTEXIS W/NEU-THERA 8.0  2D73TE80

## (undated) DEVICE — GLOVE PROTEXIS BLUE W/NEU-THERA 7.5  2D73EB75

## (undated) DEVICE — DRAPE ARTHROSCOPY SHOULDER BEACHCHAIR 29369

## (undated) DEVICE — SOL NACL 0.9% IRRIG 1000ML BOTTLE 07138-09

## (undated) DEVICE — PACK SHOULDER

## (undated) DEVICE — DRAIN PENROSE 0.25"X12" LATEX FREE GR101

## (undated) DEVICE — GLOVE PROTEXIS W/NEU-THERA 7.5  2D73TE75

## (undated) DEVICE — SU VICRYL 2-0 CT-1 36" UND J945H

## (undated) DEVICE — TAPE MEDIPORE 4"X10YD 2964

## (undated) DEVICE — PAD FLOOR SURGISAFE

## (undated) DEVICE — BUR SHAVER ARTHRO 6MM OVAL H9102

## (undated) DEVICE — ARTHROSCOPIC CANNULA TWIST-IN PURPLE 7MMX7CM AR-6570

## (undated) DEVICE — BLADE SHAVER ARTHRO 4.2MM FULL RADIUS 9247A

## (undated) DEVICE — DRAPE MAYO STAND 23X54 8337

## (undated) DEVICE — ESU ARTHROWAND 90DEG RF AMBIENT SUPER TURBOVAC ASHA4250-01

## (undated) DEVICE — GOWN XLG DISP 9545

## (undated) DEVICE — DRSG ADAPTIC 3X8" X3

## (undated) DEVICE — SOL NACL 0.9% IRRIG 3000ML BAG 07972-08

## (undated) DEVICE — SOL WATER IRRIG 1000ML BOTTLE 07139-09

## (undated) DEVICE — STOCKING SLEEVE COMPRESSION CALF LG

## (undated) DEVICE — SU FIBERWIRE 2 38"  AR-7200

## (undated) DEVICE — SU NDL MAYO 1824-6

## (undated) DEVICE — DRSG STERI STRIP 1X5" R1548

## (undated) DEVICE — PREP DURAPREP 26ML APL 8630

## (undated) DEVICE — TUBING PUMP LINVATEC 10K150

## (undated) DEVICE — GLOVE PROTEXIS BLUE W/NEU-THERA 8.5  2D73EB85

## (undated) RX ORDER — ONDANSETRON 2 MG/ML
INJECTION INTRAMUSCULAR; INTRAVENOUS
Status: DISPENSED
Start: 2017-03-02

## (undated) RX ORDER — GLYCOPYRROLATE 0.2 MG/ML
INJECTION, SOLUTION INTRAMUSCULAR; INTRAVENOUS
Status: DISPENSED
Start: 2017-03-02

## (undated) RX ORDER — DEXAMETHASONE SODIUM PHOSPHATE 4 MG/ML
INJECTION, SOLUTION INTRA-ARTICULAR; INTRALESIONAL; INTRAMUSCULAR; INTRAVENOUS; SOFT TISSUE
Status: DISPENSED
Start: 2017-03-02

## (undated) RX ORDER — EPHEDRINE SULFATE 50 MG/ML
INJECTION, SOLUTION INTRAVENOUS
Status: DISPENSED
Start: 2017-03-02

## (undated) RX ORDER — KETOROLAC TROMETHAMINE 30 MG/ML
INJECTION, SOLUTION INTRAMUSCULAR; INTRAVENOUS
Status: DISPENSED
Start: 2017-03-02

## (undated) RX ORDER — GLYCOPYRROLATE 0.2 MG/ML
INJECTION, SOLUTION INTRAMUSCULAR; INTRAVENOUS
Status: DISPENSED
Start: 2018-07-11

## (undated) RX ORDER — PROPOFOL 10 MG/ML
INJECTION, EMULSION INTRAVENOUS
Status: DISPENSED
Start: 2017-03-02

## (undated) RX ORDER — LIDOCAINE HYDROCHLORIDE 10 MG/ML
INJECTION, SOLUTION EPIDURAL; INFILTRATION; INTRACAUDAL; PERINEURAL
Status: DISPENSED
Start: 2017-03-02

## (undated) RX ORDER — PHENYLEPHRINE HCL IN 0.9% NACL 1 MG/10 ML
SYRINGE (ML) INTRAVENOUS
Status: DISPENSED
Start: 2017-03-02

## (undated) RX ORDER — ROPIVACAINE HYDROCHLORIDE 5 MG/ML
INJECTION, SOLUTION EPIDURAL; INFILTRATION; PERINEURAL
Status: DISPENSED
Start: 2017-03-02

## (undated) RX ORDER — FENTANYL CITRATE 50 UG/ML
INJECTION, SOLUTION INTRAMUSCULAR; INTRAVENOUS
Status: DISPENSED
Start: 2017-03-02